# Patient Record
Sex: MALE | Race: WHITE | Employment: FULL TIME | URBAN - METROPOLITAN AREA
[De-identification: names, ages, dates, MRNs, and addresses within clinical notes are randomized per-mention and may not be internally consistent; named-entity substitution may affect disease eponyms.]

---

## 2017-02-07 NOTE — TELEPHONE ENCOUNTER
Rx request for Metformin HCL 1000 mg, PASSED Diabetes Protocol. Rx filled per protocol.     Diabetes Medications  Protocol Criteria:  · Appointment scheduled in the past 6 months or the next 3 months  · A1C < 7.5 in the past 6 months  · Creatinine in the pa

## 2017-02-23 ENCOUNTER — OFFICE VISIT (OUTPATIENT)
Dept: CARDIOLOGY CLINIC | Facility: CLINIC | Age: 57
End: 2017-02-23

## 2017-02-23 VITALS
DIASTOLIC BLOOD PRESSURE: 72 MMHG | HEART RATE: 96 BPM | BODY MASS INDEX: 30 KG/M2 | WEIGHT: 222 LBS | SYSTOLIC BLOOD PRESSURE: 118 MMHG

## 2017-02-23 DIAGNOSIS — E78.00 HYPERCHOLESTEREMIA: Primary | ICD-10-CM

## 2017-02-23 DIAGNOSIS — R94.31 ABNORMAL ECG: ICD-10-CM

## 2017-02-23 PROCEDURE — 99244 OFF/OP CNSLTJ NEW/EST MOD 40: CPT | Performed by: INTERNAL MEDICINE

## 2017-02-23 PROCEDURE — 99212 OFFICE O/P EST SF 10 MIN: CPT | Performed by: INTERNAL MEDICINE

## 2017-02-23 NOTE — H&P
Chuy Age is a 61year old male. HPI:   This is a pleasant  48-year-old male with family history of heart disease, diabetes hypertension tension and elevated cholesterol who presents for assessment of abnormal EKG.   We are asked by Dr. Amadeo Gao to asse stones       Social History:    Smoking Status: Never Smoker                      Smokeless Status: Never Used                        Alcohol Use: No                 REVIEW OF SYSTEMS:   GENERAL HEALTH: feels well otherwise  SKIN: denies any unusual skin l

## 2017-03-01 RX ORDER — LISINOPRIL 40 MG/1
TABLET ORAL
Qty: 30 TABLET | Refills: 11 | Status: SHIPPED | OUTPATIENT
Start: 2017-03-01 | End: 2018-02-01

## 2017-03-30 ENCOUNTER — TELEPHONE (OUTPATIENT)
Dept: CARDIOLOGY CLINIC | Facility: CLINIC | Age: 57
End: 2017-03-30

## 2017-03-30 NOTE — TELEPHONE ENCOUNTER
Pt states that insurance told him to call Dr. Sumi Ortiz to request referral for stress echo. Appt is not scheduled yet. Please call.

## 2017-03-31 NOTE — TELEPHONE ENCOUNTER
S/w [employer] [facility] and with the customer service number and they sates pt has an HMO. S/w our Managed care department, Rochelle, and she states pt does not need a referral for the stress echo as long as he has test done within the system.       Left detailed

## 2017-04-19 ENCOUNTER — TELEPHONE (OUTPATIENT)
Dept: FAMILY MEDICINE CLINIC | Facility: CLINIC | Age: 57
End: 2017-04-19

## 2017-04-19 NOTE — TELEPHONE ENCOUNTER
Spoke to Dalton from [facility], advised her of Dr. reed, ok to take all meds. She voices understanding and agrees with plan, she will call the pt.

## 2017-04-19 NOTE — TELEPHONE ENCOUNTER
Yelena New, pt's home health nurse, called in stating pt is going to have a stress test tomorrow and he is concerned about what medications he can and cannot take for tomorrow? Please advise.

## 2017-04-19 NOTE — TELEPHONE ENCOUNTER
Patient is to have stress test tomorrow and is asking if any of patients medications should not be taken tomorrow : current med list: sent to on-call as well as [facility] out of office     METFORMIN HCL 1000 MG Oral Tab  TAKE ONE TABLET BY MOUTH TWICE DAILY WITH

## 2017-04-19 NOTE — TELEPHONE ENCOUNTER
Dr. RIVERA/Dr. Abdelrahman Michaud, please advise on behalf of [facility], out of office.

## 2017-04-20 ENCOUNTER — HOSPITAL ENCOUNTER (OUTPATIENT)
Dept: CV DIAGNOSTICS | Facility: HOSPITAL | Age: 57
Discharge: HOME | End: 2017-04-20
Attending: INTERNAL MEDICINE
Payer: COMMERCIAL

## 2017-04-20 DIAGNOSIS — R94.31 ABNORMAL ECG: ICD-10-CM

## 2017-04-20 PROCEDURE — 93016 CV STRESS TEST SUPVJ ONLY: CPT | Performed by: INTERNAL MEDICINE

## 2017-04-20 PROCEDURE — 93350 STRESS TTE ONLY: CPT

## 2017-04-20 PROCEDURE — 93018 CV STRESS TEST I&R ONLY: CPT | Performed by: INTERNAL MEDICINE

## 2017-04-20 PROCEDURE — 93017 CV STRESS TEST TRACING ONLY: CPT

## 2017-04-20 PROCEDURE — 93350 STRESS TTE ONLY: CPT | Performed by: INTERNAL MEDICINE

## 2017-04-26 ENCOUNTER — TELEPHONE (OUTPATIENT)
Dept: CARDIOLOGY CLINIC | Facility: CLINIC | Age: 57
End: 2017-04-26

## 2017-04-26 DIAGNOSIS — R94.31 ABNORMAL EKG: Primary | ICD-10-CM

## 2017-04-26 NOTE — TELEPHONE ENCOUNTER
LMTCB    Notes Recorded by Kannan Dewitt RN on 4/25/2017 at 5:00 PM  LMTCB  ------    Notes Recorded by SOHAN Jackson on 4/25/2017 at 3:56 PM  Echo pictures were not adequate to assess LV so Dr. Dianna Carr recommends thallium nuclear stress te

## 2017-05-23 ENCOUNTER — OFFICE VISIT (OUTPATIENT)
Dept: FAMILY MEDICINE CLINIC | Facility: CLINIC | Age: 57
End: 2017-05-23

## 2017-05-23 ENCOUNTER — HOSPITAL ENCOUNTER (OUTPATIENT)
Dept: GENERAL RADIOLOGY | Age: 57
Discharge: HOME | End: 2017-05-23
Attending: FAMILY MEDICINE
Payer: COMMERCIAL

## 2017-05-23 VITALS
RESPIRATION RATE: 16 BRPM | TEMPERATURE: 98 F | SYSTOLIC BLOOD PRESSURE: 138 MMHG | DIASTOLIC BLOOD PRESSURE: 94 MMHG | WEIGHT: 221.63 LBS | BODY MASS INDEX: 30.02 KG/M2 | HEART RATE: 105 BPM | HEIGHT: 72 IN

## 2017-05-23 DIAGNOSIS — R05.9 COUGH: Primary | ICD-10-CM

## 2017-05-23 DIAGNOSIS — J40 BRONCHITIS: ICD-10-CM

## 2017-05-23 DIAGNOSIS — R09.89 RHONCHI: ICD-10-CM

## 2017-05-23 DIAGNOSIS — R05.9 COUGH: ICD-10-CM

## 2017-05-23 PROCEDURE — 99214 OFFICE O/P EST MOD 30 MIN: CPT | Performed by: FAMILY MEDICINE

## 2017-05-23 PROCEDURE — 99212 OFFICE O/P EST SF 10 MIN: CPT | Performed by: FAMILY MEDICINE

## 2017-05-23 PROCEDURE — 71020 XR CHEST PA + LAT CHEST (CPT=71020): CPT | Performed by: FAMILY MEDICINE

## 2017-05-23 RX ORDER — PROMETHAZINE HYDROCHLORIDE AND CODEINE PHOSPHATE 6.25; 1 MG/5ML; MG/5ML
5 SYRUP ORAL EVERY 6 HOURS PRN
Qty: 180 ML | Refills: 0 | Status: SHIPPED | OUTPATIENT
Start: 2017-05-23 | End: 2017-07-08 | Stop reason: ALTCHOICE

## 2017-05-23 RX ORDER — ALBUTEROL SULFATE 90 UG/1
2 AEROSOL, METERED RESPIRATORY (INHALATION) EVERY 6 HOURS PRN
Qty: 1 INHALER | Refills: 0 | Status: SHIPPED | OUTPATIENT
Start: 2017-05-23 | End: 2017-07-08 | Stop reason: ALTCHOICE

## 2017-05-23 RX ORDER — PREDNISONE 20 MG/1
TABLET ORAL
Qty: 10 TABLET | Refills: 0 | Status: SHIPPED | OUTPATIENT
Start: 2017-05-23 | End: 2017-07-08 | Stop reason: ALTCHOICE

## 2017-05-23 RX ORDER — SIMVASTATIN 10 MG
TABLET ORAL
Refills: 2 | COMMUNITY
Start: 2017-05-17 | End: 2017-06-13

## 2017-05-23 NOTE — PROGRESS NOTES
Patient ID: Rubio Simmons is a 61year old male. HPI  Patient presents with:  Cough  Sore Throat    He states his cough started on May 19, 2017. The sore throat is mostly at night. He does have a runny nose.   His mother is in the hospital with bronchi LANCETS Does not apply Misc Check sugars daily Disp: 100 each Rfl: 2   FINASTERIDE 5 MG Oral Tab TAKE ONE TABLET BY MOUTH EVERY DAY Disp: 90 tablet Rfl: 3   Ascorbic Acid (VITAMIN C OR) Take by mouth.  Disp:  Rfl:    Multiple Vitamins-Minerals (MULTIVITAMIN Wheezing or Shortness of Breath. -     promethazine-codeine 6.25-10 MG/5ML Oral Syrup; Take 5 mL by mouth every 6 (six) hours as needed for cough. -     predniSONE 20 MG Oral Tab; Take 2 by mouth at same time daily for 5 days. (Best to take in A. M.)  I w

## 2017-05-24 ENCOUNTER — TELEPHONE (OUTPATIENT)
Dept: FAMILY MEDICINE CLINIC | Facility: CLINIC | Age: 57
End: 2017-05-24

## 2017-05-24 NOTE — TELEPHONE ENCOUNTER
----- Message from Ronnie Mendoza DO sent at 5/23/2017  5:48 PM CDT -----  Chest x-ray shows no pneumonia.

## 2017-05-30 ENCOUNTER — TELEPHONE (OUTPATIENT)
Dept: FAMILY MEDICINE CLINIC | Facility: CLINIC | Age: 57
End: 2017-05-30

## 2017-05-30 RX ORDER — AMOXICILLIN AND CLAVULANATE POTASSIUM 875; 125 MG/1; MG/1
1 TABLET, FILM COATED ORAL 2 TIMES DAILY
Qty: 20 TABLET | Refills: 0 | Status: SHIPPED | OUTPATIENT
Start: 2017-05-30 | End: 2017-06-09

## 2017-05-30 NOTE — TELEPHONE ENCOUNTER
Actions Requested: Declining OV; asking if Dr Inés Gage would prescribed an antibiotic or something else that might clear the cough completely  Situation/Background   Problem: Still having congested cough   Onset: 12 days   Associated Symptoms:  There has been pete very sick or weak to the triager  * Coughed up > 1 tablespoon (15 ml) blood (Exception: blood-tinged sputum)  * Fever > 103 F (39.4 C)  * Fever > 100.5 F (38.1 C) and over 61years of age  * Fever > 100.5 F (38.1 C) and has diabetes mellitus or a weak immu

## 2017-05-30 NOTE — TELEPHONE ENCOUNTER
Pt's girlfriend called in stating pt has a very bad cough still and his lungs sound congested. Pt's girlfriend states pt is also coughing up yellow phlegm. Pt's girlfriend wondering if pt can be prescribed an antibiotic? Please advise.

## 2017-06-10 NOTE — TELEPHONE ENCOUNTER
Diabetes Medications: Refilled per protocol    Protocol Criteria:  · Appointment scheduled in the past 6 months or the next 3 months  · A1C < 7.5 in the past 6 months  · Creatinine in the past 12 months  · Creatinine result < 1.5   Recent Visits       Prov

## 2017-06-13 DIAGNOSIS — E11.9 DIABETES MELLITUS WITHOUT COMPLICATION (HCC): Primary | ICD-10-CM

## 2017-06-15 RX ORDER — SIMVASTATIN 10 MG
TABLET ORAL
Qty: 30 TABLET | Refills: 11 | Status: SHIPPED | OUTPATIENT
Start: 2017-06-15 | End: 2018-05-24

## 2017-06-15 NOTE — TELEPHONE ENCOUNTER
MADAY, please contact patient to scheduled an appointment for diabetic follow up. Orders are in the system.

## 2017-06-16 ENCOUNTER — TELEPHONE (OUTPATIENT)
Dept: FAMILY MEDICINE CLINIC | Facility: CLINIC | Age: 57
End: 2017-06-16

## 2017-06-16 NOTE — TELEPHONE ENCOUNTER
Pt calling states he has to do lab work orders at [facility], pt requesting to  orders at [facility]. Please call when approved.

## 2017-06-16 NOTE — TELEPHONE ENCOUNTER
LMTCB. Orders have been printed and are ready for  at [facility]. Pt can use [facility] lab orders and take them to [facility] per [facility]. Please inform pt when he calls back.

## 2017-07-05 RX ORDER — FINASTERIDE 5 MG/1
TABLET, FILM COATED ORAL
Qty: 30 TABLET | Refills: 11 | Status: SHIPPED | OUTPATIENT
Start: 2017-07-05 | End: 2018-06-12

## 2017-07-08 ENCOUNTER — OFFICE VISIT (OUTPATIENT)
Dept: FAMILY MEDICINE CLINIC | Facility: CLINIC | Age: 57
End: 2017-07-08

## 2017-07-08 VITALS
SYSTOLIC BLOOD PRESSURE: 121 MMHG | DIASTOLIC BLOOD PRESSURE: 79 MMHG | BODY MASS INDEX: 30 KG/M2 | WEIGHT: 221 LBS | HEART RATE: 68 BPM

## 2017-07-08 DIAGNOSIS — E78.00 HIGH CHOLESTEROL: ICD-10-CM

## 2017-07-08 DIAGNOSIS — I10 ESSENTIAL HYPERTENSION: ICD-10-CM

## 2017-07-08 DIAGNOSIS — I49.3 FREQUENT PVCS: ICD-10-CM

## 2017-07-08 DIAGNOSIS — Z87.442 HISTORY OF KIDNEY STONES: ICD-10-CM

## 2017-07-08 DIAGNOSIS — E11.9 DIABETES MELLITUS WITHOUT COMPLICATION (HCC): Primary | ICD-10-CM

## 2017-07-08 DIAGNOSIS — Z12.5 SCREENING FOR PROSTATE CANCER: ICD-10-CM

## 2017-07-08 PROCEDURE — 99214 OFFICE O/P EST MOD 30 MIN: CPT | Performed by: FAMILY MEDICINE

## 2017-07-08 PROCEDURE — 99212 OFFICE O/P EST SF 10 MIN: CPT | Performed by: FAMILY MEDICINE

## 2017-07-08 NOTE — PROGRESS NOTES
Dictated patient presents for follow-up on his diabetes. His diabetes is under good control. We reviewed his blood test.  Patient states that his breathing is much improved his bronchitis has resolved.   Patient is not using any medication for that issue PVCs  stable    4. High cholesterol  Recheck nov  currenlty under control    5. History of kidney stones  Stable. 6. Screening for prostate cancer  Stable.     - PSA SCREEN; Future    Majority of time was spent in discussion with 25 minutes spent discuss

## 2017-09-21 RX ORDER — BLOOD-GLUCOSE METER
KIT MISCELLANEOUS
Qty: 50 STRIP | Refills: 2 | Status: SHIPPED | OUTPATIENT
Start: 2017-09-21 | End: 2018-05-10

## 2017-09-21 RX ORDER — LANCETS 28 GAUGE
EACH MISCELLANEOUS
Qty: 100 EACH | Refills: 2 | Status: SHIPPED | OUTPATIENT
Start: 2017-09-21 | End: 2018-08-23

## 2017-09-21 NOTE — TELEPHONE ENCOUNTER
Refill Protocol Appointment Criteria: Refilled per protocol    · Appointment scheduled in the past 12 months or in the next 3 months  Recent Outpatient Visits            2 months ago Diabetes mellitus without complication [facility])    [facility], [place]

## 2017-10-06 NOTE — TELEPHONE ENCOUNTER
Signed Prescriptions Disp Refills    METFORMIN HCL 1000 MG Oral Tab 180 tablet 0      Sig: TAKE ONE TABLET BY MOUTH TWICE DAILY WITH MEALS        Authorizing Provider: Janki Moran        Ordering User: Anthony Perez         Chart was revie

## 2018-01-17 ENCOUNTER — APPOINTMENT (OUTPATIENT)
Dept: LAB | Age: 58
End: 2018-01-17
Attending: FAMILY MEDICINE
Payer: COMMERCIAL

## 2018-01-17 DIAGNOSIS — E11.9 DIABETES MELLITUS WITHOUT COMPLICATION (HCC): ICD-10-CM

## 2018-01-17 DIAGNOSIS — Z12.5 SCREENING FOR PROSTATE CANCER: ICD-10-CM

## 2018-01-17 LAB
ALBUMIN SERPL BCP-MCNC: 3.8 G/DL (ref 3.5–4.8)
ALBUMIN/GLOB SERPL: 1.4 {RATIO} (ref 1–2)
ALP SERPL-CCNC: 48 U/L (ref 32–100)
ALT SERPL-CCNC: 34 U/L (ref 17–63)
ANION GAP SERPL CALC-SCNC: 8 MMOL/L (ref 0–18)
AST SERPL-CCNC: 25 U/L (ref 15–41)
BILIRUB SERPL-MCNC: 0.4 MG/DL (ref 0.3–1.2)
BUN SERPL-MCNC: 13 MG/DL (ref 8–20)
BUN/CREAT SERPL: 13.7 (ref 10–20)
CALCIUM SERPL-MCNC: 9.5 MG/DL (ref 8.5–10.5)
CHLORIDE SERPL-SCNC: 106 MMOL/L (ref 95–110)
CHOLEST SERPL-MCNC: 147 MG/DL (ref 110–200)
CO2 SERPL-SCNC: 24 MMOL/L (ref 22–32)
CREAT SERPL-MCNC: 0.95 MG/DL (ref 0.5–1.5)
CREAT UR-MCNC: 163.3 MG/DL
GLOBULIN PLAS-MCNC: 2.7 G/DL (ref 2.5–3.7)
GLUCOSE SERPL-MCNC: 119 MG/DL (ref 70–99)
HBA1C MFR BLD: 6.6 % (ref 4–6)
HDLC SERPL-MCNC: 49 MG/DL
LDLC SERPL CALC-MCNC: 66 MG/DL (ref 0–99)
MICROALBUMIN UR-MCNC: 1.4 MG/DL (ref 0–1.8)
MICROALBUMIN/CREAT UR: 8.6 MG/G{CREAT} (ref 0–20)
NONHDLC SERPL-MCNC: 98 MG/DL
OSMOLALITY UR CALC.SUM OF ELEC: 287 MOSM/KG (ref 275–295)
POTASSIUM SERPL-SCNC: 4.5 MMOL/L (ref 3.3–5.1)
PROT SERPL-MCNC: 6.5 G/DL (ref 5.9–8.4)
PSA SERPL-MCNC: 0.8 NG/ML (ref 0–4)
SODIUM SERPL-SCNC: 138 MMOL/L (ref 136–144)
TRIGL SERPL-MCNC: 162 MG/DL (ref 1–149)

## 2018-01-17 PROCEDURE — 83036 HEMOGLOBIN GLYCOSYLATED A1C: CPT

## 2018-01-17 PROCEDURE — 80053 COMPREHEN METABOLIC PANEL: CPT

## 2018-01-17 PROCEDURE — 80061 LIPID PANEL: CPT

## 2018-01-17 PROCEDURE — 82043 UR ALBUMIN QUANTITATIVE: CPT

## 2018-01-17 PROCEDURE — 36415 COLL VENOUS BLD VENIPUNCTURE: CPT

## 2018-01-17 PROCEDURE — 82570 ASSAY OF URINE CREATININE: CPT

## 2018-01-17 PROCEDURE — 84153 ASSAY OF PSA TOTAL: CPT

## 2018-02-01 ENCOUNTER — OFFICE VISIT (OUTPATIENT)
Dept: FAMILY MEDICINE CLINIC | Facility: CLINIC | Age: 58
End: 2018-02-01

## 2018-02-01 VITALS
DIASTOLIC BLOOD PRESSURE: 85 MMHG | SYSTOLIC BLOOD PRESSURE: 128 MMHG | WEIGHT: 218 LBS | HEART RATE: 74 BPM | BODY MASS INDEX: 30 KG/M2 | TEMPERATURE: 98 F

## 2018-02-01 DIAGNOSIS — E78.00 HIGH CHOLESTEROL: ICD-10-CM

## 2018-02-01 DIAGNOSIS — I10 ESSENTIAL HYPERTENSION: ICD-10-CM

## 2018-02-01 DIAGNOSIS — Z87.442 HISTORY OF KIDNEY STONES: ICD-10-CM

## 2018-02-01 DIAGNOSIS — Z23 NEED FOR VACCINATION: ICD-10-CM

## 2018-02-01 DIAGNOSIS — Z00.00 ANNUAL PHYSICAL EXAM: Primary | ICD-10-CM

## 2018-02-01 DIAGNOSIS — E11.9 DIABETES MELLITUS WITHOUT COMPLICATION (HCC): ICD-10-CM

## 2018-02-01 PROCEDURE — 90472 IMMUNIZATION ADMIN EACH ADD: CPT | Performed by: FAMILY MEDICINE

## 2018-02-01 PROCEDURE — 90715 TDAP VACCINE 7 YRS/> IM: CPT | Performed by: FAMILY MEDICINE

## 2018-02-01 PROCEDURE — 90471 IMMUNIZATION ADMIN: CPT | Performed by: FAMILY MEDICINE

## 2018-02-01 PROCEDURE — 90670 PCV13 VACCINE IM: CPT | Performed by: FAMILY MEDICINE

## 2018-02-01 PROCEDURE — 99396 PREV VISIT EST AGE 40-64: CPT | Performed by: FAMILY MEDICINE

## 2018-02-01 RX ORDER — LISINOPRIL 40 MG/1
40 TABLET ORAL
Qty: 90 TABLET | Refills: 3 | Status: SHIPPED | OUTPATIENT
Start: 2018-02-01 | End: 2019-02-09

## 2018-02-01 NOTE — PROGRESS NOTES
REASON FOR VISIT:    Vidya Duran is a 61year old male who presents for an [address]. \"my sugars have been good. \"    Patient Active Problem List:     Hypercholesteremia     Abnormal ECG      Current Outpatient Prescriptions:  lisinopri AST 25 01/17/2018   AST 20 06/29/2017   AST 18 11/10/2016       Lab Results  Component Value Date   ALT 34 01/17/2018   ALT 32 06/29/2017   ALT 24 11/10/2016     No results found for: TSH, T4F    Lab Results  Component Value Date   BUN 13 01/17/2018   BU 01/17/2018 119 (H)     GLUCOSE (mg/dL)   Date Value   06/29/2017 132 (H)         Gonorrhea Screening if high risk No results found for: GONOCOCCUS    HIV Screening For all adults age 22-65, older adults at increased risk No results found for: HIV    Syph Current Outpatient Prescriptions:  lisinopril 40 MG Oral Tab Take 1 tablet (40 mg total) by mouth once daily.  Disp: 90 tablet Rfl: 3   METFORMIN HCL 1000 MG Oral Tab TAKE ONE TABLET BY MOUTH TWICE DAILY WITH MEALS Disp: 180 tablet Rfl: 0   FREESTYLE LA nasal congestion, sinus pain or ST  LUNGS: denies shortness of breath with exertion  CARDIOVASCULAR: denies chest pain on exertion  GI: denies abdominal pain, denies heartburn  : denies dysuria, vaginal discharge or itching, periods regular   MUSCULOSKEL patient indicates understanding of these issues and agrees to the plan. The patient is asked to return in 6 mo for diabetes.   Diet counseling perfomed    SUGGESTED VACCINATIONS - Influenza, Pneumococcal, Zoster, Tetanus     Immunization History   Administ

## 2018-02-03 NOTE — TELEPHONE ENCOUNTER
Chart reviewed. Refills sent per Triage Dept protocol. Diabetes Medications, RTC in 6 months from [address] [place] [address].   Protocol Criteria:  · Appointment scheduled in the past 6 months or the next 3 months  · A1C < 7.5 in the past 6 months  · Creatinine in the past 12 m

## 2018-05-14 RX ORDER — BLOOD-GLUCOSE METER
KIT MISCELLANEOUS
Qty: 100 STRIP | Refills: 2 | Status: SHIPPED | OUTPATIENT
Start: 2018-05-14 | End: 2018-08-23

## 2018-05-24 RX ORDER — SIMVASTATIN 10 MG
TABLET ORAL
Qty: 90 TABLET | Refills: 0 | Status: SHIPPED | OUTPATIENT
Start: 2018-05-24 | End: 2018-08-23

## 2018-06-12 RX ORDER — FINASTERIDE 5 MG/1
TABLET, FILM COATED ORAL
Qty: 30 TABLET | Refills: 10 | Status: SHIPPED | OUTPATIENT
Start: 2018-06-12 | End: 2019-05-16

## 2018-08-14 ENCOUNTER — APPOINTMENT (OUTPATIENT)
Dept: LAB | Age: 58
End: 2018-08-14
Attending: FAMILY MEDICINE
Payer: COMMERCIAL

## 2018-08-14 DIAGNOSIS — E11.9 DIABETES MELLITUS WITHOUT COMPLICATION (HCC): ICD-10-CM

## 2018-08-14 LAB
ALBUMIN SERPL BCP-MCNC: 4 G/DL (ref 3.5–4.8)
ALBUMIN/GLOB SERPL: 1.7 {RATIO} (ref 1–2)
ALP SERPL-CCNC: 48 U/L (ref 32–100)
ALT SERPL-CCNC: 34 U/L (ref 17–63)
ANION GAP SERPL CALC-SCNC: 8 MMOL/L (ref 0–18)
AST SERPL-CCNC: 28 U/L (ref 15–41)
BILIRUB SERPL-MCNC: 0.9 MG/DL (ref 0.3–1.2)
BUN SERPL-MCNC: 17 MG/DL (ref 8–20)
BUN/CREAT SERPL: 17 (ref 10–20)
CALCIUM SERPL-MCNC: 9.5 MG/DL (ref 8.5–10.5)
CHLORIDE SERPL-SCNC: 105 MMOL/L (ref 95–110)
CHOLEST SERPL-MCNC: 151 MG/DL (ref 110–200)
CO2 SERPL-SCNC: 25 MMOL/L (ref 22–32)
CREAT SERPL-MCNC: 1 MG/DL (ref 0.5–1.5)
CREAT UR-MCNC: 228.2 MG/DL
GLOBULIN PLAS-MCNC: 2.3 G/DL (ref 2.5–3.7)
GLUCOSE SERPL-MCNC: 124 MG/DL (ref 70–99)
HDLC SERPL-MCNC: 53 MG/DL
LDLC SERPL CALC-MCNC: 82 MG/DL (ref 0–99)
MICROALBUMIN UR-MCNC: 0.8 MG/DL (ref 0–1.8)
MICROALBUMIN/CREAT UR: 3.5 MG/G{CREAT} (ref 0–20)
NONHDLC SERPL-MCNC: 98 MG/DL
OSMOLALITY UR CALC.SUM OF ELEC: 289 MOSM/KG (ref 275–295)
PATIENT FASTING: YES
POTASSIUM SERPL-SCNC: 4.7 MMOL/L (ref 3.3–5.1)
PROT SERPL-MCNC: 6.3 G/DL (ref 5.9–8.4)
SODIUM SERPL-SCNC: 138 MMOL/L (ref 136–144)
TRIGL SERPL-MCNC: 80 MG/DL (ref 1–149)

## 2018-08-14 PROCEDURE — 36415 COLL VENOUS BLD VENIPUNCTURE: CPT

## 2018-08-14 PROCEDURE — 83036 HEMOGLOBIN GLYCOSYLATED A1C: CPT

## 2018-08-14 PROCEDURE — 80061 LIPID PANEL: CPT

## 2018-08-14 PROCEDURE — 82043 UR ALBUMIN QUANTITATIVE: CPT

## 2018-08-14 PROCEDURE — 80053 COMPREHEN METABOLIC PANEL: CPT

## 2018-08-14 PROCEDURE — 82570 ASSAY OF URINE CREATININE: CPT

## 2018-08-15 LAB — HBA1C MFR BLD: 6.5 % (ref 4–6)

## 2018-08-22 ENCOUNTER — TELEPHONE (OUTPATIENT)
Dept: CASE MANAGEMENT | Age: 58
End: 2018-08-22

## 2018-08-22 DIAGNOSIS — Z12.11 COLON CANCER SCREENING: Primary | ICD-10-CM

## 2018-08-23 ENCOUNTER — OFFICE VISIT (OUTPATIENT)
Dept: FAMILY MEDICINE CLINIC | Facility: CLINIC | Age: 58
End: 2018-08-23

## 2018-08-23 VITALS
HEART RATE: 71 BPM | TEMPERATURE: 98 F | DIASTOLIC BLOOD PRESSURE: 83 MMHG | RESPIRATION RATE: 18 BRPM | SYSTOLIC BLOOD PRESSURE: 130 MMHG | HEIGHT: 72 IN | BODY MASS INDEX: 29.12 KG/M2 | WEIGHT: 215 LBS

## 2018-08-23 DIAGNOSIS — Z12.5 SCREENING FOR PROSTATE CANCER: ICD-10-CM

## 2018-08-23 DIAGNOSIS — E78.00 HIGH CHOLESTEROL: ICD-10-CM

## 2018-08-23 DIAGNOSIS — E11.9 TYPE 2 DIABETES MELLITUS WITHOUT COMPLICATION, WITHOUT LONG-TERM CURRENT USE OF INSULIN (HCC): Primary | ICD-10-CM

## 2018-08-23 DIAGNOSIS — K42.9 UMBILICAL HERNIA WITHOUT OBSTRUCTION AND WITHOUT GANGRENE: ICD-10-CM

## 2018-08-23 DIAGNOSIS — I10 ESSENTIAL HYPERTENSION: ICD-10-CM

## 2018-08-23 PROCEDURE — 99214 OFFICE O/P EST MOD 30 MIN: CPT | Performed by: FAMILY MEDICINE

## 2018-08-23 PROCEDURE — 99212 OFFICE O/P EST SF 10 MIN: CPT | Performed by: FAMILY MEDICINE

## 2018-08-23 RX ORDER — SIMVASTATIN 10 MG
10 TABLET ORAL
Qty: 90 TABLET | Refills: 3 | Status: SHIPPED | OUTPATIENT
Start: 2018-08-23 | End: 2019-04-22

## 2018-08-23 NOTE — PROGRESS NOTES
Diabetic Visit  Sugars usually under 120 and above 110. \"Even when I eat portillos cake. \"    Patient denies problems with their medication. Denies ulcers, chest pain, dyspnea on exertion.     Ht rrr no M no S3 S4  Lung clear no wheeze  abd soft nontende

## 2018-08-24 ENCOUNTER — TELEPHONE (OUTPATIENT)
Dept: OTHER | Age: 58
End: 2018-08-24

## 2018-08-24 NOTE — TELEPHONE ENCOUNTER
LMTCB    Blood sugar cholesterol liver kidney function were all fine.  Continue on the current medication regimen.  Repeat blood tests 6 months.  RN please put an order for diabetic panel diagnosis diabetes mellitus type 2 non-insulin-requiring no complica

## 2018-08-27 NOTE — TELEPHONE ENCOUNTER
Pt stated that he is aware of the test results. He stated that he was in on 8/23/2018 and his test results were discuss . Thanks

## 2018-08-27 NOTE — TELEPHONE ENCOUNTER
Patient returned our call and states that he had colonoscopy at [facility] in 2009. No record of it in [place] nor [facility]. Gastro office contacted and they have no record of it.

## 2018-08-28 NOTE — TELEPHONE ENCOUNTER
Patient returned our call and states that he is certain that he had the colonoscopy in 2009 @ [place].   No record found. Patient states that he will schedule on for 2019.

## 2018-09-11 RX ORDER — BLOOD-GLUCOSE METER
KIT MISCELLANEOUS
Qty: 1 KIT | Refills: 0 | Status: SHIPPED | OUTPATIENT
Start: 2018-09-11 | End: 2022-12-06

## 2018-09-11 RX ORDER — LANCETS 28 GAUGE
EACH MISCELLANEOUS
Qty: 100 EACH | Refills: 3 | Status: SHIPPED | OUTPATIENT
Start: 2018-09-11 | End: 2019-12-14

## 2018-09-11 RX ORDER — BLOOD-GLUCOSE CONTROL, NORMAL
EACH MISCELLANEOUS
Qty: 3 EACH | Refills: 3 | Status: SHIPPED | OUTPATIENT
Start: 2018-09-11 | End: 2022-12-06

## 2018-09-11 RX ORDER — BLOOD-GLUCOSE METER
KIT MISCELLANEOUS
Qty: 100 STRIP | Refills: 3 | Status: SHIPPED | OUTPATIENT
Start: 2018-09-11 | End: 2019-10-16

## 2018-09-24 ENCOUNTER — TELEPHONE (OUTPATIENT)
Dept: CASE MANAGEMENT | Age: 58
End: 2018-09-24

## 2018-10-19 ENCOUNTER — TELEPHONE (OUTPATIENT)
Dept: CASE MANAGEMENT | Age: 58
End: 2018-10-19

## 2018-11-20 ENCOUNTER — TELEPHONE (OUTPATIENT)
Dept: CASE MANAGEMENT | Age: 58
End: 2018-11-20

## 2019-01-10 ENCOUNTER — TELEPHONE (OUTPATIENT)
Dept: FAMILY MEDICINE CLINIC | Facility: CLINIC | Age: 59
End: 2019-01-10

## 2019-01-10 ENCOUNTER — OFFICE VISIT (OUTPATIENT)
Dept: FAMILY MEDICINE CLINIC | Facility: CLINIC | Age: 59
End: 2019-01-10

## 2019-01-10 ENCOUNTER — NURSE TRIAGE (OUTPATIENT)
Dept: OTHER | Age: 59
End: 2019-01-10

## 2019-01-10 VITALS
TEMPERATURE: 97 F | HEART RATE: 73 BPM | WEIGHT: 212 LBS | BODY MASS INDEX: 29 KG/M2 | SYSTOLIC BLOOD PRESSURE: 137 MMHG | DIASTOLIC BLOOD PRESSURE: 79 MMHG

## 2019-01-10 DIAGNOSIS — J11.1 INFLUENZA: ICD-10-CM

## 2019-01-10 DIAGNOSIS — Z12.5 SCREENING FOR PROSTATE CANCER: Primary | ICD-10-CM

## 2019-01-10 DIAGNOSIS — E11.9 TYPE 2 DIABETES MELLITUS WITHOUT COMPLICATION, WITHOUT LONG-TERM CURRENT USE OF INSULIN (HCC): Primary | ICD-10-CM

## 2019-01-10 LAB
CONTROL LINE PRESENT WITH A CLEAR BACKGROUND (YES/NO): YES YES/NO
KIT LOT #: NORMAL NUMERIC

## 2019-01-10 PROCEDURE — 99213 OFFICE O/P EST LOW 20 MIN: CPT | Performed by: FAMILY MEDICINE

## 2019-01-10 PROCEDURE — 99212 OFFICE O/P EST SF 10 MIN: CPT | Performed by: FAMILY MEDICINE

## 2019-01-10 PROCEDURE — 87880 STREP A ASSAY W/OPTIC: CPT | Performed by: FAMILY MEDICINE

## 2019-01-10 RX ORDER — PROMETHAZINE HYDROCHLORIDE AND CODEINE PHOSPHATE 6.25; 1 MG/5ML; MG/5ML
2.5 SYRUP ORAL EVERY 6 HOURS PRN
Qty: 180 ML | Refills: 0 | Status: SHIPPED | OUTPATIENT
Start: 2019-01-10 | End: 2019-10-10

## 2019-01-10 RX ORDER — ALBUTEROL SULFATE 90 UG/1
2 AEROSOL, METERED RESPIRATORY (INHALATION) EVERY 4 HOURS PRN
Qty: 1 INHALER | Refills: 3 | Status: SHIPPED | OUTPATIENT
Start: 2019-01-10 | End: 2019-10-17 | Stop reason: ALTCHOICE

## 2019-01-10 NOTE — TELEPHONE ENCOUNTER
appt made for sore throat, cough,cold,fever x 5 days-appt made with Dr Julianna Teixeira-    Reason for Disposition  • Patient wants to be seen    Protocols used: Uriah Rocha

## 2019-01-10 NOTE — TELEPHONE ENCOUNTER
PSA test was ordered.   He is to be 24 hours without symptoms prior to going back to the nursing home

## 2019-01-10 NOTE — PROGRESS NOTES
Pt complains of cough congestion   \"I have a really bad sore throat. \"  Had it for 4 days  No fever   No sob   No chest pain   No neck stiffness  No Headaches    Well-hydrated no shortness of breath no apparent distress but congested   Normocephalic atrau

## 2019-01-10 NOTE — TELEPHONE ENCOUNTER
Patient calling in, had OV today 1/10/19 with Dr [facility], wanted to ask how long would his symptoms be contagious, visits mother in a nursing home.  Please Advise    Patient also reported wanted to have a PSA done with his labs ordered today, current order in s

## 2019-02-09 RX ORDER — LISINOPRIL 40 MG/1
TABLET ORAL
Qty: 90 TABLET | Refills: 0 | Status: SHIPPED | OUTPATIENT
Start: 2019-02-09 | End: 2019-05-14

## 2019-02-27 ENCOUNTER — APPOINTMENT (OUTPATIENT)
Dept: LAB | Age: 59
End: 2019-02-27
Attending: FAMILY MEDICINE
Payer: COMMERCIAL

## 2019-02-27 DIAGNOSIS — Z12.5 SCREENING FOR PROSTATE CANCER: ICD-10-CM

## 2019-02-27 DIAGNOSIS — E11.9 TYPE 2 DIABETES MELLITUS WITHOUT COMPLICATION, WITHOUT LONG-TERM CURRENT USE OF INSULIN (HCC): ICD-10-CM

## 2019-02-27 LAB
ALBUMIN SERPL-MCNC: 3.7 G/DL (ref 3.4–5)
ALBUMIN/GLOB SERPL: 1.1 {RATIO} (ref 1–2)
ALP LIVER SERPL-CCNC: 60 U/L (ref 45–117)
ALT SERPL-CCNC: 35 U/L (ref 16–61)
ANION GAP SERPL CALC-SCNC: 5 MMOL/L (ref 0–18)
AST SERPL-CCNC: 18 U/L (ref 15–37)
BILIRUB SERPL-MCNC: 0.3 MG/DL (ref 0.1–2)
BUN BLD-MCNC: 19 MG/DL (ref 7–18)
BUN/CREAT SERPL: 17.9 (ref 10–20)
CALCIUM BLD-MCNC: 9.4 MG/DL (ref 8.5–10.1)
CHLORIDE SERPL-SCNC: 110 MMOL/L (ref 98–107)
CHOLEST SMN-MCNC: 155 MG/DL (ref ?–200)
CO2 SERPL-SCNC: 27 MMOL/L (ref 21–32)
COMPLEXED PSA SERPL-MCNC: 0.96 NG/ML (ref ?–4)
CREAT BLD-MCNC: 1.06 MG/DL (ref 0.7–1.3)
CREAT UR-SCNC: 165 MG/DL
EST. AVERAGE GLUCOSE BLD GHB EST-MCNC: 140 MG/DL (ref 68–126)
GLOBULIN PLAS-MCNC: 3.3 G/DL (ref 2.8–4.4)
GLUCOSE BLD-MCNC: 129 MG/DL (ref 70–99)
HBA1C MFR BLD HPLC: 6.5 % (ref ?–5.7)
HDLC SERPL-MCNC: 54 MG/DL (ref 40–59)
LDLC SERPL CALC-MCNC: 77 MG/DL (ref ?–100)
M PROTEIN MFR SERPL ELPH: 7 G/DL (ref 6.4–8.2)
MICROALBUMIN UR-MCNC: 0.97 MG/DL
MICROALBUMIN/CREAT 24H UR-RTO: 5.9 UG/MG (ref ?–30)
NONHDLC SERPL-MCNC: 101 MG/DL (ref ?–130)
OSMOLALITY SERPL CALC.SUM OF ELEC: 298 MOSM/KG (ref 275–295)
POTASSIUM SERPL-SCNC: 4.7 MMOL/L (ref 3.5–5.1)
SODIUM SERPL-SCNC: 142 MMOL/L (ref 136–145)
TRIGL SERPL-MCNC: 120 MG/DL (ref 30–149)
VLDLC SERPL CALC-MCNC: 24 MG/DL (ref 0–30)

## 2019-02-27 PROCEDURE — 36415 COLL VENOUS BLD VENIPUNCTURE: CPT

## 2019-02-27 PROCEDURE — 82570 ASSAY OF URINE CREATININE: CPT

## 2019-02-27 PROCEDURE — 80061 LIPID PANEL: CPT

## 2019-02-27 PROCEDURE — 82043 UR ALBUMIN QUANTITATIVE: CPT

## 2019-02-27 PROCEDURE — 83036 HEMOGLOBIN GLYCOSYLATED A1C: CPT

## 2019-02-27 PROCEDURE — 80053 COMPREHEN METABOLIC PANEL: CPT

## 2019-03-30 ENCOUNTER — OFFICE VISIT (OUTPATIENT)
Dept: FAMILY MEDICINE CLINIC | Facility: CLINIC | Age: 59
End: 2019-03-30

## 2019-03-30 VITALS
SYSTOLIC BLOOD PRESSURE: 128 MMHG | WEIGHT: 212 LBS | TEMPERATURE: 98 F | HEIGHT: 72 IN | DIASTOLIC BLOOD PRESSURE: 82 MMHG | HEART RATE: 78 BPM | RESPIRATION RATE: 16 BRPM | BODY MASS INDEX: 28.71 KG/M2

## 2019-03-30 DIAGNOSIS — I25.10 ATHEROSCLEROSIS OF NATIVE CORONARY ARTERY OF NATIVE HEART WITHOUT ANGINA PECTORIS: ICD-10-CM

## 2019-03-30 DIAGNOSIS — E78.00 HYPERCHOLESTEREMIA: ICD-10-CM

## 2019-03-30 DIAGNOSIS — K42.9 UMBILICAL HERNIA WITHOUT OBSTRUCTION AND WITHOUT GANGRENE: ICD-10-CM

## 2019-03-30 DIAGNOSIS — I10 ESSENTIAL HYPERTENSION: ICD-10-CM

## 2019-03-30 DIAGNOSIS — Z87.442 HISTORY OF KIDNEY STONES: ICD-10-CM

## 2019-03-30 DIAGNOSIS — E66.3 PATIENT OVERWEIGHT: ICD-10-CM

## 2019-03-30 DIAGNOSIS — E11.59 TYPE 2 DIABETES MELLITUS WITH OTHER CIRCULATORY COMPLICATION, WITHOUT LONG-TERM CURRENT USE OF INSULIN (HCC): ICD-10-CM

## 2019-03-30 DIAGNOSIS — Z00.00 ANNUAL PHYSICAL EXAM: Primary | ICD-10-CM

## 2019-03-30 DIAGNOSIS — Z12.11 COLON CANCER SCREENING: ICD-10-CM

## 2019-03-30 PROCEDURE — 99396 PREV VISIT EST AGE 40-64: CPT | Performed by: FAMILY MEDICINE

## 2019-03-30 PROCEDURE — 90471 IMMUNIZATION ADMIN: CPT | Performed by: FAMILY MEDICINE

## 2019-03-30 PROCEDURE — 90732 PPSV23 VACC 2 YRS+ SUBQ/IM: CPT | Performed by: FAMILY MEDICINE

## 2019-03-30 NOTE — PROGRESS NOTES
REASON FOR VISIT:    Angela Armstrong is a 60year old male who presents for an [address].         Patient Active Problem List:     Hypercholesteremia     Abnormal ECG    General Health           CAGE:           Depression Screening (PHQ-2/PHQ-9) 02/27/2019 4.7     POTASSIUM (mmol/L)   Date Value   06/29/2017 4.8       Creatinine  Annually CREATININE (mg/dL)   Date Value   06/29/2017 1.06     Creatinine (mg/dL)   Date Value   02/27/2019 1.06       Digoxin Serum Conc  Annually No results found for mouth. Disp:  Rfl:    Multiple Vitamins-Minerals (MULTIVITAMIN MEN OR) Take by mouth. Disp:  Rfl:    aspirin 81 MG Oral Tab Take 1 tablet by mouth daily. Disp: 100 tablet Rfl: 3   Coenzyme Q10 (CO Q 10 OR) Take 1 tablet by mouth daily.  Disp:  Rfl:    prome Ht 6' (1.829 m)   Wt 212 lb (96.2 kg)   BMI 28.75 kg/m²   > BP Readings from Last 3 Encounters:  03/30/19 : 128/82  01/10/19 : 137/79  08/23/18 : 130/83      GENERAL: well developed, well nourished, in no apparent distress  SKIN: no rashes, no suspicious Colon cancer screening  Order /referral given. - EVALUATE & TREAT, GASTRO (INTERNAL)  2   The patient indicates understanding of these issues and agrees to the plan.   The patient is asked to return in 6 months for medication check  Diet counseling perfome

## 2019-04-18 ENCOUNTER — HOSPITAL ENCOUNTER (OUTPATIENT)
Dept: CT IMAGING | Age: 59
Discharge: HOME | End: 2019-04-18
Attending: FAMILY MEDICINE

## 2019-04-18 DIAGNOSIS — E11.59 TYPE 2 DIABETES MELLITUS WITH OTHER CIRCULATORY COMPLICATION, WITHOUT LONG-TERM CURRENT USE OF INSULIN (HCC): ICD-10-CM

## 2019-04-18 DIAGNOSIS — Z00.00 ANNUAL PHYSICAL EXAM: ICD-10-CM

## 2019-04-18 DIAGNOSIS — I25.10 ATHEROSCLEROSIS OF NATIVE CORONARY ARTERY OF NATIVE HEART WITHOUT ANGINA PECTORIS: ICD-10-CM

## 2019-04-18 NOTE — PROGRESS NOTES
Pt seen at [facility], [facility]:  PRELIMINARY LHZUW=286    TS=245/80 (still needs to take AM BP meds)    Cholestec labs as follows:  AA=818  HDL=53  LDL=74  GF=274  GLUCOSE=141; fasting    All results and risk factors discussed with patient; all arlet

## 2019-04-22 ENCOUNTER — TELEPHONE (OUTPATIENT)
Dept: OTHER | Age: 59
End: 2019-04-22

## 2019-04-22 DIAGNOSIS — R93.1 ABNORMAL HEART SCORE CT: Primary | ICD-10-CM

## 2019-04-22 RX ORDER — ROSUVASTATIN CALCIUM 40 MG/1
40 TABLET, COATED ORAL NIGHTLY
Qty: 90 TABLET | Refills: 3 | Status: SHIPPED | OUTPATIENT
Start: 2019-04-22 | End: 2020-04-07

## 2019-04-22 NOTE — TELEPHONE ENCOUNTER
LMTCB please transfer to triage.  Thanks    Notes recorded by Juan J Okeefe DO on 4/19/2019 at 1:53 PM CDT  The CT calcium score demonstrated moderately significant cholesterol around the heart.  I would have the patient follow-up with dr Shawn Hubbard jacob

## 2019-04-23 NOTE — TELEPHONE ENCOUNTER
Advised patient of Dr. María Tillman note. Patient verbalized understanding and will call cardiology.

## 2019-04-23 NOTE — TELEPHONE ENCOUNTER
Please clarify , you have sent this to [facility] . Dr. Seven Logan did see pt in 2017. But Dr. Mejia Maurer is the MD named below for pt to see.

## 2019-04-23 NOTE — TELEPHONE ENCOUNTER
LMTCB please transfer to triage. Thanks  Pt emergency contact was also called. No answer  I will send a no response letter to address on file. Please inform pt of Dr. [facility] message below.

## 2019-04-26 NOTE — TELEPHONE ENCOUNTER
LMTCB noted message below was only send to me. Just wanted to f/u with pt. Has he made a appt with the cardiologist?and when is it.

## 2019-04-29 NOTE — TELEPHONE ENCOUNTER
Dr Lizette Claire, patient saw Dr Sumi Ortiz 2/23/17, had abn EKG, did echo stress test that was inconclusive, Rachelle ordered thalium stress test which has not been done. I don't see a report from any other location scanned into the chart.      Do you want patien

## 2019-04-30 NOTE — TELEPHONE ENCOUNTER
Dr. Larissa Ballesteros cardiology   [address] [facility]   [phone]

## 2019-05-01 NOTE — TELEPHONE ENCOUNTER
Pt called and states he is working and not able to return the previous calls. He did not receive the letter. Informed him of name, address and phone #  of  Interventional cardiologist : Dr Makayla Johnson . Pt will call and set appt.

## 2019-05-14 RX ORDER — LISINOPRIL 40 MG/1
TABLET ORAL
Qty: 90 TABLET | Refills: 0 | Status: SHIPPED | OUTPATIENT
Start: 2019-05-14 | End: 2019-08-12

## 2019-05-15 RX ORDER — LISINOPRIL 40 MG/1
TABLET ORAL
Qty: 90 TABLET | Refills: 0 | OUTPATIENT
Start: 2019-05-15

## 2019-05-16 RX ORDER — FINASTERIDE 5 MG/1
TABLET, FILM COATED ORAL
Qty: 30 TABLET | Refills: 9 | Status: SHIPPED | OUTPATIENT
Start: 2019-05-16 | End: 2020-03-26

## 2019-06-03 NOTE — TELEPHONE ENCOUNTER
Refill passed per [facility], [facility] protocol.   Diabetes Medications  Protocol Criteria:  · Appointment scheduled in the past 6 months or the next 3 months  · A1C < 7.5 in the past 6 months  · Creatinine in the past 12 months  · Creatinine result < 1.5   Rece

## 2019-08-08 ENCOUNTER — LAB ENCOUNTER (OUTPATIENT)
Dept: LAB | Age: 59
End: 2019-08-08
Attending: INTERNAL MEDICINE
Payer: COMMERCIAL

## 2019-08-08 DIAGNOSIS — I25.10 CORONARY ATHEROSCLEROSIS OF NATIVE CORONARY ARTERY: Primary | ICD-10-CM

## 2019-08-08 LAB
CHOLEST SMN-MCNC: 109 MG/DL (ref ?–200)
HDLC SERPL-MCNC: 62 MG/DL (ref 40–59)
LDLC SERPL CALC-MCNC: 31 MG/DL (ref ?–100)
NONHDLC SERPL-MCNC: 47 MG/DL (ref ?–130)
PATIENT FASTING: YES
TRIGL SERPL-MCNC: 78 MG/DL (ref 30–149)
VLDLC SERPL CALC-MCNC: 16 MG/DL (ref 0–30)

## 2019-08-08 PROCEDURE — 36415 COLL VENOUS BLD VENIPUNCTURE: CPT

## 2019-08-08 PROCEDURE — 80061 LIPID PANEL: CPT

## 2019-08-13 RX ORDER — LISINOPRIL 40 MG/1
TABLET ORAL
Qty: 90 TABLET | Refills: 1 | Status: SHIPPED | OUTPATIENT
Start: 2019-08-13 | End: 2020-02-06

## 2019-08-13 NOTE — TELEPHONE ENCOUNTER
Refill passed per [facility], [facility] protocol.     Hypertensive Medications  Protocol Criteria:  · Appointment scheduled in the past 6 months or in the next 3 months  · BMP or CMP in the past 12 months  · Creatinine result < 2  Recent Outpatient Visits

## 2019-09-03 ENCOUNTER — TELEPHONE (OUTPATIENT)
Dept: CASE MANAGEMENT | Age: 59
End: 2019-09-03

## 2019-09-03 NOTE — TELEPHONE ENCOUNTER
Patient is due to schedule GI consult for colonoscopy, referral written 3/30/19. Left message to call back [phone].

## 2019-09-09 NOTE — TELEPHONE ENCOUNTER
Returned patients call, informed is due to schedule GI consult for colonoscopy, transferred to GI scheduling.

## 2019-10-03 ENCOUNTER — APPOINTMENT (OUTPATIENT)
Dept: LAB | Age: 59
End: 2019-10-03
Attending: FAMILY MEDICINE
Payer: COMMERCIAL

## 2019-10-03 DIAGNOSIS — E11.9 TYPE 2 DIABETES MELLITUS WITHOUT COMPLICATION, WITHOUT LONG-TERM CURRENT USE OF INSULIN (HCC): ICD-10-CM

## 2019-10-03 DIAGNOSIS — E11.59 TYPE 2 DIABETES MELLITUS WITH OTHER CIRCULATORY COMPLICATION, WITHOUT LONG-TERM CURRENT USE OF INSULIN (HCC): ICD-10-CM

## 2019-10-03 PROCEDURE — 36415 COLL VENOUS BLD VENIPUNCTURE: CPT

## 2019-10-03 PROCEDURE — 82043 UR ALBUMIN QUANTITATIVE: CPT

## 2019-10-03 PROCEDURE — 83036 HEMOGLOBIN GLYCOSYLATED A1C: CPT

## 2019-10-03 PROCEDURE — 80061 LIPID PANEL: CPT

## 2019-10-03 PROCEDURE — 80053 COMPREHEN METABOLIC PANEL: CPT

## 2019-10-03 PROCEDURE — 82570 ASSAY OF URINE CREATININE: CPT

## 2019-10-08 ENCOUNTER — TELEPHONE (OUTPATIENT)
Dept: FAMILY MEDICINE CLINIC | Facility: CLINIC | Age: 59
End: 2019-10-08

## 2019-10-08 NOTE — TELEPHONE ENCOUNTER
----- Message from Alphonso Lundberg RN sent at 99/6/8320  4:26 PM CDT -----  Clinical staff, please assist with normal results, thanks

## 2019-10-08 NOTE — TELEPHONE ENCOUNTER
Notes recorded by Andrea Castro DO on 10/4/2019 at 1:26 PM CDT  Blood sugar stable cholesterol stable liver kidney tests are fine continue on the current medication regimen

## 2019-10-09 NOTE — TELEPHONE ENCOUNTER
Patient called states does not need results of labs has appointment with Dr. Lieberman Friend 10/10/19

## 2019-10-10 ENCOUNTER — TELEPHONE (OUTPATIENT)
Dept: FAMILY MEDICINE CLINIC | Facility: CLINIC | Age: 59
End: 2019-10-10

## 2019-10-10 ENCOUNTER — OFFICE VISIT (OUTPATIENT)
Dept: FAMILY MEDICINE CLINIC | Facility: CLINIC | Age: 59
End: 2019-10-10

## 2019-10-10 VITALS
SYSTOLIC BLOOD PRESSURE: 134 MMHG | DIASTOLIC BLOOD PRESSURE: 80 MMHG | WEIGHT: 216 LBS | HEIGHT: 72 IN | HEART RATE: 73 BPM | BODY MASS INDEX: 29.26 KG/M2

## 2019-10-10 DIAGNOSIS — M19.049 ARTHRITIS OF FINGER: ICD-10-CM

## 2019-10-10 DIAGNOSIS — E78.00 HYPERCHOLESTEREMIA: ICD-10-CM

## 2019-10-10 DIAGNOSIS — K42.9 UMBILICAL HERNIA WITHOUT OBSTRUCTION AND WITHOUT GANGRENE: ICD-10-CM

## 2019-10-10 DIAGNOSIS — I10 ESSENTIAL HYPERTENSION: ICD-10-CM

## 2019-10-10 DIAGNOSIS — I25.10 ATHEROSCLEROSIS OF NATIVE CORONARY ARTERY OF NATIVE HEART WITHOUT ANGINA PECTORIS: ICD-10-CM

## 2019-10-10 DIAGNOSIS — Z12.5 PROSTATE CANCER SCREENING: ICD-10-CM

## 2019-10-10 DIAGNOSIS — Z12.11 COLON CANCER SCREENING: ICD-10-CM

## 2019-10-10 DIAGNOSIS — I65.23 CAROTID ATHEROSCLEROSIS, BILATERAL: ICD-10-CM

## 2019-10-10 DIAGNOSIS — E11.59 TYPE 2 DIABETES MELLITUS WITH OTHER CIRCULATORY COMPLICATION, WITHOUT LONG-TERM CURRENT USE OF INSULIN (HCC): Primary | ICD-10-CM

## 2019-10-10 PROCEDURE — 99215 OFFICE O/P EST HI 40 MIN: CPT | Performed by: FAMILY MEDICINE

## 2019-10-10 NOTE — H&P
[address] [facility] [address] Gastroenterology                                                                                                  Clinic History and Physical     Pa • COLONOSCOPY     • HERNIA SURGERY     • Sharri Rodriguez      as infant for empyema      Family Hx:   Family History   Problem Relation Age of Onset   • Heart Disease Father 71        had [address] and [address] way bypass.    • Cancer Father         Prostat Rfl:         Allergies:    Ciprofloxacin               ROS:   CONSTITUTIONAL:  negative for fevers, rigors  EYES:  negative for diplopia   RESPIRATORY:  negative for severe shortness of breath  CARDIOVASCULAR:  negative for crushing sub-sternal chest pain upper and lower GI perspective without red flag/alarm symptoms. We discussed the colonoscopy procedure and all questions/concerns regarding this were addressed. The patient is amenable to proceeding.       2. Elevated BP reading: If BP remains > 140/90 - of the defined types were placed in this encounter.       Meds This Visit:  Requested Prescriptions     Pending Prescriptions Disp Refills   • PEG 3350-KCl-NaBcb-NaCl-NaSulf (COLYTE WITH FLAVOR PACKS) 240 g Oral Recon Soln 1 Bottle 0     Sig: As directed pe

## 2019-10-10 NOTE — PROGRESS NOTES
Umbilical hernia   Irritation at time  Had previous surgery in 2009 with appendectomy POD H. Siavalis   Has diastasis recti    bp was high this am usually not that derik.     Dm has been under good control at home    No shortness of breath no chest pain controlled    4. Hypercholesteremia  recheck    5. Colon cancer screening  Has referral    6. Carotid atherosclerosis, bilateral  stalbe    7. Arthritis of finger  Referral for rhem  - RHEUMATOLOGY - INTERNAL    8.  Umbilical hernia without obstruction and

## 2019-10-10 NOTE — TELEPHONE ENCOUNTER
Pt requested a copy of his labs from the last year  Please call him and mail the results  See if he wants to get set up with [facility].   thanks

## 2019-10-17 ENCOUNTER — TELEPHONE (OUTPATIENT)
Dept: GASTROENTEROLOGY | Facility: CLINIC | Age: 59
End: 2019-10-17

## 2019-10-17 ENCOUNTER — OFFICE VISIT (OUTPATIENT)
Dept: GASTROENTEROLOGY | Facility: CLINIC | Age: 59
End: 2019-10-17

## 2019-10-17 VITALS
SYSTOLIC BLOOD PRESSURE: 152 MMHG | HEART RATE: 76 BPM | DIASTOLIC BLOOD PRESSURE: 98 MMHG | HEIGHT: 72 IN | BODY MASS INDEX: 29.53 KG/M2 | WEIGHT: 218 LBS

## 2019-10-17 DIAGNOSIS — Z12.11 COLON CANCER SCREENING: Primary | ICD-10-CM

## 2019-10-17 DIAGNOSIS — Z12.11 SCREENING FOR COLON CANCER: Primary | ICD-10-CM

## 2019-10-17 PROCEDURE — 99243 OFF/OP CNSLTJ NEW/EST LOW 30: CPT | Performed by: NURSE PRACTITIONER

## 2019-10-17 RX ORDER — BLOOD-GLUCOSE METER
KIT MISCELLANEOUS
Qty: 100 STRIP | Refills: 3 | Status: SHIPPED | OUTPATIENT
Start: 2019-10-17 | End: 2021-04-22

## 2019-10-17 NOTE — TELEPHONE ENCOUNTER
Scheduled for:  Colonoscopy  [id number]  Provider Name:   Date:  12/30/19  Location:  [facility]  Sedation:  Mac  Time:  0830 --------- Arrival 0730  Prep: Split dose Colyte  Meds/Allergies Reconciled?:  Physician reviewed  Diagnosis with codes:  Colon cancer scr

## 2019-10-17 NOTE — PATIENT INSTRUCTIONS
-Schedule colonoscopy w/Dr. Zeenat Mitchell, Dr. Una Duncan, Dr. Lia Silveira w/ IV Twilight or MAC  Dx: screening  -Eligible for NE: Yes if controlled HTN/non-insulin dependent DM  -Prep: Split dose Colyte or equivalent  -Anti-platelets and anti-coagulants: ASA - contin

## 2019-10-18 NOTE — TELEPHONE ENCOUNTER
Refill passed per [facility], [facility] protocol.   Refill Protocol Appointment Criteria  · Appointment scheduled in the past 12 months or in the next 3 months  Recent Outpatient Visits            1 week ago Type 2 diabetes mellitus with other circulatory complic

## 2019-12-14 RX ORDER — LANCETS 28 GAUGE
EACH MISCELLANEOUS
Qty: 100 EACH | Refills: 3 | Status: SHIPPED | OUTPATIENT
Start: 2019-12-14 | End: 2021-08-09

## 2019-12-15 NOTE — TELEPHONE ENCOUNTER
Refill passed per [facility] protocol.     Diabetic Supplies  Protocol Criteria:  · Appointment scheduled in past 12 months or the next 3 months

## 2019-12-30 ENCOUNTER — ANESTHESIA (OUTPATIENT)
Dept: ENDOSCOPY | Facility: HOSPITAL | Age: 59
End: 2019-12-30
Payer: COMMERCIAL

## 2019-12-30 ENCOUNTER — HOSPITAL ENCOUNTER (OUTPATIENT)
Facility: HOSPITAL | Age: 59
Setting detail: HOSPITAL OUTPATIENT SURGERY
Discharge: HOME | End: 2019-12-30
Attending: INTERNAL MEDICINE | Admitting: INTERNAL MEDICINE
Payer: COMMERCIAL

## 2019-12-30 ENCOUNTER — ANESTHESIA EVENT (OUTPATIENT)
Dept: ENDOSCOPY | Facility: HOSPITAL | Age: 59
End: 2019-12-30
Payer: COMMERCIAL

## 2019-12-30 VITALS
RESPIRATION RATE: 14 BRPM | HEIGHT: 72 IN | TEMPERATURE: 98 F | HEART RATE: 84 BPM | BODY MASS INDEX: 29.8 KG/M2 | OXYGEN SATURATION: 96 % | SYSTOLIC BLOOD PRESSURE: 124 MMHG | WEIGHT: 220 LBS | DIASTOLIC BLOOD PRESSURE: 77 MMHG

## 2019-12-30 DIAGNOSIS — Z12.11 COLON CANCER SCREENING: ICD-10-CM

## 2019-12-30 PROCEDURE — 45385 COLONOSCOPY W/LESION REMOVAL: CPT | Performed by: INTERNAL MEDICINE

## 2019-12-30 PROCEDURE — 0DBH8ZX EXCISION OF CECUM, VIA NATURAL OR ARTIFICIAL OPENING ENDOSCOPIC, DIAGNOSTIC: ICD-10-PCS | Performed by: INTERNAL MEDICINE

## 2019-12-30 RX ORDER — DEXTROSE MONOHYDRATE 25 G/50ML
50 INJECTION, SOLUTION INTRAVENOUS
Status: DISCONTINUED | OUTPATIENT
Start: 2019-12-30 | End: 2019-12-30

## 2019-12-30 RX ORDER — LIDOCAINE HYDROCHLORIDE 10 MG/ML
INJECTION, SOLUTION EPIDURAL; INFILTRATION; INTRACAUDAL; PERINEURAL AS NEEDED
Status: DISCONTINUED | OUTPATIENT
Start: 2019-12-30 | End: 2019-12-30 | Stop reason: SURG

## 2019-12-30 RX ORDER — SODIUM CHLORIDE, SODIUM LACTATE, POTASSIUM CHLORIDE, CALCIUM CHLORIDE 600; 310; 30; 20 MG/100ML; MG/100ML; MG/100ML; MG/100ML
INJECTION, SOLUTION INTRAVENOUS CONTINUOUS
Status: DISCONTINUED | OUTPATIENT
Start: 2019-12-30 | End: 2019-12-30

## 2019-12-30 RX ORDER — NALOXONE HYDROCHLORIDE 0.4 MG/ML
80 INJECTION, SOLUTION INTRAMUSCULAR; INTRAVENOUS; SUBCUTANEOUS AS NEEDED
Status: DISCONTINUED | OUTPATIENT
Start: 2019-12-30 | End: 2019-12-30

## 2019-12-30 RX ADMIN — SODIUM CHLORIDE, SODIUM LACTATE, POTASSIUM CHLORIDE, CALCIUM CHLORIDE: 600; 310; 30; 20 INJECTION, SOLUTION INTRAVENOUS at 08:31:00

## 2019-12-30 RX ADMIN — LIDOCAINE HYDROCHLORIDE 50 MG: 10 INJECTION, SOLUTION EPIDURAL; INFILTRATION; INTRACAUDAL; PERINEURAL at 08:33:00

## 2019-12-30 NOTE — ANESTHESIA POSTPROCEDURE EVALUATION
Patient: Lex Lin    Procedure Summary     Date:  12/30/19 Room / Location:  [address] [place] [address] ENDOSCOPY 01 / [address] [place] [address] ENDOSCOPY    Anesthesia Start:  0831 Anesthesia Stop:  7241    Procedure:  COLONOSCOPY (N/A ) Diagnosis:       Colon cancer screening      (colon poly

## 2019-12-30 NOTE — H&P
History & Physical Examination    Patient Name: Pretty Ogden  MRN: [id number]  CSN: [id number]  YOB: 1960    Diagnosis: Colorectal cancer screening      FREESTYLE LANCETS Does not apply Misc, USE 1  TO CHECK GLUCOSE ONCE DAILY, Disp: 100 ea mellitus without mention of complication, not stated as uncontrolled    • Unspecified essential hypertension      Past Surgical History:   Procedure Laterality Date   • APPENDECTOMY     • COLONOSCOPY     • HERNIA SURGERY     • THORACOTOMY,EXCIS BULLAE

## 2019-12-30 NOTE — ANESTHESIA PREPROCEDURE EVALUATION
Anesthesia PreOp Note    HPI:     Zina Pineda is a 60year old male who presents for preoperative consultation requested by: Shady Fuentes MD    Date of Surgery: 12/30/2019    Procedure(s):  COLONOSCOPY  Indication: Colon cancer screening    Rel (FREESTYLE FREEDOM LITE) w/Device Does not apply Kit, Check blood sugar once daily, Disp: 1 kit, Rfl: 0, 12/29/2019  Blood Glucose Calibration (FREESTYLE CONTROL SOLUTION) In Vitro Liquid, Check blood sugar once daily, Disp: 3 each, Rfl: 3, 12/29/2019  Asc file        Minutes per session: Not on file      Stress: Not on file    Relationships      Social connections:        Talks on phone: Not on file        Gets together: Not on file        Attends Bahai service: Not on file        Active member of [employer]  Informed Consent Plan and Risks Discussed With:  Patient and spouse  Discussed plan with:  Surgeon      I have informed Mennie Render and/or legal guardian or family member of the nature of the anesthetic plan, benefits, risks including possib

## 2019-12-30 NOTE — OPERATIVE REPORT
[facility] Endoscopy Report      Date of Procedure:  12/30/19      Preoperative Diagnosis:  Colorectal cancer screening      Postoperative Diagnosis:  1. Colon polyp  2.   Diverticulosis left colon      Procedure:    Colonoscopy with polype Follow-up biopsy results. Polyp histology to determine recommendations regarding follow-up.         Jan Thakur MD  12/30/2019

## 2020-02-06 RX ORDER — LISINOPRIL 40 MG/1
TABLET ORAL
Qty: 90 TABLET | Refills: 0 | Status: SHIPPED | OUTPATIENT
Start: 2020-02-06 | End: 2020-02-06

## 2020-02-06 RX ORDER — LISINOPRIL 40 MG/1
TABLET ORAL
Qty: 90 TABLET | Refills: 0 | Status: SHIPPED | OUTPATIENT
Start: 2020-02-06 | End: 2020-05-08

## 2020-02-08 ENCOUNTER — LAB ENCOUNTER (OUTPATIENT)
Dept: LAB | Age: 60
End: 2020-02-08
Attending: FAMILY MEDICINE
Payer: COMMERCIAL

## 2020-02-08 DIAGNOSIS — E11.59 TYPE 2 DIABETES MELLITUS WITH OTHER CIRCULATORY COMPLICATION, WITHOUT LONG-TERM CURRENT USE OF INSULIN (HCC): ICD-10-CM

## 2020-02-08 DIAGNOSIS — Z12.5 PROSTATE CANCER SCREENING: ICD-10-CM

## 2020-02-08 LAB
ALBUMIN SERPL-MCNC: 3.8 G/DL (ref 3.4–5)
ALBUMIN/GLOB SERPL: 1.2 {RATIO} (ref 1–2)
ALP LIVER SERPL-CCNC: 51 U/L (ref 45–117)
ALT SERPL-CCNC: 34 U/L (ref 16–61)
ANION GAP SERPL CALC-SCNC: 6 MMOL/L (ref 0–18)
AST SERPL-CCNC: 26 U/L (ref 15–37)
BASOPHILS # BLD AUTO: 0.11 X10(3) UL (ref 0–0.2)
BASOPHILS NFR BLD AUTO: 1.9 %
BILIRUB SERPL-MCNC: 0.6 MG/DL (ref 0.1–2)
BUN BLD-MCNC: 15 MG/DL (ref 7–18)
BUN/CREAT SERPL: 15.2 (ref 10–20)
CALCIUM BLD-MCNC: 9.6 MG/DL (ref 8.5–10.1)
CHLORIDE SERPL-SCNC: 108 MMOL/L (ref 98–112)
CHOLEST SMN-MCNC: 95 MG/DL (ref ?–200)
CO2 SERPL-SCNC: 27 MMOL/L (ref 21–32)
COMPLEXED PSA SERPL-MCNC: 1.08 NG/ML (ref ?–4)
CREAT BLD-MCNC: 0.99 MG/DL (ref 0.7–1.3)
CREAT UR-SCNC: 53.8 MG/DL
DEPRECATED RDW RBC AUTO: 39.8 FL (ref 35.1–46.3)
EOSINOPHIL # BLD AUTO: 0.17 X10(3) UL (ref 0–0.7)
EOSINOPHIL NFR BLD AUTO: 2.9 %
ERYTHROCYTE [DISTWIDTH] IN BLOOD BY AUTOMATED COUNT: 12.8 % (ref 11–15)
EST. AVERAGE GLUCOSE BLD GHB EST-MCNC: 151 MG/DL (ref 68–126)
GLOBULIN PLAS-MCNC: 3.2 G/DL (ref 2.8–4.4)
GLUCOSE BLD-MCNC: 127 MG/DL (ref 70–99)
HBA1C MFR BLD HPLC: 6.9 % (ref ?–5.7)
HCT VFR BLD AUTO: 42.7 % (ref 39–53)
HDLC SERPL-MCNC: 64 MG/DL (ref 40–59)
HGB BLD-MCNC: 14.5 G/DL (ref 13–17.5)
IMM GRANULOCYTES # BLD AUTO: 0.01 X10(3) UL (ref 0–1)
IMM GRANULOCYTES NFR BLD: 0.2 %
LDLC SERPL CALC-MCNC: 20 MG/DL (ref ?–100)
LYMPHOCYTES # BLD AUTO: 1.86 X10(3) UL (ref 1–4)
LYMPHOCYTES NFR BLD AUTO: 31.3 %
M PROTEIN MFR SERPL ELPH: 7 G/DL (ref 6.4–8.2)
MCH RBC QN AUTO: 29.4 PG (ref 26–34)
MCHC RBC AUTO-ENTMCNC: 34 G/DL (ref 31–37)
MCV RBC AUTO: 86.6 FL (ref 80–100)
MICROALBUMIN UR-MCNC: <0.5 MG/DL
MONOCYTES # BLD AUTO: 0.61 X10(3) UL (ref 0.1–1)
MONOCYTES NFR BLD AUTO: 10.3 %
NEUTROPHILS # BLD AUTO: 3.18 X10 (3) UL (ref 1.5–7.7)
NEUTROPHILS # BLD AUTO: 3.18 X10(3) UL (ref 1.5–7.7)
NEUTROPHILS NFR BLD AUTO: 53.4 %
NONHDLC SERPL-MCNC: 31 MG/DL (ref ?–130)
OSMOLALITY SERPL CALC.SUM OF ELEC: 294 MOSM/KG (ref 275–295)
PATIENT FASTING Y/N/NP: YES
PATIENT FASTING Y/N/NP: YES
PLATELET # BLD AUTO: 236 10(3)UL (ref 150–450)
POTASSIUM SERPL-SCNC: 4.6 MMOL/L (ref 3.5–5.1)
RBC # BLD AUTO: 4.93 X10(6)UL (ref 4.3–5.7)
SODIUM SERPL-SCNC: 141 MMOL/L (ref 136–145)
TRIGL SERPL-MCNC: 54 MG/DL (ref 30–149)
VLDLC SERPL CALC-MCNC: 11 MG/DL (ref 0–30)
WBC # BLD AUTO: 5.9 X10(3) UL (ref 4–11)

## 2020-02-08 PROCEDURE — 85025 COMPLETE CBC W/AUTO DIFF WBC: CPT

## 2020-02-08 PROCEDURE — 83036 HEMOGLOBIN GLYCOSYLATED A1C: CPT

## 2020-02-08 PROCEDURE — 82570 ASSAY OF URINE CREATININE: CPT

## 2020-02-08 PROCEDURE — 36415 COLL VENOUS BLD VENIPUNCTURE: CPT

## 2020-02-08 PROCEDURE — 80061 LIPID PANEL: CPT

## 2020-02-08 PROCEDURE — 80053 COMPREHEN METABOLIC PANEL: CPT

## 2020-02-08 PROCEDURE — 82043 UR ALBUMIN QUANTITATIVE: CPT

## 2020-02-10 ENCOUNTER — TELEPHONE (OUTPATIENT)
Dept: FAMILY MEDICINE CLINIC | Facility: CLINIC | Age: 60
End: 2020-02-10

## 2020-02-10 NOTE — TELEPHONE ENCOUNTER
Reggie Garcia DO  P Em [facility] Lpn/Cma             Cholesterol is good blood sugars controlled PSA was normal CBC was normal liver kidney tests were fine.  Continue with current medication regimen

## 2020-02-11 ENCOUNTER — LAB ENCOUNTER (OUTPATIENT)
Dept: LAB | Age: 60
End: 2020-02-11
Attending: INTERNAL MEDICINE
Payer: COMMERCIAL

## 2020-02-11 DIAGNOSIS — I25.10 CORONARY ATHEROSCLEROSIS OF NATIVE CORONARY ARTERY: Primary | ICD-10-CM

## 2020-02-11 LAB
ANION GAP SERPL CALC-SCNC: 4 MMOL/L (ref 0–18)
BUN BLD-MCNC: 18 MG/DL (ref 7–18)
BUN/CREAT SERPL: 18.4 (ref 10–20)
CALCIUM BLD-MCNC: 9.4 MG/DL (ref 8.5–10.1)
CHLORIDE SERPL-SCNC: 109 MMOL/L (ref 98–112)
CO2 SERPL-SCNC: 28 MMOL/L (ref 21–32)
CREAT BLD-MCNC: 0.98 MG/DL (ref 0.7–1.3)
GLUCOSE BLD-MCNC: 129 MG/DL (ref 70–99)
OSMOLALITY SERPL CALC.SUM OF ELEC: 296 MOSM/KG (ref 275–295)
PATIENT FASTING Y/N/NP: YES
POTASSIUM SERPL-SCNC: 4.6 MMOL/L (ref 3.5–5.1)
SODIUM SERPL-SCNC: 141 MMOL/L (ref 136–145)

## 2020-02-11 PROCEDURE — 36415 COLL VENOUS BLD VENIPUNCTURE: CPT

## 2020-02-11 PROCEDURE — 80048 BASIC METABOLIC PNL TOTAL CA: CPT

## 2020-02-15 ENCOUNTER — MED REC SCAN ONLY (OUTPATIENT)
Dept: FAMILY MEDICINE CLINIC | Facility: CLINIC | Age: 60
End: 2020-02-15

## 2020-02-26 ENCOUNTER — TELEPHONE (OUTPATIENT)
Dept: OTHER | Age: 60
End: 2020-02-26

## 2020-02-26 NOTE — TELEPHONE ENCOUNTER
Patient calling back to update his insurance  Info.   Warm transfer to [facility] agent Priyanka Arvizu

## 2020-03-26 RX ORDER — FINASTERIDE 5 MG/1
TABLET, FILM COATED ORAL
Qty: 30 TABLET | Refills: 1 | Status: SHIPPED | OUTPATIENT
Start: 2020-03-26 | End: 2020-05-19

## 2020-04-07 RX ORDER — ROSUVASTATIN CALCIUM 40 MG/1
TABLET, COATED ORAL
Qty: 90 TABLET | Refills: 0 | Status: SHIPPED | OUTPATIENT
Start: 2020-04-07 | End: 2020-07-15

## 2020-05-08 ENCOUNTER — TELEPHONE (OUTPATIENT)
Dept: FAMILY MEDICINE CLINIC | Facility: CLINIC | Age: 60
End: 2020-05-08

## 2020-05-08 RX ORDER — LISINOPRIL 40 MG/1
40 TABLET ORAL DAILY
Qty: 90 TABLET | Refills: 0 | Status: SHIPPED | OUTPATIENT
Start: 2020-05-08 | End: 2020-05-09

## 2020-05-08 NOTE — TELEPHONE ENCOUNTER
Pharmacy requesting a refill.     LISINOPRIL 40 MG Oral Tab 90 tablet 0 2/6/2020    Sig:   TAKE ONE TABLET BY MOUTH ONE TIME DAILY      Route:   (none)     Order #:   [id number]

## 2020-05-09 RX ORDER — LISINOPRIL 40 MG/1
40 TABLET ORAL DAILY
Qty: 90 TABLET | Refills: 0 | Status: SHIPPED | OUTPATIENT
Start: 2020-05-09 | End: 2020-10-30

## 2020-05-19 RX ORDER — FINASTERIDE 5 MG/1
5 TABLET, FILM COATED ORAL DAILY
Qty: 30 TABLET | Refills: 1 | Status: SHIPPED | OUTPATIENT
Start: 2020-05-19 | End: 2020-07-14

## 2020-07-14 RX ORDER — FINASTERIDE 5 MG/1
TABLET, FILM COATED ORAL
Qty: 30 TABLET | Refills: 0 | Status: SHIPPED | OUTPATIENT
Start: 2020-07-14 | End: 2020-08-12

## 2020-07-15 RX ORDER — ROSUVASTATIN CALCIUM 40 MG/1
40 TABLET, COATED ORAL NIGHTLY
Qty: 90 TABLET | Refills: 0 | Status: SHIPPED | OUTPATIENT
Start: 2020-07-15 | End: 2020-10-23

## 2020-07-23 ENCOUNTER — APPOINTMENT (OUTPATIENT)
Dept: LAB | Facility: HOSPITAL | Age: 60
End: 2020-07-23
Attending: FAMILY MEDICINE
Payer: COMMERCIAL

## 2020-07-30 ENCOUNTER — OFFICE VISIT (OUTPATIENT)
Dept: FAMILY MEDICINE CLINIC | Facility: CLINIC | Age: 60
End: 2020-07-30
Payer: COMMERCIAL

## 2020-07-30 VITALS
HEART RATE: 68 BPM | BODY MASS INDEX: 28.99 KG/M2 | SYSTOLIC BLOOD PRESSURE: 124 MMHG | DIASTOLIC BLOOD PRESSURE: 78 MMHG | HEIGHT: 72 IN | WEIGHT: 214 LBS

## 2020-07-30 DIAGNOSIS — E78.00 HYPERCHOLESTEREMIA: Primary | ICD-10-CM

## 2020-07-30 DIAGNOSIS — E11.9 CONTROLLED TYPE 2 DIABETES MELLITUS WITHOUT COMPLICATION, WITHOUT LONG-TERM CURRENT USE OF INSULIN (HCC): ICD-10-CM

## 2020-07-30 PROCEDURE — 99212 OFFICE O/P EST SF 10 MIN: CPT | Performed by: NURSE PRACTITIONER

## 2020-07-30 PROCEDURE — 3078F DIAST BP <80 MM HG: CPT | Performed by: NURSE PRACTITIONER

## 2020-07-30 PROCEDURE — 99213 OFFICE O/P EST LOW 20 MIN: CPT | Performed by: NURSE PRACTITIONER

## 2020-07-30 PROCEDURE — 3008F BODY MASS INDEX DOCD: CPT | Performed by: NURSE PRACTITIONER

## 2020-07-30 PROCEDURE — 3074F SYST BP LT 130 MM HG: CPT | Performed by: NURSE PRACTITIONER

## 2020-07-30 NOTE — PATIENT INSTRUCTIONS
Diabetes: Tracking Your Fitness Progress    Tracking your fitness progress can help you improve your long-term health. Seeing how far you’ve come may motivate you to do more. Your doctor can also use a record of your progress to help plan your treatment. Your fitness reward  Your chances of reaching a goal increase if you plan a reward. Write down a nonfood reward that matters to you. For instance, you might reward yourself with a night at the movies, new workout clothes, or relaxing music.    StayWell last · 1/2 cup of canned or frozen fruit  · A small piece of fresh fruit (4 ounces)  · 1 slice of bread  · 1/2 cup of oatmeal  · 1/3 cup of rice  · 4 to 6 crackers  · 1/2 English muffin  · 1/2 cup of black beans  · 1/4 of a large baked potato (3 ounces)  · 2/3 · Learn to read food labels. Be sure to look at serving size, total carbohydrates, fiber, calories, sugar, and saturated and trans fats. Look for healthier alternatives to foods that have added sugar.   · Plan ahead for parties so you can still have a good [employer]rita last reviewed this educational content on 11/1/2018  © 5083-2018 [employer]. [address] [facility], [facility] [place]. All rights reserved. This information is not intended as a substitute for professional medical care.  Always foll

## 2020-07-30 NOTE — PROGRESS NOTES
HPI  Pt presents for general check up. Had labs done in Feb but did not f/u due to Covid. bs in am 120-121  Does not exercise as much as he should. Has f/u with Dr Mateo Vargas next month. 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    Gets covid t Unspecified essential hypertension        .   Past Surgical History:   Procedure Laterality Date   • Appendectomy     • Colonoscopy     • Colonoscopy N/A 12/30/2019    Procedure: COLONOSCOPY;  Surgeon: Leidy Coy MD;  Location: [address] [place] [address] ENDOSCOPY   • Not on file    Other Topics      Concerns:        Not on file    Social History Narrative      Not on file      Current Outpatient Medications   Medication Sig Dispense Refill   • Rosuvastatin Calcium 40 MG Oral Tab Take 1 tablet (40 mg total) by mouth nig sounds. No murmur heard. Pulmonary/Chest: Effort normal and breath sounds normal. No respiratory distress. He has no wheezes. He has no rales. He exhibits no tenderness. Abdominal: Soft. Bowel sounds are normal. He exhibits no distension.  There is no

## 2020-07-31 ENCOUNTER — LAB REQUISITION (OUTPATIENT)
Dept: LAB | Facility: HOSPITAL | Age: 60
End: 2020-07-31
Payer: COMMERCIAL

## 2020-07-31 DIAGNOSIS — Z20.828 CONTACT WITH AND (SUSPECTED) EXPOSURE TO OTHER VIRAL COMMUNICABLE DISEASES: ICD-10-CM

## 2020-08-02 LAB — SARS-COV-2 BY PCR: NOT DETECTED

## 2020-08-06 ENCOUNTER — LAB ENCOUNTER (OUTPATIENT)
Dept: LAB | Age: 60
End: 2020-08-06
Attending: INTERNAL MEDICINE
Payer: COMMERCIAL

## 2020-08-06 DIAGNOSIS — E78.5 HYPERLIPIDEMIA: ICD-10-CM

## 2020-08-06 DIAGNOSIS — I10 ESSENTIAL HYPERTENSION, MALIGNANT: Primary | ICD-10-CM

## 2020-08-06 LAB
ANION GAP SERPL CALC-SCNC: 4 MMOL/L (ref 0–18)
BUN BLD-MCNC: 12 MG/DL (ref 7–18)
BUN/CREAT SERPL: 11.3 (ref 10–20)
CALCIUM BLD-MCNC: 9 MG/DL (ref 8.5–10.1)
CHLORIDE SERPL-SCNC: 108 MMOL/L (ref 98–112)
CHOLEST SMN-MCNC: 102 MG/DL (ref ?–200)
CO2 SERPL-SCNC: 28 MMOL/L (ref 21–32)
CREAT BLD-MCNC: 1.06 MG/DL (ref 0.7–1.3)
GLUCOSE BLD-MCNC: 124 MG/DL (ref 70–99)
HDLC SERPL-MCNC: 66 MG/DL (ref 40–59)
LDLC SERPL CALC-MCNC: 25 MG/DL (ref ?–100)
NONHDLC SERPL-MCNC: 36 MG/DL (ref ?–130)
OSMOLALITY SERPL CALC.SUM OF ELEC: 291 MOSM/KG (ref 275–295)
PATIENT FASTING Y/N/NP: YES
PATIENT FASTING Y/N/NP: YES
POTASSIUM SERPL-SCNC: 4.3 MMOL/L (ref 3.5–5.1)
SODIUM SERPL-SCNC: 140 MMOL/L (ref 136–145)
TRIGL SERPL-MCNC: 57 MG/DL (ref 30–149)
VLDLC SERPL CALC-MCNC: 11 MG/DL (ref 0–30)

## 2020-08-06 PROCEDURE — 80048 BASIC METABOLIC PNL TOTAL CA: CPT

## 2020-08-06 PROCEDURE — 80061 LIPID PANEL: CPT

## 2020-08-06 PROCEDURE — 36415 COLL VENOUS BLD VENIPUNCTURE: CPT

## 2020-08-12 RX ORDER — FINASTERIDE 5 MG/1
TABLET, FILM COATED ORAL
Qty: 30 TABLET | Refills: 0 | Status: SHIPPED | OUTPATIENT
Start: 2020-08-12 | End: 2020-08-13

## 2020-08-12 NOTE — TELEPHONE ENCOUNTER
Patient returned call. Stated \"I just had an appointment with Shan Ham on 7/30/20. I don't need an appointment for another 6 months. \"    Shan Ham, please advise

## 2020-08-13 RX ORDER — FINASTERIDE 5 MG/1
5 TABLET, FILM COATED ORAL DAILY
Qty: 90 TABLET | Refills: 1 | Status: SHIPPED | OUTPATIENT
Start: 2020-08-13 | End: 2021-02-24

## 2020-09-05 ENCOUNTER — LAB REQUISITION (OUTPATIENT)
Dept: LAB | Facility: HOSPITAL | Age: 60
End: 2020-09-05
Payer: COMMERCIAL

## 2020-09-05 DIAGNOSIS — Z20.828 CONTACT WITH AND (SUSPECTED) EXPOSURE TO OTHER VIRAL COMMUNICABLE DISEASES: ICD-10-CM

## 2020-09-08 LAB — SARS-COV-2 BY PCR: NOT DETECTED

## 2020-09-18 ENCOUNTER — LAB REQUISITION (OUTPATIENT)
Dept: LAB | Facility: HOSPITAL | Age: 60
End: 2020-09-18
Payer: COMMERCIAL

## 2020-09-18 DIAGNOSIS — Z20.828 CONTACT WITH AND (SUSPECTED) EXPOSURE TO OTHER VIRAL COMMUNICABLE DISEASES: ICD-10-CM

## 2020-09-21 LAB — SARS-COV-2 BY PCR: NOT DETECTED

## 2020-10-03 ENCOUNTER — LAB REQUISITION (OUTPATIENT)
Dept: LAB | Facility: HOSPITAL | Age: 60
End: 2020-10-03
Payer: COMMERCIAL

## 2020-10-03 DIAGNOSIS — Z20.828 CONTACT WITH AND (SUSPECTED) EXPOSURE TO OTHER VIRAL COMMUNICABLE DISEASES: ICD-10-CM

## 2020-10-09 ENCOUNTER — LAB REQUISITION (OUTPATIENT)
Dept: LAB | Facility: HOSPITAL | Age: 60
End: 2020-10-09
Payer: COMMERCIAL

## 2020-10-09 DIAGNOSIS — Z20.828 CONTACT WITH AND (SUSPECTED) EXPOSURE TO OTHER VIRAL COMMUNICABLE DISEASES: ICD-10-CM

## 2020-10-15 ENCOUNTER — LAB REQUISITION (OUTPATIENT)
Dept: LAB | Facility: HOSPITAL | Age: 60
End: 2020-10-15
Payer: COMMERCIAL

## 2020-10-15 DIAGNOSIS — Z20.828 CONTACT WITH AND (SUSPECTED) EXPOSURE TO OTHER VIRAL COMMUNICABLE DISEASES: ICD-10-CM

## 2020-10-16 RX ORDER — ROSUVASTATIN CALCIUM 40 MG/1
40 TABLET, COATED ORAL NIGHTLY
Qty: 90 TABLET | Refills: 0 | OUTPATIENT
Start: 2020-10-16

## 2020-10-22 ENCOUNTER — LAB REQUISITION (OUTPATIENT)
Dept: LAB | Facility: HOSPITAL | Age: 60
End: 2020-10-22
Payer: COMMERCIAL

## 2020-10-22 DIAGNOSIS — Z20.828 CONTACT WITH AND (SUSPECTED) EXPOSURE TO OTHER VIRAL COMMUNICABLE DISEASES: ICD-10-CM

## 2020-10-23 RX ORDER — ROSUVASTATIN CALCIUM 40 MG/1
40 TABLET, COATED ORAL NIGHTLY
Qty: 90 TABLET | Refills: 0 | Status: SHIPPED | OUTPATIENT
Start: 2020-10-23 | End: 2021-01-14

## 2020-10-23 NOTE — TELEPHONE ENCOUNTER
Pt called to follow up on refill request, see 10/6/2020 encounter. Pt states Shayla SADLER is his provider, med last refilled by Gabrielle ADAM.

## 2020-10-23 NOTE — TELEPHONE ENCOUNTER
Pt called to follow up on refill request. States he is out of Rosuvastatin and needs med refilled. Pt states Radha SADLER is his provider. Please see 10/23/2020 Refill request encounter.

## 2020-10-30 ENCOUNTER — LAB REQUISITION (OUTPATIENT)
Dept: LAB | Facility: HOSPITAL | Age: 60
End: 2020-10-30
Payer: COMMERCIAL

## 2020-10-30 DIAGNOSIS — Z20.828 CONTACT WITH AND (SUSPECTED) EXPOSURE TO OTHER VIRAL COMMUNICABLE DISEASES: ICD-10-CM

## 2020-10-30 RX ORDER — LISINOPRIL 40 MG/1
40 TABLET ORAL DAILY
Qty: 90 TABLET | Refills: 1 | Status: SHIPPED | OUTPATIENT
Start: 2020-10-30 | End: 2021-05-11

## 2020-11-06 ENCOUNTER — LAB REQUISITION (OUTPATIENT)
Dept: LAB | Facility: HOSPITAL | Age: 60
End: 2020-11-06
Payer: COMMERCIAL

## 2020-11-06 DIAGNOSIS — Z20.828 CONTACT WITH AND (SUSPECTED) EXPOSURE TO OTHER VIRAL COMMUNICABLE DISEASES: ICD-10-CM

## 2020-11-12 ENCOUNTER — LAB REQUISITION (OUTPATIENT)
Dept: LAB | Facility: HOSPITAL | Age: 60
End: 2020-11-12
Payer: COMMERCIAL

## 2020-11-12 DIAGNOSIS — Z20.828 CONTACT WITH AND (SUSPECTED) EXPOSURE TO OTHER VIRAL COMMUNICABLE DISEASES: ICD-10-CM

## 2020-11-18 ENCOUNTER — LAB REQUISITION (OUTPATIENT)
Dept: LAB | Facility: HOSPITAL | Age: 60
End: 2020-11-18
Payer: COMMERCIAL

## 2020-11-18 DIAGNOSIS — Z20.828 CONTACT WITH AND (SUSPECTED) EXPOSURE TO OTHER VIRAL COMMUNICABLE DISEASES: ICD-10-CM

## 2020-11-19 ENCOUNTER — LAB REQUISITION (OUTPATIENT)
Dept: LAB | Facility: HOSPITAL | Age: 60
End: 2020-11-19
Payer: COMMERCIAL

## 2020-11-19 DIAGNOSIS — Z20.828 CONTACT WITH AND (SUSPECTED) EXPOSURE TO OTHER VIRAL COMMUNICABLE DISEASES: ICD-10-CM

## 2021-01-08 ENCOUNTER — TELEPHONE (OUTPATIENT)
Dept: FAMILY MEDICINE CLINIC | Facility: CLINIC | Age: 61
End: 2021-01-08

## 2021-01-08 NOTE — TELEPHONE ENCOUNTER
[facility] in [address] [place] [address] is requesting a refill for medication, Metformin HCL 1000 MG Oral Tab. Patient of Dr. Jennifer Garcia.

## 2021-01-11 NOTE — TELEPHONE ENCOUNTER
Flak[facility]/ [facility] is calling to follow up on status of refill request for patient's medication.

## 2021-01-14 RX ORDER — ROSUVASTATIN CALCIUM 40 MG/1
40 TABLET, COATED ORAL NIGHTLY
Qty: 90 TABLET | Refills: 0 | Status: SHIPPED | OUTPATIENT
Start: 2021-01-14 | End: 2021-04-13

## 2021-01-22 NOTE — TELEPHONE ENCOUNTER
Patient scheduled follow up on 2/6. Asking if refill can be approved.  He only has enough to last him until Monday

## 2021-01-28 ENCOUNTER — LAB ENCOUNTER (OUTPATIENT)
Dept: LAB | Age: 61
End: 2021-01-28
Attending: NURSE PRACTITIONER
Payer: COMMERCIAL

## 2021-01-28 DIAGNOSIS — E11.9 CONTROLLED TYPE 2 DIABETES MELLITUS WITHOUT COMPLICATION, WITHOUT LONG-TERM CURRENT USE OF INSULIN (HCC): ICD-10-CM

## 2021-01-28 DIAGNOSIS — E78.00 HYPERCHOLESTEREMIA: ICD-10-CM

## 2021-01-28 LAB
ALBUMIN SERPL-MCNC: 4 G/DL (ref 3.4–5)
ALBUMIN/GLOB SERPL: 1.2 {RATIO} (ref 1–2)
ALP LIVER SERPL-CCNC: 54 U/L
ALT SERPL-CCNC: 34 U/L
ANION GAP SERPL CALC-SCNC: 6 MMOL/L (ref 0–18)
AST SERPL-CCNC: 19 U/L (ref 15–37)
BILIRUB SERPL-MCNC: 0.8 MG/DL (ref 0.1–2)
BUN BLD-MCNC: 20 MG/DL (ref 7–18)
BUN/CREAT SERPL: 18.3 (ref 10–20)
CALCIUM BLD-MCNC: 9.7 MG/DL (ref 8.5–10.1)
CHLORIDE SERPL-SCNC: 108 MMOL/L (ref 98–112)
CHOLEST SMN-MCNC: 99 MG/DL (ref ?–200)
CO2 SERPL-SCNC: 27 MMOL/L (ref 21–32)
CREAT BLD-MCNC: 1.09 MG/DL
EST. AVERAGE GLUCOSE BLD GHB EST-MCNC: 148 MG/DL (ref 68–126)
GLOBULIN PLAS-MCNC: 3.4 G/DL (ref 2.8–4.4)
GLUCOSE BLD-MCNC: 131 MG/DL (ref 70–99)
HBA1C MFR BLD HPLC: 6.8 % (ref ?–5.7)
HDLC SERPL-MCNC: 70 MG/DL (ref 40–59)
LDLC SERPL CALC-MCNC: 19 MG/DL (ref ?–100)
M PROTEIN MFR SERPL ELPH: 7.4 G/DL (ref 6.4–8.2)
NONHDLC SERPL-MCNC: 29 MG/DL (ref ?–130)
OSMOLALITY SERPL CALC.SUM OF ELEC: 296 MOSM/KG (ref 275–295)
PATIENT FASTING Y/N/NP: YES
PATIENT FASTING Y/N/NP: YES
POTASSIUM SERPL-SCNC: 4.5 MMOL/L (ref 3.5–5.1)
SODIUM SERPL-SCNC: 141 MMOL/L (ref 136–145)
TRIGL SERPL-MCNC: 52 MG/DL (ref 30–149)
VLDLC SERPL CALC-MCNC: 10 MG/DL (ref 0–30)

## 2021-01-28 PROCEDURE — 36415 COLL VENOUS BLD VENIPUNCTURE: CPT

## 2021-01-28 PROCEDURE — 80061 LIPID PANEL: CPT

## 2021-01-28 PROCEDURE — 80053 COMPREHEN METABOLIC PANEL: CPT

## 2021-01-28 PROCEDURE — 83036 HEMOGLOBIN GLYCOSYLATED A1C: CPT

## 2021-01-28 PROCEDURE — 3044F HG A1C LEVEL LT 7.0%: CPT | Performed by: NURSE PRACTITIONER

## 2021-02-06 ENCOUNTER — OFFICE VISIT (OUTPATIENT)
Dept: FAMILY MEDICINE CLINIC | Facility: CLINIC | Age: 61
End: 2021-02-06
Payer: COMMERCIAL

## 2021-02-06 VITALS
WEIGHT: 213 LBS | HEIGHT: 72 IN | BODY MASS INDEX: 28.85 KG/M2 | DIASTOLIC BLOOD PRESSURE: 79 MMHG | SYSTOLIC BLOOD PRESSURE: 124 MMHG

## 2021-02-06 DIAGNOSIS — E78.00 HYPERCHOLESTEREMIA: Primary | ICD-10-CM

## 2021-02-06 DIAGNOSIS — Z00.00 WELL ADULT EXAM: ICD-10-CM

## 2021-02-06 DIAGNOSIS — E11.9 CONTROLLED TYPE 2 DIABETES MELLITUS WITHOUT COMPLICATION, WITHOUT LONG-TERM CURRENT USE OF INSULIN (HCC): ICD-10-CM

## 2021-02-06 DIAGNOSIS — Z12.5 SCREENING PSA (PROSTATE SPECIFIC ANTIGEN): ICD-10-CM

## 2021-02-06 PROBLEM — Z87.438 HISTORY OF BPH: Status: ACTIVE | Noted: 2021-02-06

## 2021-02-06 PROBLEM — I10 ESSENTIAL HYPERTENSION: Status: ACTIVE | Noted: 2021-02-06

## 2021-02-06 PROCEDURE — 3008F BODY MASS INDEX DOCD: CPT | Performed by: NURSE PRACTITIONER

## 2021-02-06 PROCEDURE — 3078F DIAST BP <80 MM HG: CPT | Performed by: NURSE PRACTITIONER

## 2021-02-06 PROCEDURE — 3074F SYST BP LT 130 MM HG: CPT | Performed by: NURSE PRACTITIONER

## 2021-02-06 PROCEDURE — 99213 OFFICE O/P EST LOW 20 MIN: CPT | Performed by: NURSE PRACTITIONER

## 2021-02-06 NOTE — PROGRESS NOTES
HPI    Pt here for dm f/u     bs runs 110-120s    Tries to watch diet and exercises occasionally. 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 Review of Systems   Constitutional: Negative for activity change and appetite change. Not on file      Number of children: Not on file      Years of education: Not on file      Highest education level: Not on file    Occupational History      Not on file    Social Needs      Financial resource strain: Not on file      Food insecurity CHECK GLUCOSE ONCE DAILY 100 strip 3   • Blood Glucose Monitoring Suppl (FREESTYLE FREEDOM LITE) w/Device Does not apply Kit Check blood sugar once daily 1 kit 0   • Blood Glucose Calibration (FREESTYLE CONTROL SOLUTION) In Vitro Liquid Check blood sugar o questions answered. Pt to call with questions or concerns. Encouraged to sign up for My Chart if not already registered.

## 2021-02-24 RX ORDER — FINASTERIDE 5 MG/1
5 TABLET, FILM COATED ORAL DAILY
Qty: 90 TABLET | Refills: 0 | Status: SHIPPED | OUTPATIENT
Start: 2021-02-24 | End: 2021-05-19

## 2021-04-13 RX ORDER — ROSUVASTATIN CALCIUM 40 MG/1
40 TABLET, COATED ORAL NIGHTLY
Qty: 90 TABLET | Refills: 0 | Status: SHIPPED | OUTPATIENT
Start: 2021-04-13 | End: 2021-07-06

## 2021-04-22 RX ORDER — BLOOD-GLUCOSE METER
1 KIT MISCELLANEOUS DAILY
Qty: 100 STRIP | Refills: 3 | Status: SHIPPED | OUTPATIENT
Start: 2021-04-22

## 2021-04-22 RX ORDER — BLOOD-GLUCOSE METER
KIT MISCELLANEOUS
Qty: 100 STRIP | Refills: 3 | Status: SHIPPED | OUTPATIENT
Start: 2021-04-22 | End: 2021-04-22

## 2021-04-22 NOTE — TELEPHONE ENCOUNTER
[id number] - [place], [place] - [address] [phone], [phone]   Outpatient Medication Detail     Disp Refills Start End    Glucose Blood (FREESTYLE LITE TEST) In Vitro Strip 100 strip 3 4/22/2021     Sig: USE 1 STRIP TO CHECK GLUCOSE ONCE COMPA

## 2021-04-29 ENCOUNTER — LAB ENCOUNTER (OUTPATIENT)
Dept: LAB | Age: 61
End: 2021-04-29
Attending: INTERNAL MEDICINE
Payer: COMMERCIAL

## 2021-04-29 DIAGNOSIS — I25.10 CORONARY ATHEROSCLEROSIS OF NATIVE CORONARY ARTERY: Primary | ICD-10-CM

## 2021-04-29 PROCEDURE — 80048 BASIC METABOLIC PNL TOTAL CA: CPT

## 2021-04-29 PROCEDURE — 36415 COLL VENOUS BLD VENIPUNCTURE: CPT

## 2021-04-29 PROCEDURE — 80061 LIPID PANEL: CPT

## 2021-05-03 NOTE — TELEPHONE ENCOUNTER
[facility] [phone] is calling for status of the medication refill request. Rayray Coronado, states that the patient need both the metformin and lisinopril.  Pt is out of medication    Current Outpatient Medications   Medication Sig Dispense Refill   • metFORMIN HC

## 2021-05-11 ENCOUNTER — MED REC SCAN ONLY (OUTPATIENT)
Dept: FAMILY MEDICINE CLINIC | Facility: CLINIC | Age: 61
End: 2021-05-11

## 2021-05-11 RX ORDER — LISINOPRIL 40 MG/1
40 TABLET ORAL DAILY
Qty: 90 TABLET | Refills: 1 | Status: SHIPPED | OUTPATIENT
Start: 2021-05-11 | End: 2021-10-19

## 2021-05-14 RX ORDER — LISINOPRIL 40 MG/1
40 TABLET ORAL DAILY
Qty: 90 TABLET | Refills: 0 | OUTPATIENT
Start: 2021-05-14

## 2021-05-19 RX ORDER — FINASTERIDE 5 MG/1
5 TABLET, FILM COATED ORAL DAILY
Qty: 90 TABLET | Refills: 0 | Status: SHIPPED | OUTPATIENT
Start: 2021-05-19 | End: 2021-08-17

## 2021-05-24 NOTE — TELEPHONE ENCOUNTER
Spoke with Ziggy at [facility] not received 5/14/2021 Metformin Rx--reordered as per Baldemar Peraza on 5/14/2921. No further questions/concerns at this time.

## 2021-07-06 RX ORDER — ROSUVASTATIN CALCIUM 40 MG/1
40 TABLET, COATED ORAL NIGHTLY
Qty: 90 TABLET | Refills: 0 | Status: SHIPPED | OUTPATIENT
Start: 2021-07-06 | End: 2021-10-15

## 2021-08-09 RX ORDER — LANCETS 28 GAUGE
EACH MISCELLANEOUS
Qty: 100 EACH | Refills: 3 | Status: SHIPPED | OUTPATIENT
Start: 2021-08-09

## 2021-08-09 NOTE — TELEPHONE ENCOUNTER
Refill request on the following.     Current Outpatient Medications   Medication Sig Dispense Refill   •    0   •    0   •    0   •    1   •    3   • FREESTYLE LANCETS Does not apply Misc USE 1  TO CHECK GLUCOSE ONCE DAILY 100 each 3

## 2021-08-12 ENCOUNTER — TELEPHONE (OUTPATIENT)
Dept: FAMILY MEDICINE CLINIC | Facility: CLINIC | Age: 61
End: 2021-08-12

## 2021-08-12 NOTE — TELEPHONE ENCOUNTER
Current Outpatient Medications:   •  FreeStyle Lancets Does not apply Misc, USE 1  TO CHECK GLUCOSE ONCE DAILY, Disp: 100 each, Rfl: 3

## 2021-08-12 NOTE — TELEPHONE ENCOUNTER
Disp Refills Start End     FreeStyle Lancets Does not apply Misc 100 each 3 8/9/2021     Sig: USE 1  TO CHECK GLUCOSE ONCE DAILY    Sent to pharmacy as: FreeStyle Lancets    Notes to Pharmacy: Please dispense device that is approved under insurance plan.  E

## 2021-08-17 NOTE — TELEPHONE ENCOUNTER
Please review. Protocol failed or has no protocol. Requested Prescriptions   Pending Prescriptions Disp Refills    FINASTERIDE 5 MG Oral Tab [Pharmacy Med Name: Finasteride 5 Mg Tab Auro] 90 tablet 0     Sig: Take 1 tablet (5 mg total) by mouth daily        There is no refill protocol information for this order        METFORMIN HCL 1000 MG Oral Tab [Pharmacy Med Name: Metformin Hydrochloride 1,000 Mg Tab Gran] 180 tablet 0     Sig: Take 1 tablet (1,000 mg total) by mouth 2 (two) times daily with meals.         Diabetes Medication Protocol Failed - 8/17/2021  1:31 AM        Failed - Last A1C < 7.5 and within past 6 months     Lab Results   Component Value Date    A1C 6.8 (H) 01/28/2021             Failed - Appointment in past 6 or next 3 months        Passed - GFR Non- > 50     Lab Results   Component Value Date    GFRNAA 80 04/29/2021               Passed - GFR in the past 12 months            Recent Outpatient Visits              6 months ago Jose Burris APRN    Office Visit    1 year ago Jose Burris APRN    Office Visit    1 year ago Screening for colon cancer    [facility] [address], [facility] VIKTORIYA Yao    Office Visit    1 year ago Type 2 diabetes mellitus with other circulatory complication, without long-term current use of insulin [facility])    Azeb Stephen, [place] DO    Office Visit    2 years ago Annual physical exam    [facility], [facility], [place], [place]    Office Visit

## 2021-08-18 RX ORDER — FINASTERIDE 5 MG/1
5 TABLET, FILM COATED ORAL DAILY
Qty: 30 TABLET | Refills: 0 | Status: SHIPPED | OUTPATIENT
Start: 2021-08-18 | End: 2021-10-15

## 2021-08-26 ENCOUNTER — OFFICE VISIT (OUTPATIENT)
Dept: FAMILY MEDICINE CLINIC | Facility: CLINIC | Age: 61
End: 2021-08-26
Payer: COMMERCIAL

## 2021-08-26 VITALS
HEIGHT: 72 IN | HEART RATE: 78 BPM | SYSTOLIC BLOOD PRESSURE: 122 MMHG | WEIGHT: 208 LBS | BODY MASS INDEX: 28.17 KG/M2 | DIASTOLIC BLOOD PRESSURE: 79 MMHG

## 2021-08-26 DIAGNOSIS — S99.921A INJURY OF RIGHT FOOT, INITIAL ENCOUNTER: ICD-10-CM

## 2021-08-26 DIAGNOSIS — I10 ESSENTIAL HYPERTENSION: Primary | ICD-10-CM

## 2021-08-26 DIAGNOSIS — E78.00 HYPERCHOLESTEREMIA: ICD-10-CM

## 2021-08-26 DIAGNOSIS — E11.9 CONTROLLED TYPE 2 DIABETES MELLITUS WITHOUT COMPLICATION, WITHOUT LONG-TERM CURRENT USE OF INSULIN (HCC): ICD-10-CM

## 2021-08-26 PROCEDURE — 3074F SYST BP LT 130 MM HG: CPT | Performed by: NURSE PRACTITIONER

## 2021-08-26 PROCEDURE — 3078F DIAST BP <80 MM HG: CPT | Performed by: NURSE PRACTITIONER

## 2021-08-26 PROCEDURE — 3008F BODY MASS INDEX DOCD: CPT | Performed by: NURSE PRACTITIONER

## 2021-08-26 PROCEDURE — 99213 OFFICE O/P EST LOW 20 MIN: CPT | Performed by: NURSE PRACTITIONER

## 2021-08-26 NOTE — PROGRESS NOTES
HPI  Pt presents for 6 months follow up-doing well  Did have a minor accident at work last week when a cookie sheet fell on his right foot. Has a bump to the front of calf and brusing across all of his toes.   Pain is improving-has a little tingling in righ for empyema       Family History   Problem Relation Age of Onset   • Heart Disease Father 71        had [address] and [address] way bypass.    • Cancer Father         Prostate   • Heart Disease Paternal Aunt    • Heart Disease Mother    • Cancer Other         Prostate metFORMIN HCl 1000 MG Oral Tab Take 1 tablet (1,000 mg total) by mouth 2 (two) times daily with meals.  60 tablet 0   • FreeStyle Lancets Does not apply Misc USE 1  TO CHECK GLUCOSE ONCE DAILY 100 each 3   • ROSUVASTATIN CALCIUM 40 MG Oral Tab Take 1 tablet Endocrine    Controlled type 2 diabetes mellitus without complication, without long-term current use of insulin ([facility])     cpm  Screening labs ordered            Musculoskeletal    Right foot injury     Ice 20 min 4-6 times per day  Wear supportive shoes  D

## 2021-08-26 NOTE — ASSESSMENT & PLAN NOTE
Ice 20 min 4-6 times per day  Wear supportive shoes  Declines xrays  Please call if symptoms worsen or are not resolving.

## 2021-09-09 ENCOUNTER — LAB ENCOUNTER (OUTPATIENT)
Dept: LAB | Age: 61
End: 2021-09-09
Attending: NURSE PRACTITIONER
Payer: COMMERCIAL

## 2021-09-09 DIAGNOSIS — Z12.5 SCREENING PSA (PROSTATE SPECIFIC ANTIGEN): ICD-10-CM

## 2021-09-09 DIAGNOSIS — Z00.00 WELL ADULT EXAM: ICD-10-CM

## 2021-09-09 DIAGNOSIS — E11.9 CONTROLLED TYPE 2 DIABETES MELLITUS WITHOUT COMPLICATION, WITHOUT LONG-TERM CURRENT USE OF INSULIN (HCC): ICD-10-CM

## 2021-09-09 LAB
ALBUMIN SERPL-MCNC: 3.9 G/DL (ref 3.4–5)
ALBUMIN/GLOB SERPL: 1.2 {RATIO} (ref 1–2)
ALP LIVER SERPL-CCNC: 49 U/L
ALT SERPL-CCNC: 34 U/L
ANION GAP SERPL CALC-SCNC: 7 MMOL/L (ref 0–18)
AST SERPL-CCNC: 18 U/L (ref 15–37)
BILIRUB SERPL-MCNC: 0.6 MG/DL (ref 0.1–2)
BUN BLD-MCNC: 24 MG/DL (ref 7–18)
BUN/CREAT SERPL: 22.9 (ref 10–20)
CALCIUM BLD-MCNC: 9.5 MG/DL (ref 8.5–10.1)
CHLORIDE SERPL-SCNC: 108 MMOL/L (ref 98–112)
CHOLEST SMN-MCNC: 97 MG/DL (ref ?–200)
CO2 SERPL-SCNC: 26 MMOL/L (ref 21–32)
COMPLEXED PSA SERPL-MCNC: 1.58 NG/ML (ref ?–4)
CREAT BLD-MCNC: 1.05 MG/DL
CREAT UR-SCNC: 81.3 MG/DL
DEPRECATED RDW RBC AUTO: 41.1 FL (ref 35.1–46.3)
ERYTHROCYTE [DISTWIDTH] IN BLOOD BY AUTOMATED COUNT: 12.8 % (ref 11–15)
EST. AVERAGE GLUCOSE BLD GHB EST-MCNC: 148 MG/DL (ref 68–126)
GLOBULIN PLAS-MCNC: 3.2 G/DL (ref 2.8–4.4)
GLUCOSE BLD-MCNC: 130 MG/DL (ref 70–99)
HBA1C MFR BLD HPLC: 6.8 % (ref ?–5.7)
HCT VFR BLD AUTO: 41.2 %
HDLC SERPL-MCNC: 72 MG/DL (ref 40–59)
HGB BLD-MCNC: 13.7 G/DL
LDLC SERPL CALC-MCNC: 13 MG/DL (ref ?–100)
M PROTEIN MFR SERPL ELPH: 7.1 G/DL (ref 6.4–8.2)
MCH RBC QN AUTO: 29.1 PG (ref 26–34)
MCHC RBC AUTO-ENTMCNC: 33.3 G/DL (ref 31–37)
MCV RBC AUTO: 87.7 FL
MICROALBUMIN UR-MCNC: 0.61 MG/DL
MICROALBUMIN/CREAT 24H UR-RTO: 7.5 UG/MG (ref ?–30)
NONHDLC SERPL-MCNC: 25 MG/DL (ref ?–130)
OSMOLALITY SERPL CALC.SUM OF ELEC: 298 MOSM/KG (ref 275–295)
PATIENT FASTING Y/N/NP: YES
PATIENT FASTING Y/N/NP: YES
PLATELET # BLD AUTO: 233 10(3)UL (ref 150–450)
POTASSIUM SERPL-SCNC: 4.6 MMOL/L (ref 3.5–5.1)
RBC # BLD AUTO: 4.7 X10(6)UL
SODIUM SERPL-SCNC: 141 MMOL/L (ref 136–145)
TRIGL SERPL-MCNC: 45 MG/DL (ref 30–149)
TSI SER-ACNC: 0.82 MIU/ML (ref 0.36–3.74)
VIT B12 SERPL-MCNC: 323 PG/ML (ref 193–986)
VIT D+METAB SERPL-MCNC: 33.1 NG/ML (ref 30–100)
VLDLC SERPL CALC-MCNC: 6 MG/DL (ref 0–30)
WBC # BLD AUTO: 5.6 X10(3) UL (ref 4–11)

## 2021-09-09 PROCEDURE — 36415 COLL VENOUS BLD VENIPUNCTURE: CPT

## 2021-09-09 PROCEDURE — 83036 HEMOGLOBIN GLYCOSYLATED A1C: CPT

## 2021-09-09 PROCEDURE — 3061F NEG MICROALBUMINURIA REV: CPT | Performed by: NURSE PRACTITIONER

## 2021-09-09 PROCEDURE — 85027 COMPLETE CBC AUTOMATED: CPT

## 2021-09-09 PROCEDURE — 82607 VITAMIN B-12: CPT

## 2021-09-09 PROCEDURE — 3044F HG A1C LEVEL LT 7.0%: CPT | Performed by: NURSE PRACTITIONER

## 2021-09-09 PROCEDURE — 84443 ASSAY THYROID STIM HORMONE: CPT

## 2021-09-09 PROCEDURE — 82570 ASSAY OF URINE CREATININE: CPT

## 2021-09-09 PROCEDURE — 80061 LIPID PANEL: CPT

## 2021-09-09 PROCEDURE — 80053 COMPREHEN METABOLIC PANEL: CPT

## 2021-09-09 PROCEDURE — 82043 UR ALBUMIN QUANTITATIVE: CPT

## 2021-09-09 PROCEDURE — 82306 VITAMIN D 25 HYDROXY: CPT

## 2021-09-14 ENCOUNTER — TELEPHONE (OUTPATIENT)
Dept: FAMILY MEDICINE CLINIC | Facility: CLINIC | Age: 61
End: 2021-09-14

## 2021-09-14 NOTE — TELEPHONE ENCOUNTER
Per pharmacy, PA needed for the following medication:    •  empagliflozin (JARDIANCE) 10 MG Oral Tab, Take 1 tablet (10 mg total) by mouth daily. , Disp: 90 tablet, Rfl: 1    Key: [id number]  Last name: Luis Huston  : 1960

## 2021-09-14 NOTE — TELEPHONE ENCOUNTER
Prior authorization for jardiance has been initiated through [employer] using keycode: [id number] It takes about 1-5 business days for a decision to come back.

## 2021-09-16 ENCOUNTER — OFFICE VISIT (OUTPATIENT)
Dept: FAMILY MEDICINE CLINIC | Facility: CLINIC | Age: 61
End: 2021-09-16
Payer: COMMERCIAL

## 2021-09-16 VITALS
HEART RATE: 80 BPM | SYSTOLIC BLOOD PRESSURE: 133 MMHG | BODY MASS INDEX: 28.44 KG/M2 | WEIGHT: 210 LBS | HEIGHT: 72 IN | DIASTOLIC BLOOD PRESSURE: 80 MMHG

## 2021-09-16 DIAGNOSIS — Z00.00 WELL ADULT EXAM: ICD-10-CM

## 2021-09-16 DIAGNOSIS — Z12.5 PROSTATE CANCER SCREENING: Primary | ICD-10-CM

## 2021-09-16 DIAGNOSIS — Z23 IMMUNIZATION DUE: ICD-10-CM

## 2021-09-16 DIAGNOSIS — Z87.438 HISTORY OF BPH: ICD-10-CM

## 2021-09-16 DIAGNOSIS — I10 ESSENTIAL HYPERTENSION: ICD-10-CM

## 2021-09-16 DIAGNOSIS — E78.00 HYPERCHOLESTEREMIA: ICD-10-CM

## 2021-09-16 DIAGNOSIS — E11.9 CONTROLLED TYPE 2 DIABETES MELLITUS WITHOUT COMPLICATION, WITHOUT LONG-TERM CURRENT USE OF INSULIN (HCC): ICD-10-CM

## 2021-09-16 PROCEDURE — 99396 PREV VISIT EST AGE 40-64: CPT | Performed by: NURSE PRACTITIONER

## 2021-09-16 PROCEDURE — 90750 HZV VACC RECOMBINANT IM: CPT | Performed by: NURSE PRACTITIONER

## 2021-09-16 PROCEDURE — 3079F DIAST BP 80-89 MM HG: CPT | Performed by: NURSE PRACTITIONER

## 2021-09-16 PROCEDURE — 3075F SYST BP GE 130 - 139MM HG: CPT | Performed by: NURSE PRACTITIONER

## 2021-09-16 PROCEDURE — 90471 IMMUNIZATION ADMIN: CPT | Performed by: NURSE PRACTITIONER

## 2021-09-16 PROCEDURE — 3008F BODY MASS INDEX DOCD: CPT | Performed by: NURSE PRACTITIONER

## 2021-09-16 NOTE — PROGRESS NOTES
HPI  Pt presents for annual physical. Labs completed 9/9/21    Overall feeling ok. Taking medications as prescribed ex for Jardiance.  Has had farziga in the past and developed kidney stones-has not started jardiance-wanted to talk to me first.   Has ha • Type II or unspecified type diabetes mellitus without mention of complication, not stated as uncontrolled    • Unspecified essential hypertension        .   Past Surgical History:   Procedure Laterality Date   • Appendectomy     • Colonoscopy     • Peterson and Family: Not on file      Frequency of Social Gatherings with Friends and Family: Not on file      Attends Scientologist Services: Not on file      Active Member of Clubs or Organizations: Not on file      Attends Club or Organization Meetings: Not on file acute distress. Appearance: He is well-developed. HENT:      Head: Normocephalic and atraumatic.       Right Ear: Tympanic membrane and ear canal normal.      Left Ear: Tympanic membrane and ear canal normal.      Mouth/Throat:      Mouth: Mucous memb 10.0 - 20.0  22.9 (H)   CALCIUM      8.5 - 10.1 mg/dL  9.5   CALCULATED OSMOLALITY      275 - 295 mOsm/kg  298 (H)   eGFR NON-AFR.  AMERICAN      >=60  76   eGFR       >=60  88   ALT (SGPT)      16 - 61 U/L  34   AST (SGOT)      15 - 37 U Other    Immunization due    Relevant Orders    ZOSTER VACC RECOMBINANT IM NJX (Completed)    Prostate cancer screening - Primary    Relevant Orders    PSA SCREEN    History of BPH     Continue finasteride  Check psa in 6 months due to increase

## 2021-09-19 PROBLEM — S99.921A RIGHT FOOT INJURY: Status: RESOLVED | Noted: 2021-08-26 | Resolved: 2021-09-19

## 2021-09-19 NOTE — ASSESSMENT & PLAN NOTE
Hold jardiance due to h/o kidney stones  Continue low carb diet and exercise  Has eye doctor appointment later this fall  cpm

## 2021-09-19 NOTE — ASSESSMENT & PLAN NOTE
Reviewed labs  Please aim to eat a diet high in fresh fruits and vegetables, lean protein sources, complex carbohydrates and limited processed and fast foods.   Try to get at least 150 minutes of exercise per week-a combination of weight resistance and card

## 2021-10-11 NOTE — TELEPHONE ENCOUNTER
Refill passed per [facility] protocol. Requested Prescriptions   Pending Prescriptions Disp Refills    METFORMIN HCL 1000 MG Oral Tab [Pharmacy Med Name: Metformin Hydrochloride 1,000 Mg Tab Gran] 60 tablet 0     Sig: Take 1 tablet (1,000 mg total) by mouth 2 (two) times daily with meals.         Diabetes Medication Protocol Passed - 10/11/2021  3:10 PM        Passed - Last A1C < 7.5 and within past 6 months     Lab Results   Component Value Date    A1C 6.8 (H) 09/09/2021               Passed - Appointment in past 6 or next 3 months        Passed - GFR Non- > 50     Lab Results   Component Value Date    GFRNAA 76 09/09/2021                 Passed - GFR in the past 12 months               Recent Outpatient Visits              3 weeks ago [facility]    [facility], [address], [address] [place] [address], APRN    Office Visit    1 month ago Essential hypertension    [facility], [facility], [address], [address] [place] [address], APRN    Office Visit    8 months ago [facility], [address], [address] [place] [address], APRN    Office Visit    1 year ago [facility], [address], [address] [place] [address], APRN    Office Visit    1 year ago [place]ening for [place] cancer    [place] [facility], [facility], [place], Claudia Henderson, APRN    Office Visit

## 2021-10-15 RX ORDER — FINASTERIDE 5 MG/1
5 TABLET, FILM COATED ORAL DAILY
Qty: 30 TABLET | Refills: 0 | Status: SHIPPED | OUTPATIENT
Start: 2021-10-15 | End: 2021-11-09

## 2021-10-15 RX ORDER — ROSUVASTATIN CALCIUM 40 MG/1
40 TABLET, COATED ORAL NIGHTLY
Qty: 90 TABLET | Refills: 1 | Status: SHIPPED | OUTPATIENT
Start: 2021-10-15

## 2021-10-15 NOTE — TELEPHONE ENCOUNTER
Refill passed per [facility], [facility] protocol. Requested Prescriptions   Pending Prescriptions Disp Refills    FINASTERIDE 5 MG Oral Tab [Pharmacy Med Name: Finasteride 5 Mg Tab Auro] 30 tablet 0     Sig: Take 1 tablet (5 mg total) by mouth daily.         Piedad James

## 2021-10-15 NOTE — TELEPHONE ENCOUNTER
Please review; protocol failed. Requested Prescriptions   Pending Prescriptions Disp Refills    finasteride 5 MG Oral Tab [Pharmacy Med Name: Finasteride 5 Mg Tab Auro] 30 tablet 0     Sig: Take 1 tablet (5 mg total) by mouth daily.         There is no r

## 2021-10-19 RX ORDER — LISINOPRIL 40 MG/1
40 TABLET ORAL DAILY
Qty: 90 TABLET | Refills: 1 | Status: SHIPPED | OUTPATIENT
Start: 2021-10-19

## 2021-10-19 NOTE — TELEPHONE ENCOUNTER
Refilled per [address] [place] protocol. Requested Prescriptions   Pending Prescriptions Disp Refills    LISINOPRIL 40 MG Oral Tab [Pharmacy Med Name: Lisinopril 40 Mg Tab Sol] 90 tablet 0     Sig: Take 1 tablet (40 mg total) by mouth daily.         Hyperte

## 2021-11-09 RX ORDER — FINASTERIDE 5 MG/1
5 TABLET, FILM COATED ORAL DAILY
Qty: 90 TABLET | Refills: 1 | Status: SHIPPED | OUTPATIENT
Start: 2021-11-09

## 2021-11-09 NOTE — TELEPHONE ENCOUNTER
Please review refill protocol failed/ no protocol  Requested Prescriptions   Pending Prescriptions Disp Refills    FINASTERIDE 5 MG Oral Tab [Pharmacy Med Name: Finasteride 5 Mg Tab Auro] 30 tablet 0     Sig: Take 1 tablet (5 mg total) by mouth daily.

## 2022-02-09 ENCOUNTER — TELEPHONE (OUTPATIENT)
Dept: FAMILY MEDICINE CLINIC | Facility: CLINIC | Age: 62
End: 2022-02-09

## 2022-02-09 NOTE — TELEPHONE ENCOUNTER
Pt has full physical in Sept-he does not need to repeat all of his labs-just the ones that are abnormal

## 2022-02-09 NOTE — TELEPHONE ENCOUNTER
Dr. Don Neville, Patient is requesting additional lab orders to be added.  Current orders are CMP, Hemoglobin A1c, PSA.

## 2022-02-09 NOTE — TELEPHONE ENCOUNTER
Patient requesting additional labs to be drawn prior to appointment. Patient would like to go in tomorrow 2/10 for labs. Requesting a call back regarding reason for appointment and once blood work is ordered.

## 2022-02-10 ENCOUNTER — LAB ENCOUNTER (OUTPATIENT)
Dept: LAB | Age: 62
End: 2022-02-10
Attending: NURSE PRACTITIONER
Payer: COMMERCIAL

## 2022-02-10 DIAGNOSIS — Z12.5 PROSTATE CANCER SCREENING: ICD-10-CM

## 2022-02-10 DIAGNOSIS — E11.9 CONTROLLED TYPE 2 DIABETES MELLITUS WITHOUT COMPLICATION, WITHOUT LONG-TERM CURRENT USE OF INSULIN (HCC): ICD-10-CM

## 2022-02-10 LAB
ALBUMIN SERPL-MCNC: 3.7 G/DL (ref 3.4–5)
ALBUMIN/GLOB SERPL: 1.2 {RATIO} (ref 1–2)
ALP LIVER SERPL-CCNC: 60 U/L
ALT SERPL-CCNC: 31 U/L
ANION GAP SERPL CALC-SCNC: 8 MMOL/L (ref 0–18)
AST SERPL-CCNC: 10 U/L (ref 15–37)
BILIRUB SERPL-MCNC: 0.4 MG/DL (ref 0.1–2)
BUN BLD-MCNC: 21 MG/DL (ref 7–18)
BUN/CREAT SERPL: 20.4 (ref 10–20)
CALCIUM BLD-MCNC: 9.5 MG/DL (ref 8.5–10.1)
CHLORIDE SERPL-SCNC: 110 MMOL/L (ref 98–112)
CO2 SERPL-SCNC: 25 MMOL/L (ref 21–32)
CREAT BLD-MCNC: 1.03 MG/DL
EST. AVERAGE GLUCOSE BLD GHB EST-MCNC: 148 MG/DL (ref 68–126)
FASTING STATUS PATIENT QL REPORTED: NO
GLOBULIN PLAS-MCNC: 3.2 G/DL (ref 2.8–4.4)
GLUCOSE BLD-MCNC: 135 MG/DL (ref 70–99)
HBA1C MFR BLD: 6.8 % (ref ?–5.7)
OSMOLALITY SERPL CALC.SUM OF ELEC: 301 MOSM/KG (ref 275–295)
POTASSIUM SERPL-SCNC: 4.5 MMOL/L (ref 3.5–5.1)
PROT SERPL-MCNC: 6.9 G/DL (ref 6.4–8.2)
SODIUM SERPL-SCNC: 143 MMOL/L (ref 136–145)

## 2022-02-10 PROCEDURE — 83036 HEMOGLOBIN GLYCOSYLATED A1C: CPT

## 2022-02-10 PROCEDURE — 3044F HG A1C LEVEL LT 7.0%: CPT | Performed by: NURSE PRACTITIONER

## 2022-02-10 PROCEDURE — 36415 COLL VENOUS BLD VENIPUNCTURE: CPT

## 2022-02-10 PROCEDURE — 80053 COMPREHEN METABOLIC PANEL: CPT

## 2022-02-17 ENCOUNTER — OFFICE VISIT (OUTPATIENT)
Dept: FAMILY MEDICINE CLINIC | Facility: CLINIC | Age: 62
End: 2022-02-17
Payer: COMMERCIAL

## 2022-02-17 VITALS
WEIGHT: 205 LBS | HEIGHT: 72 IN | SYSTOLIC BLOOD PRESSURE: 132 MMHG | DIASTOLIC BLOOD PRESSURE: 80 MMHG | HEART RATE: 58 BPM | BODY MASS INDEX: 27.77 KG/M2

## 2022-02-17 DIAGNOSIS — I10 ESSENTIAL HYPERTENSION: Primary | ICD-10-CM

## 2022-02-17 DIAGNOSIS — E11.9 CONTROLLED TYPE 2 DIABETES MELLITUS WITHOUT COMPLICATION, WITHOUT LONG-TERM CURRENT USE OF INSULIN (HCC): ICD-10-CM

## 2022-02-17 DIAGNOSIS — R25.2 LEG CRAMPS: ICD-10-CM

## 2022-02-17 PROCEDURE — 99214 OFFICE O/P EST MOD 30 MIN: CPT | Performed by: NURSE PRACTITIONER

## 2022-02-17 PROCEDURE — 3079F DIAST BP 80-89 MM HG: CPT | Performed by: NURSE PRACTITIONER

## 2022-02-17 PROCEDURE — 3075F SYST BP GE 130 - 139MM HG: CPT | Performed by: NURSE PRACTITIONER

## 2022-02-17 PROCEDURE — 3008F BODY MASS INDEX DOCD: CPT | Performed by: NURSE PRACTITIONER

## 2022-02-17 RX ORDER — AMLODIPINE BESYLATE 5 MG/1
5 TABLET ORAL DAILY
COMMUNITY
Start: 2022-02-01 | End: 2022-03-02

## 2022-02-17 NOTE — ASSESSMENT & PLAN NOTE
Encourage to dry stretching exercises before bed  Encourage wearing of gym shoes while at home instead of flat footedness  May try tonic water 1-2 glasses in evening  Encourage healthy hydration    Please call if symptoms worsen or are not resolving.

## 2022-02-17 NOTE — ASSESSMENT & PLAN NOTE
It is essential that you decrease the amount of carbohydrates that you ingest (bread products, rice, pasta, potatoes, starchy vegetables and sugary beverages). Also try to increase the amount of vegetables and protein that you eat daily. Decrease the amount of processed and fast foods. Try to exercise at least 30 minutes per day, 4-5 times per week. cpm    Recheck labs in 6 months.

## 2022-03-02 NOTE — TELEPHONE ENCOUNTER
Refill passed per [address] protocol, but listed as external/patient reported. Send if appropriate. Requested Prescriptions   Pending Prescriptions Disp Refills    amLODIPine 5 MG Oral Tab 90 tablet 0     Sig: Take 1 tablet (5 mg total) by mouth daily.         Hypertensive Medications Protocol Passed - 3/2/2022  2:53 PM        Passed - CMP or BMP in past 12 months        Passed - Appointment in past 6 or next 3 months        Passed - GFR Non- > 50     Lab Results   Component Value Date    GFRNAA 77 02/10/2022                      Recent Outpatient Visits              1 week ago Essential hypertension    [address], [address] [facility], APRN    Office Visit    5 months ago Prostate cancer screening    [address], [address] [facility], APRN    Office Visit    6 months ago Essential hypertension    [address], [address] [facility], APRN    Office Visit    1 year ago [place], [address] [facility], APRN    Office Visit    1 year ago [place], [address] [facility], APRN    Office Visit

## 2022-03-03 RX ORDER — AMLODIPINE BESYLATE 5 MG/1
5 TABLET ORAL DAILY
Qty: 90 TABLET | Refills: 1 | Status: SHIPPED | OUTPATIENT
Start: 2022-03-03

## 2022-04-04 RX ORDER — ROSUVASTATIN CALCIUM 40 MG/1
40 TABLET, COATED ORAL NIGHTLY
Qty: 90 TABLET | Refills: 1 | Status: SHIPPED | OUTPATIENT
Start: 2022-04-04

## 2022-04-04 NOTE — TELEPHONE ENCOUNTER
Refill passed per [facility] [facility] protocol. Requested Prescriptions   Pending Prescriptions Disp Refills    ROSUVASTATIN 40 MG Oral Tab [Pharmacy Med Name: Rosuvastatin Calcium 40 Mg Tab Auro] 90 tablet 0     Sig: Take 1 tablet (40 mg total) by mouth nightly. Cholesterol Medication Protocol Passed - 4/4/2022  1:31 AM        Passed - ALT in past 12 months        Passed - LDL in past 12 months        Passed - Last ALT < 80       Lab Results   Component Value Date    ALT 31 02/10/2022             Passed - Last LDL < 130     Lab Results   Component Value Date    LDL 13 09/09/2021               Passed - Appointment in past 12 or next 3 months           METFORMIN HCL 1000 MG Oral Tab [Pharmacy Med Name: Metformin Hydrochloride 1,000 Mg Tab Gran] 180 tablet 0     Sig: Take 1 tablet (1,000 mg total) by mouth 2 (two) times daily with meals.         Diabetes Medication Protocol Passed - 4/4/2022  1:31 AM        Passed - Last A1C < 7.5 and within past 6 months     Lab Results   Component Value Date    A1C 6.8 (H) 02/10/2022               Passed - Appointment in past 6 or next 3 months        Passed - GFR Non- > 50     Lab Results   Component Value Date    GFRNAA 77 02/10/2022                 Passed - GFR in the past 12 months            Recent Outpatient Visits              1 month ago Essential hypertension    [place] [employer] [facility], [address], [address] [facility], APRN    Office Visit    6 months ago [facility]state cancer screening    [facility], [address], [address] [facility], APRN    Office Visit    7 months ago Essential hypertension    [facility], [address], [address] [facility], APRN    Office Visit    1 year ago [place], [address] [facility], APRN    Office Visit    1 year ago [place], [address] [facility], APRN    Office Visit

## 2022-04-29 RX ORDER — FINASTERIDE 5 MG/1
5 TABLET, FILM COATED ORAL DAILY
Qty: 90 TABLET | Refills: 1 | Status: SHIPPED | OUTPATIENT
Start: 2022-04-29

## 2022-05-01 RX ORDER — LISINOPRIL 40 MG/1
40 TABLET ORAL DAILY
Qty: 90 TABLET | Refills: 1 | Status: SHIPPED | OUTPATIENT
Start: 2022-05-01 | End: 2022-10-19

## 2022-05-01 NOTE — TELEPHONE ENCOUNTER
Refill passed per [facility] protocol. Requested Prescriptions   Pending Prescriptions Disp Refills    LISINOPRIL 40 MG Oral Tab [Pharmacy Med Name: Lisinopril 40 Mg Tab Solc] 90 tablet 0     Sig: Take 1 tablet (40 mg total) by mouth daily.         Hypertensive Medications Protocol Passed - 5/1/2022  3:13 PM        Passed - CMP or BMP in past 12 months        Passed - Appointment in past 6 or next 3 months        Passed - GFR Non- > 50     Lab Results   Component Value Date    GFRNAA 77 02/10/2022                       Recent Outpatient Visits              2 months ago Essential hypertension    [place] [facility], [place], [address], [address] [place] [address], APRN    Office Visit    7 months ago Prostate cancer screening    [facility], [place], [address], [address] [place] [address], APRN    Office Visit    8 months ago Essential hypertension    [place] [facility] [facility] [place], [place], [address], [facility], APRN    Office Visit    1 year ago [facility], [address] [place], [facility], APRN    Office Visit    1 year ago [facility], [address], [facility], APRN    Office Visit

## 2022-05-06 ENCOUNTER — MED REC SCAN ONLY (OUTPATIENT)
Dept: FAMILY MEDICINE CLINIC | Facility: CLINIC | Age: 62
End: 2022-05-06

## 2022-09-29 ENCOUNTER — TELEPHONE (OUTPATIENT)
Dept: FAMILY MEDICINE CLINIC | Facility: CLINIC | Age: 62
End: 2022-09-29

## 2022-09-29 DIAGNOSIS — Z12.5 PROSTATE CANCER SCREENING: ICD-10-CM

## 2022-09-29 DIAGNOSIS — Z00.00 WELL ADULT EXAM: Primary | ICD-10-CM

## 2022-10-04 ENCOUNTER — LAB ENCOUNTER (OUTPATIENT)
Dept: LAB | Age: 62
End: 2022-10-04
Attending: NURSE PRACTITIONER
Payer: COMMERCIAL

## 2022-10-04 DIAGNOSIS — Z12.5 PROSTATE CANCER SCREENING: ICD-10-CM

## 2022-10-04 DIAGNOSIS — Z00.00 WELL ADULT EXAM: ICD-10-CM

## 2022-10-04 LAB
ALBUMIN SERPL-MCNC: 4 G/DL (ref 3.4–5)
ALBUMIN/GLOB SERPL: 1.3 {RATIO} (ref 1–2)
ALP LIVER SERPL-CCNC: 48 U/L
ALT SERPL-CCNC: 31 U/L
ANION GAP SERPL CALC-SCNC: 5 MMOL/L (ref 0–18)
AST SERPL-CCNC: 19 U/L (ref 15–37)
BILIRUB SERPL-MCNC: 0.5 MG/DL (ref 0.1–2)
BUN BLD-MCNC: 17 MG/DL (ref 7–18)
BUN/CREAT SERPL: 14.3 (ref 10–20)
CALCIUM BLD-MCNC: 10.2 MG/DL (ref 8.5–10.1)
CHLORIDE SERPL-SCNC: 113 MMOL/L (ref 98–112)
CHOLEST SERPL-MCNC: 106 MG/DL (ref ?–200)
CO2 SERPL-SCNC: 26 MMOL/L (ref 21–32)
COMPLEXED PSA SERPL-MCNC: 2.44 NG/ML (ref ?–4)
CREAT BLD-MCNC: 1.19 MG/DL
DEPRECATED RDW RBC AUTO: 38.7 FL (ref 35.1–46.3)
ERYTHROCYTE [DISTWIDTH] IN BLOOD BY AUTOMATED COUNT: 12.3 % (ref 11–15)
EST. AVERAGE GLUCOSE BLD GHB EST-MCNC: 140 MG/DL (ref 68–126)
FASTING PATIENT LIPID ANSWER: YES
FASTING STATUS PATIENT QL REPORTED: YES
GFR SERPLBLD BASED ON 1.73 SQ M-ARVRAT: 69 ML/MIN/1.73M2 (ref 60–?)
GLOBULIN PLAS-MCNC: 3.1 G/DL (ref 2.8–4.4)
GLUCOSE BLD-MCNC: 102 MG/DL (ref 70–99)
HBA1C MFR BLD: 6.5 % (ref ?–5.7)
HCT VFR BLD AUTO: 41 %
HDLC SERPL-MCNC: 77 MG/DL (ref 40–59)
HGB BLD-MCNC: 14.2 G/DL
LDLC SERPL CALC-MCNC: 17 MG/DL (ref ?–100)
MCH RBC QN AUTO: 30 PG (ref 26–34)
MCHC RBC AUTO-ENTMCNC: 34.6 G/DL (ref 31–37)
MCV RBC AUTO: 86.7 FL
NONHDLC SERPL-MCNC: 29 MG/DL (ref ?–130)
OSMOLALITY SERPL CALC.SUM OF ELEC: 300 MOSM/KG (ref 275–295)
PLATELET # BLD AUTO: 244 10(3)UL (ref 150–450)
POTASSIUM SERPL-SCNC: 4.4 MMOL/L (ref 3.5–5.1)
PROT SERPL-MCNC: 7.1 G/DL (ref 6.4–8.2)
RBC # BLD AUTO: 4.73 X10(6)UL
SODIUM SERPL-SCNC: 144 MMOL/L (ref 136–145)
TRIGL SERPL-MCNC: 49 MG/DL (ref 30–149)
TSI SER-ACNC: 0.86 MIU/ML (ref 0.36–3.74)
VIT B12 SERPL-MCNC: 321 PG/ML (ref 193–986)
VIT D+METAB SERPL-MCNC: 35.7 NG/ML (ref 30–100)
VLDLC SERPL CALC-MCNC: 6 MG/DL (ref 0–30)
WBC # BLD AUTO: 8.4 X10(3) UL (ref 4–11)

## 2022-10-04 PROCEDURE — 36415 COLL VENOUS BLD VENIPUNCTURE: CPT

## 2022-10-04 PROCEDURE — 80053 COMPREHEN METABOLIC PANEL: CPT

## 2022-10-04 PROCEDURE — 82306 VITAMIN D 25 HYDROXY: CPT | Performed by: NURSE PRACTITIONER

## 2022-10-04 PROCEDURE — 82607 VITAMIN B-12: CPT

## 2022-10-04 PROCEDURE — 83036 HEMOGLOBIN GLYCOSYLATED A1C: CPT

## 2022-10-04 PROCEDURE — 80061 LIPID PANEL: CPT

## 2022-10-04 PROCEDURE — 85027 COMPLETE CBC AUTOMATED: CPT

## 2022-10-04 PROCEDURE — 84443 ASSAY THYROID STIM HORMONE: CPT

## 2022-10-04 PROCEDURE — 3044F HG A1C LEVEL LT 7.0%: CPT | Performed by: NURSE PRACTITIONER

## 2022-10-06 ENCOUNTER — OFFICE VISIT (OUTPATIENT)
Dept: FAMILY MEDICINE CLINIC | Facility: CLINIC | Age: 62
End: 2022-10-06
Payer: COMMERCIAL

## 2022-10-06 VITALS
DIASTOLIC BLOOD PRESSURE: 80 MMHG | HEART RATE: 72 BPM | HEIGHT: 72 IN | BODY MASS INDEX: 27.77 KG/M2 | SYSTOLIC BLOOD PRESSURE: 135 MMHG | WEIGHT: 205 LBS

## 2022-10-06 DIAGNOSIS — Z12.5 PROSTATE CANCER SCREENING: ICD-10-CM

## 2022-10-06 DIAGNOSIS — I10 ESSENTIAL HYPERTENSION: ICD-10-CM

## 2022-10-06 DIAGNOSIS — E11.9 CONTROLLED TYPE 2 DIABETES MELLITUS WITHOUT COMPLICATION, WITHOUT LONG-TERM CURRENT USE OF INSULIN (HCC): ICD-10-CM

## 2022-10-06 DIAGNOSIS — Z00.00 WELL ADULT EXAM: ICD-10-CM

## 2022-10-06 DIAGNOSIS — E78.00 HYPERCHOLESTEREMIA: Primary | ICD-10-CM

## 2022-10-06 PROCEDURE — 3079F DIAST BP 80-89 MM HG: CPT | Performed by: NURSE PRACTITIONER

## 2022-10-06 PROCEDURE — 99213 OFFICE O/P EST LOW 20 MIN: CPT | Performed by: NURSE PRACTITIONER

## 2022-10-06 PROCEDURE — 3008F BODY MASS INDEX DOCD: CPT | Performed by: NURSE PRACTITIONER

## 2022-10-06 PROCEDURE — 3075F SYST BP GE 130 - 139MM HG: CPT | Performed by: NURSE PRACTITIONER

## 2022-10-06 RX ORDER — ROSUVASTATIN CALCIUM 40 MG/1
40 TABLET, COATED ORAL NIGHTLY
Qty: 90 TABLET | Refills: 1 | Status: SHIPPED | OUTPATIENT
Start: 2022-10-06

## 2022-10-06 NOTE — TELEPHONE ENCOUNTER
Refill passed per [facility] protocol. Requested Prescriptions   Pending Prescriptions Disp Refills    METFORMIN HCL 1000 MG Oral Tab [Pharmacy Med Name: Metformin Hydrochloride 1,000 Mg Tab Auro] 180 tablet 0     Sig: Take 1 tablet (1,000 mg total) by mouth 2 (two) times daily with meals. Diabetes Medication Protocol Passed - 10/6/2022  1:31 AM        Passed - Last A1C < 7.5 and within past 6 months     Lab Results   Component Value Date    A1C 6.5 (H) 10/04/2022               Passed - In person appointment or virtual visit in the past 6 mos or appointment in next 3 mos       Recent Outpatient Visits              Today [facility], [facility] [place], Diantha Lints, APRN    Office Visit    7 months ago Essential hypertension    [facility], [facility], [address], Diantha Lints, APRN    Office Visit    1 year ago Prostate cancer screening    [facility], [facility], [address], Diantha Lints, APRN    Office Visit    1 year ago Essential hypertension    [facility], [facility], [address], Diantha Lints, APRN    Office Visit    1 year ago [facility], [facility], [facility], APR    Office Visit                 Passed - EGFRCR or GFRNAA > 50     GFR Evaluation  EGFRCR: 69 , resulted on 10/4/2022            Passed - GFR in the past 12 months           ROSUVASTATIN 40 MG Oral Tab [Pharmacy Med Name: Rosuvastatin Calcium 40 Mg Tab Nort] 90 tablet 0     Sig: Take 1 tablet (40 mg total) by mouth nightly.         Cholesterol Medication Protocol Passed - 10/6/2022  1:31 AM        Passed - ALT in past 12 months        Passed - LDL in past 12 months        Passed - Last ALT < 80       Lab Results   Component Value Date    ALT 31 10/04/2022             Passed - Last LDL < 130     Lab Results   Component Value Date    LDL 17 10/04/2022               Passed - In person appointment or virtual visit in the past 12 mos or appointment in next 3 mos       Recent Outpatient Visits              Today Merlin Burris, APRN    Office Visit    7 months ago Essential hypertension    [address] [place], [address], [address] [place], Rosalie Raciel, APRN    Office Visit    1 year ago Prostate cancer screening    [address] [place] [address], [address], [address], Rosalie Raciel, APRN    Office Visit    1 year ago Essential hypertension    [address] [place] [address], [address], [address] [place], Rosalie Raciel, APRN    Office Visit    1 year ago [facility], [address], [address], Rosalie Raciel, APRN    Office Visit                     Recent Outpatient Visits              Today Merlin Burris, APRN    Office Visit    7 months ago Essential hypertension    [address] [place], [address], [address], Rosalie Raciel, APRN    Office Visit    1 year ago Prostate cancer screening    [address] [place] [address], [address], [address], Rosalie Raciel, APRN    Office Visit    1 year ago Essential hypertension    [address] [place], [address], [address], Rosalie Raciel, APRN    Office Visit    1 year ago [place], [address], Rosalie Raciel, APRN    Office Visit

## 2022-10-06 NOTE — ASSESSMENT & PLAN NOTE
hga1c down from 6.8 to 6.5  Continue healthy lifestyle  Continue present management  Encourage ophthalmologist to send notes to our office

## 2022-10-14 ENCOUNTER — TELEPHONE (OUTPATIENT)
Dept: FAMILY MEDICINE CLINIC | Facility: CLINIC | Age: 62
End: 2022-10-14

## 2022-10-14 ENCOUNTER — HOSPITAL ENCOUNTER (OUTPATIENT)
Age: 62
Discharge: HOME | End: 2022-10-14
Payer: COMMERCIAL

## 2022-10-14 VITALS
HEART RATE: 73 BPM | TEMPERATURE: 97 F | WEIGHT: 205 LBS | SYSTOLIC BLOOD PRESSURE: 130 MMHG | DIASTOLIC BLOOD PRESSURE: 79 MMHG | RESPIRATION RATE: 18 BRPM | HEIGHT: 72 IN | BODY MASS INDEX: 27.77 KG/M2 | OXYGEN SATURATION: 98 %

## 2022-10-14 DIAGNOSIS — R31.9 HEMATURIA, UNSPECIFIED TYPE: Primary | ICD-10-CM

## 2022-10-14 LAB
BILIRUB UR QL STRIP: NEGATIVE
CLARITY UR: CLEAR
COLOR UR: YELLOW
GLUCOSE UR STRIP-MCNC: NEGATIVE MG/DL
LEUKOCYTE ESTERASE UR QL STRIP: NEGATIVE
NITRITE UR QL STRIP: NEGATIVE
PH UR STRIP: 5.5 [PH]
PROT UR STRIP-MCNC: 30 MG/DL
SP GR UR STRIP: 1.02
UROBILINOGEN UR STRIP-ACNC: <2 MG/DL

## 2022-10-14 NOTE — TELEPHONE ENCOUNTER
Incoming call from Dalton, friend of patient. She called on his behalf as he is at work and c/o of pinkish tinge in urine. Patient will need evaluation. No appts available today. Can go to IC as soon as patient can get out of work. Dalton will inform patient.

## 2022-10-17 ENCOUNTER — TELEPHONE (OUTPATIENT)
Dept: FAMILY MEDICINE CLINIC | Facility: CLINIC | Age: 62
End: 2022-10-17

## 2022-10-17 DIAGNOSIS — R31.0 GROSS HEMATURIA: Primary | ICD-10-CM

## 2022-10-19 RX ORDER — LISINOPRIL 40 MG/1
40 TABLET ORAL DAILY
Qty: 90 TABLET | Refills: 1 | Status: SHIPPED | OUTPATIENT
Start: 2022-10-19

## 2022-10-20 ENCOUNTER — LAB ENCOUNTER (OUTPATIENT)
Dept: LAB | Age: 62
End: 2022-10-20
Attending: NURSE PRACTITIONER
Payer: COMMERCIAL

## 2022-10-20 LAB
BILIRUB UR QL: NEGATIVE
CLARITY UR: CLEAR
COLOR UR: YELLOW
GLUCOSE UR-MCNC: NEGATIVE MG/DL
KETONES UR-MCNC: NEGATIVE MG/DL
LEUKOCYTE ESTERASE UR QL STRIP.AUTO: NEGATIVE
NITRITE UR QL STRIP.AUTO: NEGATIVE
PH UR: 6 [PH] (ref 5–8)
PROT UR-MCNC: NEGATIVE MG/DL
RBC #/AREA URNS AUTO: >10 /HPF
RBC #/AREA URNS AUTO: >10 /HPF
SP GR UR STRIP: 1.01 (ref 1–1.03)
UROBILINOGEN UR STRIP-ACNC: 0.2

## 2022-10-20 PROCEDURE — 81015 MICROSCOPIC EXAM OF URINE: CPT | Performed by: NURSE PRACTITIONER

## 2022-10-20 PROCEDURE — 81001 URINALYSIS AUTO W/SCOPE: CPT | Performed by: NURSE PRACTITIONER

## 2022-10-23 DIAGNOSIS — R31.0 GROSS HEMATURIA: Primary | ICD-10-CM

## 2022-11-01 RX ORDER — BLOOD-GLUCOSE METER
KIT MISCELLANEOUS
Qty: 100 STRIP | Refills: 3 | Status: SHIPPED | OUTPATIENT
Start: 2022-11-01 | End: 2022-11-07

## 2022-11-02 ENCOUNTER — OFFICE VISIT (OUTPATIENT)
Dept: SURGERY | Facility: CLINIC | Age: 62
End: 2022-11-02
Payer: COMMERCIAL

## 2022-11-02 VITALS — SYSTOLIC BLOOD PRESSURE: 149 MMHG | HEART RATE: 80 BPM | DIASTOLIC BLOOD PRESSURE: 79 MMHG

## 2022-11-02 DIAGNOSIS — R31.0 GROSS HEMATURIA: Primary | ICD-10-CM

## 2022-11-02 PROCEDURE — 99203 OFFICE O/P NEW LOW 30 MIN: CPT | Performed by: UROLOGY

## 2022-11-02 PROCEDURE — 3077F SYST BP >= 140 MM HG: CPT | Performed by: UROLOGY

## 2022-11-02 PROCEDURE — 3078F DIAST BP <80 MM HG: CPT | Performed by: UROLOGY

## 2022-11-04 ENCOUNTER — TELEPHONE (OUTPATIENT)
Dept: SURGERY | Facility: CLINIC | Age: 62
End: 2022-11-04

## 2022-11-04 NOTE — TELEPHONE ENCOUNTER
Please contact patient and let him know that his urine cytology was negative for floating cancer cells. This is good news. I will see him for his cystoscopy on 11/28. Please remind him that he must schedule his CT scan, as I will need that result prior to his cystoscopy. Thank you!   AR

## 2022-11-07 ENCOUNTER — TELEPHONE (OUTPATIENT)
Dept: FAMILY MEDICINE CLINIC | Facility: CLINIC | Age: 62
End: 2022-11-07

## 2022-11-07 RX ORDER — BLOOD-GLUCOSE METER
KIT MISCELLANEOUS
Qty: 100 STRIP | Refills: 3 | Status: SHIPPED | OUTPATIENT
Start: 2022-11-07

## 2022-11-07 NOTE — TELEPHONE ENCOUNTER
Verified PT's name and . Spoke with patient regarding results, PT verbalized understanding. Provided patient with central scheduling number for CT scan. PT aware to schedule this prior to cystoscopy.

## 2022-11-07 NOTE — TELEPHONE ENCOUNTER
Patient states he called pharmacy today 11/7/22. They do not have Glucose Blood (FREESTYLE LITE TEST) In Vitro Strip. Would like it resent over.       [address] [place], [place]

## 2022-11-08 RX ORDER — BLOOD-GLUCOSE METER
KIT MISCELLANEOUS
Qty: 100 STRIP | Refills: 3 | Status: SHIPPED | OUTPATIENT
Start: 2022-11-08

## 2022-11-16 ENCOUNTER — HOSPITAL ENCOUNTER (OUTPATIENT)
Dept: CT IMAGING | Age: 62
Discharge: HOME | End: 2022-11-16
Attending: UROLOGY
Payer: COMMERCIAL

## 2022-11-16 DIAGNOSIS — R31.0 GROSS HEMATURIA: ICD-10-CM

## 2022-11-16 PROCEDURE — 76377 3D RENDER W/INTRP POSTPROCES: CPT | Performed by: UROLOGY

## 2022-11-16 PROCEDURE — 74178 CT ABD&PLV WO CNTR FLWD CNTR: CPT | Performed by: UROLOGY

## 2022-11-17 ENCOUNTER — TELEPHONE (OUTPATIENT)
Dept: SURGERY | Facility: CLINIC | Age: 62
End: 2022-11-17

## 2022-11-17 NOTE — TELEPHONE ENCOUNTER
I spoke with pt and pt unable to come in tomorrow before 1pm, pt gets out of work at 1pm and can only come in after. Pt stated he has cysto scheduled for 11/28/22, is it okay for patient to wait until then to discuss these results? Please advise.

## 2022-11-17 NOTE — TELEPHONE ENCOUNTER
Can you see if patient is able to come tomorrow for a visit at 8am, 9am , 10am or 1pm?    I want to discuss his CT scan and potentially talk about treatment of kidney stones. I left a message as well. Also, I suggested that if he has flank pain, fevers or chills he should go to the emergency room.     AR

## 2022-11-17 NOTE — PROGRESS NOTES
Reviewed CT scan. There is a large right renal pelvic/UPJ stone that will need treatment. There is a left lower pole stone that may need treatment. Asked nursing to see if patient can come to the clinic tomorrow 79/99 at certain times. I also left a voice mail on patient's number to see if he can come in tomorrow.   I also mentioned that if he has fevers or chills, he must go to the ED

## 2022-11-21 RX ORDER — FINASTERIDE 5 MG/1
5 TABLET, FILM COATED ORAL DAILY
Qty: 90 TABLET | Refills: 1 | Status: SHIPPED | OUTPATIENT
Start: 2022-11-21

## 2022-11-21 NOTE — TELEPHONE ENCOUNTER
Refill passed per [facility] [employer] [email or web address] protocol. Requested Prescriptions   Pending Prescriptions Disp Refills    FINASTERIDE 5 MG Oral Tab [Pharmacy Med Name: Finasteride 5 Mg Tab Auro] 90 tablet 0     Sig: Take 1 tablet (5 mg total) by mouth daily.        Genitourinary Medications Passed - 11/21/2022  3:38 PM        Passed - Patient does not have pulmonary hypertension on problem list        Passed - In person appointment or virtual visit in the past 12 mos or appointment in next 3 mos     Recent Outpatient Visits              2 weeks ago Gross hematuria    [facility] for Briannagerardo Gerco MD    Office Visit    1 month ago [address], [address], [address], Lindy Pekajal, APRN    Office Visit    9 months ago Essential hypertension    [facility], [facility], [address], [address], Lindy Peeks, APRN    Office Visit    1 year ago Prostate cancer screening    [facility], [address], [address], Lindy Peeks, APRN    Office Visit    1 year ago Essential hypertension    [place] [facility], [address], [address], Lindy Peeks, APRN    Office Visit          Future Appointments         Provider Department Appt Notes    In 1 week Milagros Seo MD [facility], [address], [address]                   Recent Outpatient Visits              2 weeks ago [employer] hematuria    [facility], [address], Ronda Ambrose MD    Office Visit    1 month ago [address], [address], VIKTORIYA Back    Office Visit    9 months ago Essential hypertension    [place] [facility] [place], [address], [address], Lindy Peeks, APRN    Office Visit    1 year ago Prostate cancer screening    [place] [facility] [place], [address], [address], Lindy Peeks, APRN    Office Visit    1 year ago Essential hypertension    [facility], [facility], [address], [place]eta Grajeda, APRN Office Visit           Future Appointments         Provider Department Appt Notes    In 1 week Chrissy Lu MD [facility], [address], [facility]

## 2022-11-28 ENCOUNTER — TELEPHONE (OUTPATIENT)
Dept: SURGERY | Facility: CLINIC | Age: 62
End: 2022-11-28

## 2022-11-28 ENCOUNTER — PROCEDURE (OUTPATIENT)
Dept: SURGERY | Facility: CLINIC | Age: 62
End: 2022-11-28
Payer: COMMERCIAL

## 2022-11-28 VITALS
DIASTOLIC BLOOD PRESSURE: 80 MMHG | WEIGHT: 205 LBS | SYSTOLIC BLOOD PRESSURE: 130 MMHG | BODY MASS INDEX: 27.77 KG/M2 | HEART RATE: 91 BPM | HEIGHT: 72 IN

## 2022-11-28 DIAGNOSIS — N20.0 NEPHROLITHIASIS: Primary | ICD-10-CM

## 2022-11-28 PROCEDURE — 87086 URINE CULTURE/COLONY COUNT: CPT | Performed by: UROLOGY

## 2022-11-28 NOTE — PROGRESS NOTES
Jose Alfredo Szymanski,  Can we shedule him for right ureteroscopy with laser lithotripsy, stone basketing, retrograde pyelogram and stent placement? Maybe next Tuesday or Thursday? It is a very large stone and I would budget for 2 hours. He will need a CBC, Chem-7, INR, urine culture. I did order these. Thanks!   [address] [facility]    Urology Surgery Scheduling Request    Location: [address] [place] [address] OR    Surgeon: Kanika Stewart MD    Asst. Surgeon: N/A    Diagnosis: right ureteral stone    Procedure: Cystoscopy, right ureteroscopy, laser lithotripsy, stone basketing, retrograde pyelogram, stent placement    Procedure CPT Code (if known): 81820    Anesthesia: General     Time Frame: 1 to 2 weeks    Time needed: 2 hours    Special Equipment: Holmium Laser, flexible ureteroscope    On Call to OR: Anc 2 g    Admission: Day Surgery    Pre-op Testing: N/A     Need Pre-op Clearance: None    Estimated Post Op/Follow Up Appt:  will make postop    Kanika Stewart MD  11/28/2022

## 2022-11-28 NOTE — PROCEDURES
CYSTOSCOPY (MALE)    PRE-OP DIAGNOSIS: Hematuria    POST-OP DIAGNOSIS: Same    PROCEDURE: Cystoscopy    SURGEON: Kirk Ashford MD    ASSISTANT: none     EBL: minimal    FINDINGS:   1. Urethra: No meatal stenosis, no anterior or posterior urethral strictures, open bladder neck   2. Prostate: Bilobar coaptation with mild intravesical component   3. Bladder: Bilateral ureteral orifices in orthotopic position with efflux, no suspicious or concerning erythematous lesions, no papillary bladder tumors, small stone in the bladder  4. Retroflexion: Mild intravesical component of prostate   5. Other findings: none    INDICATIONS: Gross hematuria with negative cytology. CT urogram with 1 cm stone in the right renal pelvis. PROCEDURE: Patient was brought to the procedure suite and a timeout was performed identifying the patient,  and procedure being performed. The risks of the procedure were once again detailed to the patient including bleeding, infection, dysuria. The patient agreed to proceed. The patient had a negative urinalysis. No antibiotics were given to patient prior to this procedure. He was placed in a supine position on the table and a flexible cystoscope was inserted per urethra. There were no obvious urethral strictures in the anterior, membranous or posterior urethra. The prostate appeared enlarged with mild intravesical component. Once in the bladder we performed a full diagnostic/surveillance cystoscopy which demonstrated no abnormalities other than a small stone in the bladder. On retroflexion we noted no abnormalities. The scope was then carefully removed and once again no urethral abnormalities were noted. There were no complications after this procedure and the patient tolerated the procedure without issue. IMPRESSION: cystoscopy negative    PLAN:    1.  Will need ureteroscopy see note from today

## 2022-11-29 ENCOUNTER — TELEPHONE (OUTPATIENT)
Dept: SURGERY | Facility: CLINIC | Age: 62
End: 2022-11-29

## 2022-11-29 DIAGNOSIS — N20.1 RIGHT URETERAL STONE: Primary | ICD-10-CM

## 2022-11-29 NOTE — TELEPHONE ENCOUNTER
Patient called back and has confirmed that he would like to more forward with surgery for December 8th, 2022 at 9/;30am. Patient was informed that he must complete labs this week. Patient agreed and will await final instructions from hospital 1-2 days prior to surgery.  Surgical request send over and surgery was added to MD marie

## 2022-11-30 ENCOUNTER — LAB ENCOUNTER (OUTPATIENT)
Dept: LAB | Age: 62
End: 2022-11-30
Attending: UROLOGY
Payer: COMMERCIAL

## 2022-11-30 DIAGNOSIS — N20.0 NEPHROLITHIASIS: ICD-10-CM

## 2022-11-30 LAB
DEPRECATED RDW RBC AUTO: 41.7 FL (ref 35.1–46.3)
ERYTHROCYTE [DISTWIDTH] IN BLOOD BY AUTOMATED COUNT: 12.8 % (ref 11–15)
HCT VFR BLD AUTO: 42.6 %
HGB BLD-MCNC: 14.1 G/DL
MCH RBC QN AUTO: 29.3 PG (ref 26–34)
MCHC RBC AUTO-ENTMCNC: 33.1 G/DL (ref 31–37)
MCV RBC AUTO: 88.4 FL
PLATELET # BLD AUTO: 297 10(3)UL (ref 150–450)
RBC # BLD AUTO: 4.82 X10(6)UL
WBC # BLD AUTO: 8.6 X10(3) UL (ref 4–11)

## 2022-11-30 PROCEDURE — 36415 COLL VENOUS BLD VENIPUNCTURE: CPT

## 2022-11-30 PROCEDURE — 85610 PROTHROMBIN TIME: CPT

## 2022-11-30 PROCEDURE — 85027 COMPLETE CBC AUTOMATED: CPT

## 2022-11-30 PROCEDURE — 80048 BASIC METABOLIC PNL TOTAL CA: CPT

## 2022-12-01 LAB
ANION GAP SERPL CALC-SCNC: 8 MMOL/L (ref 0–18)
BUN BLD-MCNC: 20 MG/DL (ref 7–18)
BUN/CREAT SERPL: 16.3 (ref 10–20)
CALCIUM BLD-MCNC: 9.9 MG/DL (ref 8.5–10.1)
CHLORIDE SERPL-SCNC: 113 MMOL/L (ref 98–112)
CO2 SERPL-SCNC: 25 MMOL/L (ref 21–32)
CREAT BLD-MCNC: 1.23 MG/DL
FASTING STATUS PATIENT QL REPORTED: NO
GFR SERPLBLD BASED ON 1.73 SQ M-ARVRAT: 66 ML/MIN/1.73M2 (ref 60–?)
GLUCOSE BLD-MCNC: 105 MG/DL (ref 70–99)
INR BLD: 1 (ref 0.85–1.16)
OSMOLALITY SERPL CALC.SUM OF ELEC: 305 MOSM/KG (ref 275–295)
POTASSIUM SERPL-SCNC: 4.6 MMOL/L (ref 3.5–5.1)
PROTHROMBIN TIME: 13.1 SECONDS (ref 11.6–14.8)
SODIUM SERPL-SCNC: 146 MMOL/L (ref 136–145)

## 2022-12-05 ENCOUNTER — LAB ENCOUNTER (OUTPATIENT)
Dept: LAB | Facility: HOSPITAL | Age: 62
End: 2022-12-05
Attending: UROLOGY
Payer: COMMERCIAL

## 2022-12-05 DIAGNOSIS — Z01.818 PREOP TESTING: ICD-10-CM

## 2022-12-06 LAB — SARS-COV-2 RNA RESP QL NAA+PROBE: NOT DETECTED

## 2022-12-07 NOTE — H&P
PRE-OP NOTE     NAME/MRN/PROCEDURE: Angela Valencia -cystoscopy, right ureteroscopy, laser lithotripsy, stent placement, possible left ureteroscopy with laser lithotripsy and stent placement  HPI: 43-year-old man with a history of gross hematuria who was noted to have a large partially obstructing right ureteropelvic junction stone and a small left lower pole stone. He was counseled by me on proceeding with ureteroscopy with laser lithotripsy starting on the right. As long as everything goes smoothly, we could consider attempting treatment of the left lower pole stone. We discussed ureteroscopy including how it is performed and associated risks. I reviewed risks including bleeding, infection, damage to surrounding structures (urethra, bladder, ureter, kidney), inability to treat the stone and need for stent alone, need for additional/multiple procedures, need for prolonged stent, need for nephrostomy tube, stent colic/discomfort (extreme flank pain, bladder discomfort/spasms, urinary urgency and frequency, hematuria), ureteral stricture, ureteral avulsion requiring open surgery, sepsis, anesthesia complications (cardiorespiratory complications). I also counseled patient that NSAIDs and blood thinning agents need to be stopped appropriately prior to surgery. Discussed return precautions including fevers, chills, nausea, vomiting, intractable pain. Stent colic including flank pain, urgency, frequency and blood in the urine is common. Patient agreed with the plan and understood the above.   PMH: Nephrolithiasis, hypertension, hyperlipidemia, nephrolithiasis, prostatitis, type 2 diabetes  PSH: Appendectomy, hernia surgery, thoracotomy  MEDS: Amlodipine, sorbic acid, coenzyme Q 10, empagliflozin, finasteride, lisinopril, metformin, rosuvastatin  ALL: Ciprofloxacin (he does not remember the reaction)  LABS: COVID-negative, INR 1.0, creatinine 1.23, GFR 66, white blood cell count 8.6, platelets 723, hematocrit 42.6, urine culture negative, cytology benign, PSA 2.44, hemoglobin A1c 6.5%  IMAGING: HU 1285,  6 ft tall - 26cm stent      OTHER: none  ABX: Ancef

## 2022-12-08 ENCOUNTER — APPOINTMENT (OUTPATIENT)
Dept: GENERAL RADIOLOGY | Facility: HOSPITAL | Age: 62
End: 2022-12-08
Attending: UROLOGY
Payer: COMMERCIAL

## 2022-12-08 ENCOUNTER — HOSPITAL ENCOUNTER (OUTPATIENT)
Facility: HOSPITAL | Age: 62
Setting detail: HOSPITAL OUTPATIENT SURGERY
Discharge: HOME | End: 2022-12-08
Attending: UROLOGY | Admitting: UROLOGY
Payer: COMMERCIAL

## 2022-12-08 ENCOUNTER — ANESTHESIA (OUTPATIENT)
Dept: SURGERY | Facility: HOSPITAL | Age: 62
End: 2022-12-08
Payer: COMMERCIAL

## 2022-12-08 ENCOUNTER — TELEPHONE (OUTPATIENT)
Dept: SURGERY | Facility: CLINIC | Age: 62
End: 2022-12-08

## 2022-12-08 ENCOUNTER — ANESTHESIA EVENT (OUTPATIENT)
Dept: SURGERY | Facility: HOSPITAL | Age: 62
End: 2022-12-08
Payer: COMMERCIAL

## 2022-12-08 VITALS
SYSTOLIC BLOOD PRESSURE: 130 MMHG | DIASTOLIC BLOOD PRESSURE: 71 MMHG | RESPIRATION RATE: 18 BRPM | TEMPERATURE: 98 F | OXYGEN SATURATION: 99 % | BODY MASS INDEX: 27.22 KG/M2 | WEIGHT: 201 LBS | HEIGHT: 72 IN | HEART RATE: 61 BPM

## 2022-12-08 DIAGNOSIS — Z01.818 PREOP TESTING: Primary | ICD-10-CM

## 2022-12-08 DIAGNOSIS — N20.1 RIGHT URETERAL STONE: ICD-10-CM

## 2022-12-08 LAB
GLUCOSE BLDC GLUCOMTR-MCNC: 132 MG/DL (ref 70–99)
GLUCOSE BLDC GLUCOMTR-MCNC: 148 MG/DL (ref 70–99)

## 2022-12-08 PROCEDURE — 82365 CALCULUS SPECTROSCOPY: CPT | Performed by: UROLOGY

## 2022-12-08 PROCEDURE — 0TC68ZZ EXTIRPATION OF MATTER FROM RIGHT URETER, VIA NATURAL OR ARTIFICIAL OPENING ENDOSCOPIC: ICD-10-PCS | Performed by: UROLOGY

## 2022-12-08 PROCEDURE — BT1D1ZZ FLUOROSCOPY OF RIGHT KIDNEY, URETER AND BLADDER USING LOW OSMOLAR CONTRAST: ICD-10-PCS | Performed by: UROLOGY

## 2022-12-08 PROCEDURE — 82962 GLUCOSE BLOOD TEST: CPT

## 2022-12-08 PROCEDURE — 0T768DZ DILATION OF RIGHT URETER WITH INTRALUMINAL DEVICE, VIA NATURAL OR ARTIFICIAL OPENING ENDOSCOPIC: ICD-10-PCS | Performed by: UROLOGY

## 2022-12-08 PROCEDURE — 88300 SURGICAL PATH GROSS: CPT | Performed by: UROLOGY

## 2022-12-08 DEVICE — URETERAL STENT
Type: IMPLANTABLE DEVICE | Site: URETER | Status: FUNCTIONAL
Brand: ASCERTA™

## 2022-12-08 RX ORDER — NICOTINE POLACRILEX 4 MG
15 LOZENGE BUCCAL
Status: DISCONTINUED | OUTPATIENT
Start: 2022-12-08 | End: 2022-12-08 | Stop reason: HOSPADM

## 2022-12-08 RX ORDER — MORPHINE SULFATE 4 MG/ML
2 INJECTION, SOLUTION INTRAMUSCULAR; INTRAVENOUS EVERY 10 MIN PRN
Status: DISCONTINUED | OUTPATIENT
Start: 2022-12-08 | End: 2022-12-08

## 2022-12-08 RX ORDER — HYDROMORPHONE HYDROCHLORIDE 1 MG/ML
0.4 INJECTION, SOLUTION INTRAMUSCULAR; INTRAVENOUS; SUBCUTANEOUS EVERY 5 MIN PRN
Status: DISCONTINUED | OUTPATIENT
Start: 2022-12-08 | End: 2022-12-08

## 2022-12-08 RX ORDER — SODIUM CHLORIDE, SODIUM LACTATE, POTASSIUM CHLORIDE, CALCIUM CHLORIDE 600; 310; 30; 20 MG/100ML; MG/100ML; MG/100ML; MG/100ML
INJECTION, SOLUTION INTRAVENOUS CONTINUOUS
Status: DISCONTINUED | OUTPATIENT
Start: 2022-12-08 | End: 2022-12-08

## 2022-12-08 RX ORDER — DEXTROSE MONOHYDRATE 25 G/50ML
50 INJECTION, SOLUTION INTRAVENOUS
Status: DISCONTINUED | OUTPATIENT
Start: 2022-12-08 | End: 2022-12-08 | Stop reason: HOSPADM

## 2022-12-08 RX ORDER — DEXAMETHASONE SODIUM PHOSPHATE 4 MG/ML
VIAL (ML) INJECTION AS NEEDED
Status: DISCONTINUED | OUTPATIENT
Start: 2022-12-08 | End: 2022-12-08 | Stop reason: SURG

## 2022-12-08 RX ORDER — NALOXONE HYDROCHLORIDE 0.4 MG/ML
80 INJECTION, SOLUTION INTRAMUSCULAR; INTRAVENOUS; SUBCUTANEOUS AS NEEDED
Status: DISCONTINUED | OUTPATIENT
Start: 2022-12-08 | End: 2022-12-08

## 2022-12-08 RX ORDER — HYDROMORPHONE HYDROCHLORIDE 1 MG/ML
0.6 INJECTION, SOLUTION INTRAMUSCULAR; INTRAVENOUS; SUBCUTANEOUS EVERY 5 MIN PRN
Status: DISCONTINUED | OUTPATIENT
Start: 2022-12-08 | End: 2022-12-08

## 2022-12-08 RX ORDER — NICOTINE POLACRILEX 4 MG
30 LOZENGE BUCCAL
Status: DISCONTINUED | OUTPATIENT
Start: 2022-12-08 | End: 2022-12-08 | Stop reason: HOSPADM

## 2022-12-08 RX ORDER — NICOTINE POLACRILEX 4 MG
30 LOZENGE BUCCAL
Status: DISCONTINUED | OUTPATIENT
Start: 2022-12-08 | End: 2022-12-08

## 2022-12-08 RX ORDER — LIDOCAINE HYDROCHLORIDE 10 MG/ML
INJECTION, SOLUTION EPIDURAL; INFILTRATION; INTRACAUDAL; PERINEURAL AS NEEDED
Status: DISCONTINUED | OUTPATIENT
Start: 2022-12-08 | End: 2022-12-08 | Stop reason: SURG

## 2022-12-08 RX ORDER — CEFAZOLIN SODIUM/WATER 2 G/20 ML
2 SYRINGE (ML) INTRAVENOUS ONCE
Status: DISCONTINUED | OUTPATIENT
Start: 2022-12-08 | End: 2022-12-08 | Stop reason: HOSPADM

## 2022-12-08 RX ORDER — DOCUSATE SODIUM 100 MG/1
100 CAPSULE, LIQUID FILLED ORAL 2 TIMES DAILY
Qty: 30 CAPSULE | Refills: 11 | Status: SHIPPED | OUTPATIENT
Start: 2022-12-08

## 2022-12-08 RX ORDER — DEXTROSE MONOHYDRATE 25 G/50ML
50 INJECTION, SOLUTION INTRAVENOUS
Status: DISCONTINUED | OUTPATIENT
Start: 2022-12-08 | End: 2022-12-08

## 2022-12-08 RX ORDER — MORPHINE SULFATE 4 MG/ML
4 INJECTION, SOLUTION INTRAMUSCULAR; INTRAVENOUS EVERY 10 MIN PRN
Status: DISCONTINUED | OUTPATIENT
Start: 2022-12-08 | End: 2022-12-08

## 2022-12-08 RX ORDER — HYDROMORPHONE HYDROCHLORIDE 1 MG/ML
0.2 INJECTION, SOLUTION INTRAMUSCULAR; INTRAVENOUS; SUBCUTANEOUS EVERY 5 MIN PRN
Status: DISCONTINUED | OUTPATIENT
Start: 2022-12-08 | End: 2022-12-08

## 2022-12-08 RX ORDER — NICOTINE POLACRILEX 4 MG
15 LOZENGE BUCCAL
Status: DISCONTINUED | OUTPATIENT
Start: 2022-12-08 | End: 2022-12-08

## 2022-12-08 RX ORDER — ROCURONIUM BROMIDE 10 MG/ML
INJECTION, SOLUTION INTRAVENOUS AS NEEDED
Status: DISCONTINUED | OUTPATIENT
Start: 2022-12-08 | End: 2022-12-08 | Stop reason: SURG

## 2022-12-08 RX ORDER — MORPHINE SULFATE 10 MG/ML
6 INJECTION, SOLUTION INTRAMUSCULAR; INTRAVENOUS EVERY 10 MIN PRN
Status: DISCONTINUED | OUTPATIENT
Start: 2022-12-08 | End: 2022-12-08

## 2022-12-08 RX ORDER — ONDANSETRON 2 MG/ML
INJECTION INTRAMUSCULAR; INTRAVENOUS AS NEEDED
Status: DISCONTINUED | OUTPATIENT
Start: 2022-12-08 | End: 2022-12-08 | Stop reason: SURG

## 2022-12-08 RX ORDER — SULFAMETHOXAZOLE AND TRIMETHOPRIM 800; 160 MG/1; MG/1
1 TABLET ORAL 2 TIMES DAILY
Qty: 6 TABLET | Refills: 0 | Status: SHIPPED | OUTPATIENT
Start: 2022-12-08 | End: 2022-12-11

## 2022-12-08 RX ORDER — SODIUM CHLORIDE 9 MG/ML
INJECTION, SOLUTION INTRAVENOUS CONTINUOUS PRN
Status: DISCONTINUED | OUTPATIENT
Start: 2022-12-08 | End: 2022-12-08 | Stop reason: SURG

## 2022-12-08 RX ORDER — TAMSULOSIN HYDROCHLORIDE 0.4 MG/1
0.4 CAPSULE ORAL DAILY
Qty: 30 CAPSULE | Refills: 0 | Status: SHIPPED | OUTPATIENT
Start: 2022-12-08 | End: 2023-01-07

## 2022-12-08 RX ORDER — NEOSTIGMINE METHYLSULFATE 1 MG/ML
INJECTION, SOLUTION INTRAVENOUS AS NEEDED
Status: DISCONTINUED | OUTPATIENT
Start: 2022-12-08 | End: 2022-12-08 | Stop reason: SURG

## 2022-12-08 RX ORDER — ACETAMINOPHEN 500 MG
1000 TABLET ORAL ONCE
Status: COMPLETED | OUTPATIENT
Start: 2022-12-08 | End: 2022-12-08

## 2022-12-08 RX ORDER — GLYCOPYRROLATE 0.2 MG/ML
INJECTION, SOLUTION INTRAMUSCULAR; INTRAVENOUS AS NEEDED
Status: DISCONTINUED | OUTPATIENT
Start: 2022-12-08 | End: 2022-12-08 | Stop reason: SURG

## 2022-12-08 RX ADMIN — SODIUM CHLORIDE: 9 INJECTION, SOLUTION INTRAVENOUS at 09:13:00

## 2022-12-08 RX ADMIN — LIDOCAINE HYDROCHLORIDE 50 MG: 10 INJECTION, SOLUTION EPIDURAL; INFILTRATION; INTRACAUDAL; PERINEURAL at 09:13:00

## 2022-12-08 RX ADMIN — NEOSTIGMINE METHYLSULFATE 4 MG: 1 INJECTION, SOLUTION INTRAVENOUS at 10:12:00

## 2022-12-08 RX ADMIN — DEXAMETHASONE SODIUM PHOSPHATE 4 MG: 4 MG/ML VIAL (ML) INJECTION at 09:13:00

## 2022-12-08 RX ADMIN — GLYCOPYRROLATE 0.8 MG: 0.2 INJECTION, SOLUTION INTRAMUSCULAR; INTRAVENOUS at 10:12:00

## 2022-12-08 RX ADMIN — ROCURONIUM BROMIDE 50 MG: 10 INJECTION, SOLUTION INTRAVENOUS at 09:13:00

## 2022-12-08 RX ADMIN — ONDANSETRON 4 MG: 2 INJECTION INTRAMUSCULAR; INTRAVENOUS at 09:13:00

## 2022-12-08 NOTE — ANESTHESIA PROCEDURE NOTES
Airway  Date/Time: 12/8/2022 9:16 AM  Urgency: Elective    Airway not difficult    General Information and Staff    Patient location during procedure: OR  Anesthesiologist: Madi Canas MD  Performed: anesthesiologist     Indications and Patient Condition  Indications for airway management: anesthesia  Sedation level: deep  Preoxygenated: yes  Patient position: sniffing  Mask difficulty assessment: 1 - vent by mask    Final Airway Details  Final airway type: endotracheal airway      Successful airway: ETT  Cuffed: yes   Successful intubation technique: direct laryngoscopy  Endotracheal tube insertion site: oral  Blade: Nick  Blade size: #3  ETT size (mm): 7.0    Cormack-Lehane Classification: grade I - full view of glottis  Placement verified by: chest auscultation and capnometry   Measured from: gums  ETT to gums (cm): 20  Number of attempts at approach: 1    Additional Comments  Atx x 1

## 2022-12-08 NOTE — TELEPHONE ENCOUNTER
Hi   He needs to be set up for a cystoscopy with stent removal in 10 days in the clinic. Please make a note that the scope and grasper should NOT be opened until I talk to the patient first. If he chooses to treat his left sided kidney stone, I will not remove the stent in the clinic and will instead set him up for a second procedure and remove the stent in the OR. Thanks!   AR

## 2022-12-08 NOTE — OPERATIVE REPORT
OPERATIVE REPORT  DATE OF SURGERY: 12/8/2022  PREOPERATIVE DIAGNOSIS: right ureteral stone  POSTOPERATIVE DIAGNOSIS: same   PROCEDURE: Cystoscopy, right Ureteroscopy, laser lithotripsy, right retrograde pyelogram, placement of 6 x 28cm double j stent - no string  SURGEON: Barbara Rosario MD  ASSISTANT: none  ANESTHESIA: general  FLUIDS: per anesthesia  URINE OUTPUT: n/a  ESTIMATED BLOOD LOSS: 5cc  SPECIMENS: right kidney stone  CONDITION: Stable to PACU    INDICATIONS:  59-year-old man with a history of gross hematuria who was noted to have a large partially obstructing right ureteropelvic junction stone and a small left lower pole stone. He was counseled by me on proceeding with ureteroscopy with laser lithotripsy starting on the right. As long as everything goes smoothly, we could consider attempting treatment of the left lower pole stone. We discussed ureteroscopy including how it is performed and associated risks. I reviewed risks including bleeding, infection, damage to surrounding structures (urethra, bladder, ureter, kidney), inability to treat the stone and need for stent alone, need for additional/multiple procedures, need for prolonged stent, need for nephrostomy tube, stent colic/discomfort (extreme flank pain, bladder discomfort/spasms, urinary urgency and frequency, hematuria), ureteral stricture, ureteral avulsion requiring open surgery, sepsis, anesthesia complications (cardiorespiratory complications). I also counseled patient that NSAIDs and blood thinning agents need to be stopped appropriately prior to surgery. Discussed return precautions including fevers, chills, nausea, vomiting, intractable pain. Stent colic including flank pain, urgency, frequency and blood in the urine is common. Patient agreed with the plan and understood the above. PROCEDURE: The patient was met in the preoperative holding area.  Appropriate informed consent was obtained and the patient was brought to the operative suite. ? Appropriate timeout was performed per hospital protocol. After the successful induction of general anesthesia, the patient was placed in the dorsal lithotomy position. ?Pressure points were meticulously padded. The patient was prepped and draped in a standard sterile fashion and IV Ancef was given as preoperative prophylaxis. At this point, we passed a 22-Senegalese cystoscope per urethra into the bladder. ? We identified the right ureteric orifice and used fluoroscopic guidance to cannulate this with a .038 Sensor guidewire to the level of the kidney. ?A dual lumen catheter was then passed into the proximal ureter and a second wire was passed through the dual lumen catheter and the dual lumen catheter was removed. We kept one wire as a safety wire and an over our working wire, we passed a 12/14 [facility] keeping one wire behind as a safety wire. We then passed a ureteroscope through the access sheath up into the kidney. The stone was seen  and was Large in size. We brought in the 200 nanometer Track tip laser fiber on variable settings we ablated into smaller pieces. The tipless nitinol basket was used to completely extract all of the significant fragments and then we confirmed this with ureteroscopy again. ?We brought the laser back in and dusted a few more tiny fragments with the laser, but then there was nothing significant remaining. ? At this point, the laser fiber was removed and the basket was removed. ? The access sheath was removed while scoping the entire ureter without evidence of any trauma or stone. ? Using the safety wire and fluoroscopic guidance, we passed a 6-Senegalese x 28 cm stent with a nice coil in the right kidney and a nice coil in the bladder, the string was not left in place to allow for dust to pass. The procedure was then complete. Patient was awoken from anesthesia and taken to the recovery room in stable condition. All counts were correct x2.     IMPRESSION: s/p right ureteroscopy with all stone treated - stent NOT on a string, will need to be removed in clinic versus at time of left ureteroscopy if patient chooses definitive treatment versus observation    PLAN:  1. Tamsulosin, bactrim, colace  2. RTC 10 days for stent removal (unless he choose to treat left stone in which case we will set him up for that procedure)  3.  Stone analysis

## 2022-12-11 LAB — CALCULI MASS: 156 MG

## 2022-12-19 ENCOUNTER — PROCEDURE (OUTPATIENT)
Dept: SURGERY | Facility: CLINIC | Age: 62
End: 2022-12-19
Payer: COMMERCIAL

## 2022-12-19 VITALS — DIASTOLIC BLOOD PRESSURE: 80 MMHG | HEART RATE: 99 BPM | SYSTOLIC BLOOD PRESSURE: 130 MMHG

## 2022-12-19 DIAGNOSIS — N20.0 NEPHROLITHIASIS: ICD-10-CM

## 2022-12-19 DIAGNOSIS — N20.1 RIGHT URETERAL STONE: Primary | ICD-10-CM

## 2022-12-19 DIAGNOSIS — R31.0 GROSS HEMATURIA: ICD-10-CM

## 2022-12-19 RX ORDER — SULFAMETHOXAZOLE AND TRIMETHOPRIM 800; 160 MG/1; MG/1
1 TABLET ORAL ONCE
Status: COMPLETED | OUTPATIENT
Start: 2022-12-19 | End: 2022-12-19

## 2022-12-19 RX ADMIN — SULFAMETHOXAZOLE AND TRIMETHOPRIM 1 TABLET: 800; 160 TABLET ORAL at 14:56:00

## 2022-12-19 NOTE — PROCEDURES
Cysto stent removal procedure  note  CYSTOSCOPY STENT REMOVAL     PRE-OP DIAGNOSIS: Nephrolithiasis, right     POST-OP DIAGNOSIS: Same     PROCEDURE:  1. Cystoscopy, removal of right ureteral stent     SURGEON: Kanika Stewart MD     ASSISTANT: none     EBL: minimal     FINDINGS:  1. Stent removed in its entirety stent , care taken please see Provation     INDICATIONS: s/p right ureteroscopy         PROCEDURE: Patient was brought to the procedure suite and an time-out was performed identifiying the patient,  and procedure being performed. The risks of the procedure were once again detailed to patient including bleeding, infection, dysuria. The patient agreed to proceed. Bactrim was given as antibiotic prophylaxis. Patient was placed in supine position on the table and a flexible cystoscope was inserted per urethra after lidojet had been instilled for 5 minutes. We immediately saw the ureteral stent in the bladder emanating from the right ureteral orifice. We used a stent grasper to grasp the distal coil of the stent and removed it from the body in its entirety. A picture of the intact stent was taken for documentation purposes in Provation. There were no complications after this procedure and the patient tolerated the procedure without issue. IMPRESSION: s/p right stent removal      We will have the patient return in 8 weeks for follow-up with a renal bladder ultrasound to ensure he does not have silent hydronephrosis     PLAN:   1. RBUS 8 weeks - to eval for silent hydro, possible growth of left lower pole stone  2.  Will set up for left ureteroscopy at that time

## 2023-01-08 RX ORDER — AMLODIPINE BESYLATE 5 MG/1
5 TABLET ORAL DAILY
Qty: 90 TABLET | Refills: 1 | Status: SHIPPED | OUTPATIENT
Start: 2023-01-08

## 2023-03-20 ENCOUNTER — TELEPHONE (OUTPATIENT)
Dept: SURGERY | Facility: CLINIC | Age: 63
End: 2023-03-20

## 2023-03-20 NOTE — TELEPHONE ENCOUNTER
Pt returned my call. I confirmed his full name and . Pt states that he still has blood in his urine but denies any pain, n/v, fever, flank pain, or any other pain. I checked with Dr. Odilon Her who stated that the patient should drink lots of water and flush everything out. She also asked that he make an appt to see her in the next week or two. I called the patient back and confirmed name and  again. I made an appt on 3/23 at 3:20 pm with the patient. I also told him to go to the ER if he has problems urinating, signs and symptoms of an infection, or increased bloody drainage. The patient verbalized his understanding.

## 2023-03-22 ENCOUNTER — TELEPHONE (OUTPATIENT)
Dept: SURGERY | Facility: CLINIC | Age: 63
End: 2023-03-22

## 2023-03-23 ENCOUNTER — OFFICE VISIT (OUTPATIENT)
Dept: SURGERY | Facility: CLINIC | Age: 63
End: 2023-03-23

## 2023-03-23 DIAGNOSIS — N20.1 RIGHT URETERAL STONE: Primary | ICD-10-CM

## 2023-03-23 DIAGNOSIS — N20.0 NEPHROLITHIASIS: ICD-10-CM

## 2023-03-23 PROCEDURE — 99213 OFFICE O/P EST LOW 20 MIN: CPT | Performed by: UROLOGY

## 2023-03-23 RX ORDER — POLYETHYLENE GLYCOL 3350 17 G/17G
17 POWDER, FOR SOLUTION ORAL DAILY
Qty: 30 PACKET | Refills: 0 | Status: SHIPPED | OUTPATIENT
Start: 2023-03-23

## 2023-03-23 RX ORDER — TAMSULOSIN HYDROCHLORIDE 0.4 MG/1
0.4 CAPSULE ORAL DAILY
Qty: 30 CAPSULE | Refills: 11 | Status: SHIPPED | OUTPATIENT
Start: 2023-03-23 | End: 2023-04-22

## 2023-04-10 ENCOUNTER — TELEPHONE (OUTPATIENT)
Dept: SURGERY | Facility: CLINIC | Age: 63
End: 2023-04-10

## 2023-04-10 NOTE — TELEPHONE ENCOUNTER
Received fax from [facility] with approval of CT abdomen. CPT code: 51165    Will send copy to scanning.

## 2023-05-02 ENCOUNTER — HOSPITAL ENCOUNTER (OUTPATIENT)
Dept: CT IMAGING | Facility: HOSPITAL | Age: 63
Discharge: HOME | End: 2023-05-02
Attending: UROLOGY
Payer: COMMERCIAL

## 2023-05-02 DIAGNOSIS — N20.0 NEPHROLITHIASIS: ICD-10-CM

## 2023-05-02 PROCEDURE — 74176 CT ABD & PELVIS W/O CONTRAST: CPT | Performed by: UROLOGY

## 2023-05-18 ENCOUNTER — TELEPHONE (OUTPATIENT)
Dept: SURGERY | Facility: CLINIC | Age: 63
End: 2023-05-18

## 2023-05-18 NOTE — TELEPHONE ENCOUNTER
- LMTCB on VM, asking pt to call [phone] re: his appt with Dr. Herman Arce on Monday, May 22, asking if pt could move appt to the morning, starting as early as 8:10.    *call center please transfer call if pt calls back

## 2023-05-18 NOTE — TELEPHONE ENCOUNTER
- s/w pt; identity verified with name and   - Pt states that he works until 1:30pm, so is unable to come in the morning.   He states the earliest he could come is 2:30pm, consulted with Shira Grande, and she approved the change from 3:20 to 2:30pm.   - Encounter complete

## 2023-05-22 ENCOUNTER — OFFICE VISIT (OUTPATIENT)
Dept: SURGERY | Facility: CLINIC | Age: 63
End: 2023-05-22

## 2023-05-22 DIAGNOSIS — N20.0 NEPHROLITHIASIS: Primary | ICD-10-CM

## 2023-05-22 PROCEDURE — 99213 OFFICE O/P EST LOW 20 MIN: CPT | Performed by: UROLOGY

## 2023-06-06 ENCOUNTER — OFFICE VISIT (OUTPATIENT)
Dept: FAMILY MEDICINE CLINIC | Facility: CLINIC | Age: 63
End: 2023-06-06

## 2023-06-06 VITALS
DIASTOLIC BLOOD PRESSURE: 88 MMHG | HEIGHT: 72 IN | SYSTOLIC BLOOD PRESSURE: 131 MMHG | BODY MASS INDEX: 25.73 KG/M2 | WEIGHT: 190 LBS | HEART RATE: 105 BPM

## 2023-06-06 DIAGNOSIS — J02.9 SORE THROAT: Primary | ICD-10-CM

## 2023-06-06 DIAGNOSIS — B34.9 VIRAL SYNDROME: ICD-10-CM

## 2023-06-06 DIAGNOSIS — K59.00 CONSTIPATION, UNSPECIFIED CONSTIPATION TYPE: ICD-10-CM

## 2023-06-06 LAB
CONTROL LINE PRESENT WITH A CLEAR BACKGROUND (YES/NO): YES YES/NO
KIT LOT #: NORMAL NUMERIC
STREP GRP A CUL-SCR: NEGATIVE

## 2023-06-06 PROCEDURE — 3075F SYST BP GE 130 - 139MM HG: CPT | Performed by: NURSE PRACTITIONER

## 2023-06-06 PROCEDURE — 3008F BODY MASS INDEX DOCD: CPT | Performed by: NURSE PRACTITIONER

## 2023-06-06 PROCEDURE — 99214 OFFICE O/P EST MOD 30 MIN: CPT | Performed by: NURSE PRACTITIONER

## 2023-06-06 PROCEDURE — 87880 STREP A ASSAY W/OPTIC: CPT | Performed by: NURSE PRACTITIONER

## 2023-06-06 PROCEDURE — 3079F DIAST BP 80-89 MM HG: CPT | Performed by: NURSE PRACTITIONER

## 2023-06-07 ENCOUNTER — TELEPHONE (OUTPATIENT)
Dept: FAMILY MEDICINE CLINIC | Facility: CLINIC | Age: 63
End: 2023-06-07

## 2023-06-07 LAB
FLUAV + FLUBV RNA SPEC NAA+PROBE: NEGATIVE
FLUAV + FLUBV RNA SPEC NAA+PROBE: NEGATIVE
RSV RNA SPEC NAA+PROBE: NEGATIVE
SARS-COV-2 RNA RESP QL NAA+PROBE: NOT DETECTED

## 2023-06-07 NOTE — TELEPHONE ENCOUNTER
Patient is calling and states he was seen yesterday by Albertina Armijo. Patient is requesting a letter for work stating he can return to work tomorrow (6/8/2023).

## 2023-06-27 RX ORDER — AMLODIPINE BESYLATE 5 MG/1
5 TABLET ORAL DAILY
Qty: 90 TABLET | Refills: 0 | Status: SHIPPED | OUTPATIENT
Start: 2023-06-27

## 2023-06-27 NOTE — TELEPHONE ENCOUNTER
Please review. Protocol Failed or has no Protocol. Requested Prescriptions   Pending Prescriptions Disp Refills    AMLODIPINE 5 MG Oral Tab [Pharmacy Med Name: Amlodipine Besylate 5 Mg Tab Unic] 90 tablet 0     Sig: TAKE ONE TABLET BY MOUTH ONE TIME DAILY       Hypertensive Medications Protocol Failed - 6/26/2023  1:30 AM        Failed - CMP or BMP in past 6 months     No results found for this or any previous visit (from the past 4392 hour(s)).             Passed - In person appointment in the past 12 or next 3 months     Recent Outpatient Visits              3 weeks ago Sore throat    Mik Farnsworth, [address], APRN    Office Visit    1 month ago Nephrolithiasis    [facility], CarRuperto Nagy MD    Office Visit    3 months ago Right ureteral stone    Ruperto Gandhi MD    Office Visit    7 months ago Gross hematuria    Ruperto Gandhi MD    Office Visit    8 months ago Merlin Bella APRN    Office Visit                      Passed - Last BP reading less than 140/90     BP Readings from Last 1 Encounters:  06/06/23 : 131/88              Passed - In person appointment or virtual visit in the past 6 months     Recent Outpatient Visits              3 weeks ago Sore throat    Kylah Guerrero, [address], [address], APRN    Office Visit    1 month ago Nephrolithiasis    Jessica Cordoba MD    Office Visit    3 months ago Right ureteral stone    Ruperto Gandhi MD    Office Visit    7 months ago Gross hematuria    Ruperto Gandhi MD    Office Visit    8 months Merlin Dominguez, [place]IYA    Office Visit                      Passed - EGFRCR or GFRNAA > 50     GFR Evaluation  EGFRCR: 66 , resulted on 11/30/2022

## 2023-07-13 RX ORDER — LANCETS 28 GAUGE
EACH MISCELLANEOUS
Qty: 100 EACH | Refills: 3 | Status: SHIPPED | OUTPATIENT
Start: 2023-07-13

## 2023-07-13 RX ORDER — BLOOD-GLUCOSE METER
1 KIT MISCELLANEOUS DAILY
Qty: 100 STRIP | Refills: 3 | Status: SHIPPED | OUTPATIENT
Start: 2023-07-13

## 2023-07-13 NOTE — TELEPHONE ENCOUNTER
Refill passed per [place] [facility] protocol    Requested Prescriptions   Pending Prescriptions Disp Refills    FreeStyle Lancets Does not apply Misc [Pharmacy Med Name: FREESTYLE LANCETS   MIS] 100 each 5     Sig: USE 1  TO CHECK GLUCOSE ONCE DAILY       Diabetic Supplies Protocol Passed - 2023 12:18 PM        Passed - In person appointment or virtual visit in the past 12 mos or appointment in next 3 mos     Recent Outpatient Visits              1 month ago Sore throat    Terese Westfall, [address], APRN    Office Visit    1 month ago Nephrolithiasis    [facility], [address], Sondra Mckeon MD    Office Visit    3 months ago Right ureteral stone    Terese Westfall, Sondra Mckeon MD    Office Visit    8 months ago Gross hematuria    [facility], [address], Sondra Mckeon MD    Office Visit    9 months ago Merlin Bella APRN    Office Visit          Future Appointments         Provider Department Appt Notes    In 2 days [facility] refill Policy advised                 Glucose Blood (FREESTYLE LITE TEST) In Vitro Strip 100 strip 5     Si strip by Other route daily.  Check blood sugar daily       Diabetic Supplies Protocol Passed - 2023 12:18 PM        Passed - In person appointment or virtual visit in the past 12 mos or appointment in next 3 mos     Recent Outpatient Visits              1 month ago Sore throat    Arely Rebekah, [address], [address], APRN    Office Visit    1 month ago Nephrolithiasis    Arely Welch, [address], Sondra Mckeon MD    Office Visit    3 months ago Right ureteral stone    Arely Welch, [address], [place] Nena Bowens MD    Office Visit    8 months ago Gross hematuria    Deepthi Donovan MD    Office Visit    9 months ago Merlin Bella APRN    Office Visit          Future Appointments         Provider Department Appt Notes    In 2 days VIKTORIYA Cardona [facility] refill Policy advised

## 2023-07-13 NOTE — TELEPHONE ENCOUNTER
Spoke with pt,  verified  He is looking for ref for free style lite test strips and lancets, he check his bld sugar once every day. Pt will make a f/u appt soon. No future appointments. Routed to RN triage to run for protocol , thanks. Requested Prescriptions     Pending Prescriptions Disp Refills    FREESTYLE LANCETS Does not apply Misc [Pharmacy Med Name: FREESTYLE LANCETS   MIS] 100 each 5     Sig: USE 1  TO CHECK GLUCOSE ONCE DAILY    Glucose Blood (FREESTYLE LITE TEST) In Vitro Strip 100 strip 5     Si strip by Other route daily.  Check blood sugar daily       Last Office Visit with PCP: 10/6/2022

## 2023-07-14 NOTE — TELEPHONE ENCOUNTER
Please review. Protocol failed / Has no protocol. No Active/ Future labs pended     Requested Prescriptions   Pending Prescriptions Disp Refills    LISINOPRIL 40 MG Oral Tab [Pharmacy Med Name: Lisinopril 40 Mg Tab Lupi] 90 tablet 0     Sig: Take 1 tablet (40 mg total) by mouth daily. Hypertensive Medications Protocol Failed - 7/12/2023 10:32 AM        Failed - CMP or BMP in past 6 months     No results found for this or any previous visit (from the past 4392 hour(s)).             Passed - In person appointment in the past 12 or next 3 months     Recent Outpatient Visits              1 month ago Sore throat    [address] [facility], [facility], APRN    Office Visit    1 month ago Nephrolithiasis    [facility], [address] [facility], Aidan Posadas MD    Office Visit    3 months ago Right ureteral stone    [address] [place] [address], Aidan Posadas MD    Office Visit    8 months ago Gross hematuria    [address] [place] [address], Aidan Posadas MD    Office Visit    9 months ago Merlin Bella APRN    Office Visit          Future Appointments         Provider Department Appt Notes    Tomorrow VIKTORIYA Renae [facility] Policy advised               Passed - Last BP reading less than 140/90     BP Readings from Last 1 Encounters:  06/06/23 : 131/88              Passed - In person appointment or virtual visit in the past 6 months     Recent Outpatient Visits              1 month ago Sore throat    [facility], [address], [address], APRN    Office Visit    1 month ago Nephrolithiasis    Edmund Garcia MD    Office Visit    3 months ago Right ureteral stone    [facility], [address], Vladimir Parks MD    Office Visit    8 months ago Gross hematuria    [facility], [address], Vladimir Parks MD    Office Visit    9 months ago Merlin Bella, APRN    Office Visit          Future Appointments         Provider Department Appt Notes    Tomorrow Gama Andrea, APRN [address], [place] medical refill Policy advised               [facility] or GFRNAA > 50     GFR Evaluation  EGFRCR: 66 , resulted on 11/30/2022             Future Appointments         Provider Department Appt Notes    Tomorrow Vershayy Andrea, APRN [facility], [address], [place] medical refill Policy advised           Recent Outpatient Visits              1 month ago Sore throat    [address], [address] [facility] [address], APRN    Office Visit    1 month ago Nephrolithiasis    [address] [place], [address], Vladimir Parks MD    Office Visit    3 months ago Right ureteral stone    [address], Vladimir Parks MD    Office Visit    8 months ago Gross hematuria    [address], Vladimir Parks MD    Office Visit    9 months ago Merlin Bella, APRN    Office Visit

## 2023-07-15 ENCOUNTER — OFFICE VISIT (OUTPATIENT)
Dept: FAMILY MEDICINE CLINIC | Facility: CLINIC | Age: 63
End: 2023-07-15

## 2023-07-15 VITALS
WEIGHT: 190 LBS | SYSTOLIC BLOOD PRESSURE: 131 MMHG | HEART RATE: 79 BPM | BODY MASS INDEX: 25.73 KG/M2 | HEIGHT: 72 IN | DIASTOLIC BLOOD PRESSURE: 83 MMHG

## 2023-07-15 DIAGNOSIS — I65.23 CAROTID ATHEROSCLEROSIS, BILATERAL: ICD-10-CM

## 2023-07-15 DIAGNOSIS — I10 ESSENTIAL HYPERTENSION: ICD-10-CM

## 2023-07-15 DIAGNOSIS — L84 FOOT CALLUS: ICD-10-CM

## 2023-07-15 DIAGNOSIS — Z87.438 HISTORY OF BPH: ICD-10-CM

## 2023-07-15 DIAGNOSIS — Z00.00 WELL ADULT EXAM: ICD-10-CM

## 2023-07-15 DIAGNOSIS — Z12.5 PROSTATE CANCER SCREENING: ICD-10-CM

## 2023-07-15 DIAGNOSIS — E11.9 CONTROLLED TYPE 2 DIABETES MELLITUS WITHOUT COMPLICATION, WITHOUT LONG-TERM CURRENT USE OF INSULIN (HCC): Primary | ICD-10-CM

## 2023-07-15 DIAGNOSIS — E78.00 HYPERCHOLESTEREMIA: ICD-10-CM

## 2023-07-15 RX ORDER — LISINOPRIL 40 MG/1
40 TABLET ORAL DAILY
Qty: 90 TABLET | Refills: 3 | Status: SHIPPED | OUTPATIENT
Start: 2023-07-15

## 2023-07-17 RX ORDER — LISINOPRIL 40 MG/1
40 TABLET ORAL DAILY
Qty: 90 TABLET | Refills: 3 | OUTPATIENT
Start: 2023-07-17

## 2023-07-18 NOTE — ASSESSMENT & PLAN NOTE
Continue crestor  Check lipid panel  Please aim to eat a diet high in fresh fruits and vegetables, lean protein sources, complex carbohydrates and limited processed and fast foods. Try to get at least 150 minutes of exercise per week-a combination of weight resistance and cardio is preferred.

## 2023-07-18 NOTE — ASSESSMENT & PLAN NOTE
Screening labs  Please aim to eat a diet high in fresh fruits and vegetables, lean protein sources, complex carbohydrates and limited processed and fast foods. Try to get at least 150 minutes of exercise per week-a combination of weight resistance and cardio is preferred.     Up to date colonoscopy

## 2023-07-19 ENCOUNTER — LAB ENCOUNTER (OUTPATIENT)
Dept: LAB | Age: 63
End: 2023-07-19
Attending: NURSE PRACTITIONER
Payer: COMMERCIAL

## 2023-07-19 DIAGNOSIS — E11.9 CONTROLLED TYPE 2 DIABETES MELLITUS WITHOUT COMPLICATION, WITHOUT LONG-TERM CURRENT USE OF INSULIN (HCC): ICD-10-CM

## 2023-07-19 DIAGNOSIS — Z00.00 WELL ADULT EXAM: ICD-10-CM

## 2023-07-19 LAB
ALBUMIN SERPL-MCNC: 4.2 G/DL (ref 3.4–5)
ALBUMIN/GLOB SERPL: 1.3 {RATIO} (ref 1–2)
ALP LIVER SERPL-CCNC: 55 U/L
ALT SERPL-CCNC: 35 U/L
ANION GAP SERPL CALC-SCNC: 7 MMOL/L (ref 0–18)
AST SERPL-CCNC: 17 U/L (ref 15–37)
BILIRUB SERPL-MCNC: 0.5 MG/DL (ref 0.1–2)
BUN BLD-MCNC: 18 MG/DL (ref 7–18)
CALCIUM BLD-MCNC: 9.6 MG/DL (ref 8.5–10.1)
CHLORIDE SERPL-SCNC: 106 MMOL/L (ref 98–112)
CHOLEST SERPL-MCNC: 115 MG/DL (ref ?–200)
CO2 SERPL-SCNC: 26 MMOL/L (ref 21–32)
COMPLEXED PSA SERPL-MCNC: 3.61 NG/ML (ref ?–4)
CREAT BLD-MCNC: 1.14 MG/DL
CREAT UR-SCNC: 137 MG/DL
ERYTHROCYTE [DISTWIDTH] IN BLOOD BY AUTOMATED COUNT: 13.9 %
EST. AVERAGE GLUCOSE BLD GHB EST-MCNC: 151 MG/DL (ref 68–126)
FASTING PATIENT LIPID ANSWER: NO
FASTING STATUS PATIENT QL REPORTED: NO
GFR SERPLBLD BASED ON 1.73 SQ M-ARVRAT: 72 ML/MIN/1.73M2 (ref 60–?)
GLOBULIN PLAS-MCNC: 3.3 G/DL (ref 2.8–4.4)
GLUCOSE BLD-MCNC: 129 MG/DL (ref 70–99)
HBA1C MFR BLD: 6.9 % (ref ?–5.7)
HCT VFR BLD AUTO: 45.9 %
HDLC SERPL-MCNC: 77 MG/DL (ref 40–59)
HGB BLD-MCNC: 15 G/DL
LDLC SERPL CALC-MCNC: 21 MG/DL (ref ?–100)
MCH RBC QN AUTO: 28.8 PG (ref 26–34)
MCHC RBC AUTO-ENTMCNC: 32.7 G/DL (ref 31–37)
MCV RBC AUTO: 88.1 FL
MICROALBUMIN UR-MCNC: 2.37 MG/DL
MICROALBUMIN/CREAT 24H UR-RTO: 17.3 UG/MG (ref ?–30)
NONHDLC SERPL-MCNC: 38 MG/DL (ref ?–130)
OSMOLALITY SERPL CALC.SUM OF ELEC: 292 MOSM/KG (ref 275–295)
PLATELET # BLD AUTO: 271 10(3)UL (ref 150–450)
POTASSIUM SERPL-SCNC: 4.8 MMOL/L (ref 3.5–5.1)
PROT SERPL-MCNC: 7.5 G/DL (ref 6.4–8.2)
RBC # BLD AUTO: 5.21 X10(6)UL
SODIUM SERPL-SCNC: 139 MMOL/L (ref 136–145)
TRIGL SERPL-MCNC: 87 MG/DL (ref 30–149)
TSI SER-ACNC: 1.97 MIU/ML (ref 0.36–3.74)
VIT B12 SERPL-MCNC: 314 PG/ML (ref 193–986)
VIT D+METAB SERPL-MCNC: 30.4 NG/ML (ref 30–100)
VLDLC SERPL CALC-MCNC: 11 MG/DL (ref 0–30)
WBC # BLD AUTO: 7.4 X10(3) UL (ref 4–11)

## 2023-07-19 PROCEDURE — 85027 COMPLETE CBC AUTOMATED: CPT

## 2023-07-19 PROCEDURE — 80061 LIPID PANEL: CPT

## 2023-07-19 PROCEDURE — 84443 ASSAY THYROID STIM HORMONE: CPT

## 2023-07-19 PROCEDURE — 82306 VITAMIN D 25 HYDROXY: CPT

## 2023-07-19 PROCEDURE — 83036 HEMOGLOBIN GLYCOSYLATED A1C: CPT

## 2023-07-19 PROCEDURE — 82043 UR ALBUMIN QUANTITATIVE: CPT

## 2023-07-19 PROCEDURE — 36415 COLL VENOUS BLD VENIPUNCTURE: CPT

## 2023-07-19 PROCEDURE — 82607 VITAMIN B-12: CPT

## 2023-07-19 PROCEDURE — 82570 ASSAY OF URINE CREATININE: CPT

## 2023-07-19 PROCEDURE — 80053 COMPREHEN METABOLIC PANEL: CPT

## 2023-07-21 ENCOUNTER — OFFICE VISIT (OUTPATIENT)
Dept: FAMILY MEDICINE CLINIC | Facility: CLINIC | Age: 63
End: 2023-07-21

## 2023-07-21 VITALS
DIASTOLIC BLOOD PRESSURE: 83 MMHG | WEIGHT: 195 LBS | HEART RATE: 99 BPM | HEIGHT: 72 IN | TEMPERATURE: 97 F | SYSTOLIC BLOOD PRESSURE: 127 MMHG | BODY MASS INDEX: 26.41 KG/M2

## 2023-07-21 DIAGNOSIS — I10 ESSENTIAL HYPERTENSION: ICD-10-CM

## 2023-07-21 DIAGNOSIS — E11.9 CONTROLLED TYPE 2 DIABETES MELLITUS WITHOUT COMPLICATION, WITHOUT LONG-TERM CURRENT USE OF INSULIN (HCC): Primary | ICD-10-CM

## 2023-07-21 DIAGNOSIS — E78.2 MIXED HYPERLIPIDEMIA: ICD-10-CM

## 2023-07-24 ENCOUNTER — TELEPHONE (OUTPATIENT)
Dept: SURGERY | Facility: CLINIC | Age: 63
End: 2023-07-24

## 2023-07-24 DIAGNOSIS — R97.20 ELEVATED PSA: Primary | ICD-10-CM

## 2023-07-24 NOTE — TELEPHONE ENCOUNTER
Patient requesting call back with PSA results done at PCP office. States they went up and  would like to know if he should be seen.  Please advise

## 2023-07-26 NOTE — TELEPHONE ENCOUNTER
-s/w pt; identity verified with name and   - Read AR message to pt, and pt acknowledged understanding of all and agreed to follow-up with his PCP and have his PSA test done prior to the end of this year. Pt acknowledged understanding of instructions prior to PSA collection, as well as being sure to contact us for f/u if his PSA exceeds 4.    - encounter shamir Bowman MD   to Jenaro Ramey, [facility]     23  1:11 PM  Can you let him know that he can check his Psa again in 3-6 months with us or with his PCP, I can place order. We should make an appointment to see us if he chooses to do with us. He should increase hydration, avoid sex, avoid bike riding for 3 days prior to his PSA collection    If he does with his PCP if Psa >4 he should see us.

## 2023-08-02 ENCOUNTER — MED REC SCAN ONLY (OUTPATIENT)
Dept: FAMILY MEDICINE CLINIC | Facility: CLINIC | Age: 63
End: 2023-08-02

## 2023-08-02 NOTE — TELEPHONE ENCOUNTER
Spoke to [employer] from [facility]. Rx was received on 07/15 and was picked up by patient on 07/16. Pt's insurance only allows a 30 day supply.

## 2023-10-19 RX ORDER — AMLODIPINE BESYLATE 5 MG/1
5 TABLET ORAL DAILY
Qty: 90 TABLET | Refills: 3 | Status: SHIPPED | OUTPATIENT
Start: 2023-10-19

## 2023-10-19 NOTE — TELEPHONE ENCOUNTER
Refill passed per [employer] protocol. Requested Prescriptions   Pending Prescriptions Disp Refills    AMLODIPINE 5 MG Oral Tab [Pharmacy Med Name: Amlodipine Besylate 5 Mg Tab Unic] 90 tablet 0     Sig: Take 1 tablet (5 mg total) by mouth daily.        Hypertensive Medications Protocol Passed - 10/19/2023  1:30 AM        Passed - In person appointment in the past 12 or next 3 months     Recent Outpatient Visits              3 months ago Controlled type 2 diabetes mellitus without complication, without long-term current use of insulin ([facility] [address]    [address], [address], Merlin Crystal, DO    Office Visit    3 months ago Controlled type 2 diabetes mellitus without complication, without long-term current use of insulin ([facility] [address]    [address], [address], Merlin Vernon, APRN    Office Visit    4 months ago Sore throat    [address], [address], [address] [place] [address] Andria Fernández, APRN    Office Visit    5 months ago Nephrolithiasis    [address], [address], Rahat Ho MD    Office Visit    7 months ago Right ureteral stone    [facility], [address], Rahat Ho MD    Office Visit                      Passed - Last BP reading less than 140/90     BP Readings from Last 1 Encounters:  07/21/23 : 127/83              Passed - CMP or BMP in past 6 months     Recent Results (from the past 4392 hour(s))   Comp Metabolic Panel (14)    Collection Time: 07/19/23  7:47 AM   Result Value Ref Range    Glucose 129 (H) 70 - 99 mg/dL    Sodium 139 136 - 145 mmol/L    Potassium 4.8 3.5 - 5.1 mmol/L    Chloride 106 98 - 112 mmol/L    CO2 26.0 21.0 - 32.0 mmol/L    Anion Gap 7 0 - 18 mmol/L    BUN 18 7 - 18 mg/dL    Creatinine 1.14 0.70 - 1.30 mg/dL    Calcium, Total 9.6 8.5 - 10.1 mg/dL    Calculated Osmolality 292 275 - 295 mOsm/kg    eGFR-Cr 72 >=60 mL/min/1.73m2    AST 17 15 - 37 U/L    ALT 35 16 - 61 U/L    Alkaline Phosphatase 55 45 - 117 U/L    Bilirubin, Total 0.5 0.1 - 2.0 mg/dL    Total Protein 7.5 6.4 - 8.2 g/dL    Albumin 4.2 3.4 - 5.0 g/dL    Globulin  3.3 2.8 - 4.4 g/dL    A/G Ratio 1.3 1.0 - 2.0    Patient Fasting for CMP? No      *Note: Due to a large number of results and/or encounters for the requested time period, some results have not been displayed. A complete set of results can be found in Results Review.                Passed - In person appointment or virtual visit in the past 6 months     Recent Outpatient Visits              3 months ago Controlled type 2 diabetes mellitus without complication, without long-term current use of insulin ([address]    Destini Cameron, Merlin Bazan, DO    Office Visit    3 months ago Controlled type 2 diabetes mellitus without complication, without long-term current use of insulin ([address]    Merlin Tello, APRN    Office Visit    4 months ago Sore throat    [facility], [address] [place] [address] Andria Fernández, APRN    Office Visit    5 months ago Nephrolithiasis    [facility], [address] [facility],[address], Perri Cordova MD    Office Visit    7 months ago Right ureteral stone    Perri Tello MD    Office Visit                      Passed - EGFRCR or GFRNAA > 50     GFR Evaluation  EGFRCR: 72 , resulted on 7/19/2023                     Recent Outpatient Visits              3 months ago Controlled type 2 diabetes mellitus without complication, without long-term current use of insulin ([address]    Destini Cameron, Merlin Lalat, DO    Office Visit    3 months ago Controlled type 2 diabetes mellitus without complication, without long-term current use of insulin ([address]    Destini Cameron, Merlin Dooley, APRN    Office Visit    4 months ago Sore throat    [address] [facility], [address] [facility]    Office Visit    5 months ago Nephrolithiasis    [address] [facility], Sondra Mckeon MD    Office Visit    7 months ago Right ureteral stone    [address] [facility], Sondra Mckeon MD    Office Visit

## 2023-10-21 NOTE — TELEPHONE ENCOUNTER
Pharmacy is requesting 90 day       lisinopril 40 MG Oral Tab, Take 1 tablet (40 mg total) by mouth daily. , Disp: 90 tablet, Rfl: 3

## 2023-10-23 RX ORDER — LISINOPRIL 40 MG/1
40 TABLET ORAL DAILY
Qty: 90 TABLET | Refills: 3 | OUTPATIENT
Start: 2023-10-23

## 2023-10-23 NOTE — TELEPHONE ENCOUNTER
Refill passed per [facility] protocol. Requested Prescriptions   Pending Prescriptions Disp Refills    METFORMIN HCL 1000 MG Oral Tab [Pharmacy Med Name: Metformin Hydrochloride 1,000 Mg Tab Auro] 180 tablet 0     Sig: Take 1 tablet by mouth 2 times daily with meals.        Diabetes Medication Protocol Passed - 10/22/2023  1:11 PM        Passed - Last A1C < 7.5 and within past 6 months     Lab Results   Component Value Date    A1C 6.9 (H) 07/19/2023             Passed - In person appointment or virtual visit in the past 6 mos or appointment in next 3 mos     Recent Outpatient Visits              3 months ago Controlled type 2 diabetes mellitus without complication, without long-term current use of insulin ([facility] [address]    [address], [address], Merlin Rouse, DO    Office Visit    3 months ago Controlled type 2 diabetes mellitus without complication, without long-term current use of insulin ([facility] [address]    [address], [address], Merlin Briseno, APRN    Office Visit    4 months ago Sore throat    [address] [facility], [address] [place] [address] Andria Fernández, APRN    Office Visit    5 months ago Nephrolithiasis    [address], [facility], Karina Serrano MD    Office Visit    7 months ago Right ureteral stone    [address] [facility], Karina Serrano MD    Office Visit                      Passed - EGFRCR or GFRNAA > 50     GFR Evaluation  EGFRCR: 72 , resulted on 7/19/2023          Passed - GFR in the past 12 months           Recent Outpatient Visits              3 months ago Controlled type 2 diabetes mellitus without complication, without long-term current use of insulin ([facility] [address]    [address], [address], Merlin Rouse, DO    Office Visit    3 months ago Controlled type 2 diabetes mellitus without complication, without long-term current use of insulin ([address] [facility] VIKTORIYA Avila    Office Visit    4 months ago Sore throat    [address] [place], [address] [place] [address], APR    Office Visit    5 months ago Nephrolithiasis    [address] [place]    Office Visit    7 months ago Right ureteral stone    [address], Joanie Fritz MD    Office Visit

## 2023-10-24 NOTE — TELEPHONE ENCOUNTER
Patient calling to inform that he has changed pharmacies to [facility] home delivery, requesting refills of:  Glucose Blood (FREESTYLE LITE TEST) In Vitro Strip   FreeStyle Lancets Does not apply Misc   lisinopril 40 MG Oral Tab    amLODIPine 5 MG Oral Tab   metFORMIN HCl 1000 MG Oral Tab   finasteride 5 MG Oral Tab   rosuvastatin 40 MG Oral Tab

## 2023-10-25 RX ORDER — LANCETS 28 GAUGE
EACH MISCELLANEOUS
Qty: 100 EACH | Refills: 3 | Status: SHIPPED | OUTPATIENT
Start: 2023-10-25

## 2023-10-25 RX ORDER — AMLODIPINE BESYLATE 5 MG/1
5 TABLET ORAL DAILY
Qty: 90 TABLET | Refills: 3 | Status: SHIPPED | OUTPATIENT
Start: 2023-10-25

## 2023-10-25 RX ORDER — ROSUVASTATIN CALCIUM 40 MG/1
40 TABLET, COATED ORAL NIGHTLY
Qty: 90 TABLET | Refills: 3 | Status: SHIPPED | OUTPATIENT
Start: 2023-10-25

## 2023-10-25 RX ORDER — LISINOPRIL 40 MG/1
40 TABLET ORAL DAILY
Qty: 90 TABLET | Refills: 3 | Status: SHIPPED | OUTPATIENT
Start: 2023-10-25

## 2023-10-25 RX ORDER — BLOOD-GLUCOSE METER
1 KIT MISCELLANEOUS DAILY
Qty: 100 STRIP | Refills: 3 | Status: SHIPPED | OUTPATIENT
Start: 2023-10-25

## 2023-10-25 RX ORDER — FINASTERIDE 5 MG/1
5 TABLET, FILM COATED ORAL DAILY
Qty: 90 TABLET | Refills: 3 | Status: SHIPPED | OUTPATIENT
Start: 2023-10-25

## 2023-10-25 NOTE — TELEPHONE ENCOUNTER
Refill passed per [facility] protocol. Requested Prescriptions   Pending Prescriptions Disp Refills    Glucose Blood (FREESTYLE LITE TEST) In Vitro Strip 100 strip 3     Si strip by Other route daily.  Check blood sugar daily       Diabetic Supplies Protocol Passed - 10/24/2023 10:26 AM        Passed - In person appointment or virtual visit in the past 12 mos or appointment in next 3 mos     Recent Outpatient Visits              3 months ago Controlled type 2 diabetes mellitus without complication, without long-term current use of insulin ([facility] [address]    [address], [address], Merlin Huddleston, DO    Office Visit    3 months ago Controlled type 2 diabetes mellitus without complication, without long-term current use of insulin ([facility] [address]    [address], [address], Merlin Carpenter, APRN    Office Visit    4 months ago Sore throat    [address] [facility], [address] [place] [address] Mich Fernández, APRN    Office Visit    5 months ago Nephrolithiasis    [address], [facility], Moira Pruitt MD    Office Visit    7 months ago Right ureteral stone    [address] [facility], Moira Pruitt MD    Office Visit                        FreeStyle Lancets Does not apply Misc 100 each 3     Sig: USE 1  TO CHECK GLUCOSE ONCE DAILY       Diabetic Supplies Protocol Passed - 10/24/2023 10:26 AM        Passed - In person appointment or virtual visit in the past 12 mos or appointment in next 3 mos     Recent Outpatient Visits              3 months ago Controlled type 2 diabetes mellitus without complication, without long-term current use of insulin ([address]    [address], [address], Merlin Huddleston, DO    Office Visit    3 months ago Controlled type 2 diabetes mellitus without complication, without long-term current use of insulin ([address]    [address], [address], Merlin Ryan Low, APRN    Office Visit    4 months ago Sore throat    [address] [facility], [address] [place] [address] Andria Fernández, APRN    Office Visit    5 months ago Nephrolithiasis    [address] [facility], Jenaro Romero MD    Office Visit    7 months ago Right ureteral stone    [address] [facility], Jenaro Romero MD    Office Visit                        lisinopril 40 MG Oral Tab 90 tablet 3     Sig: Take 1 tablet (40 mg total) by mouth daily.        Hypertensive Medications Protocol Passed - 10/24/2023 10:26 AM        Passed - In person appointment in the past 12 or next 3 months     Recent Outpatient Visits              3 months ago Controlled type 2 diabetes mellitus without complication, without long-term current use of insulin ([address]    [address] [facility], Merlin Razo, DO    Office Visit    3 months ago Controlled type 2 diabetes mellitus without complication, without long-term current use of insulin ([address]    [address], [facility], Merlin Ryan Low, APRN    Office Visit    4 months ago Sore throat    [address] [facility], [facility], APRN    Office Visit    5 months ago Nephrolithiasis    [address] [facility], Jenaro Romero MD    Office Visit    7 months ago Right ureteral stone    [address] [facility], Jenaro Romero MD    Office Visit                      Passed - Last BP reading less than 140/90     BP Readings from Last 1 Encounters:  07/21/23 : 127/83              Passed - CMP or BMP in past 6 months     Recent Results (from the past 4392 hour(s))   Comp Metabolic Panel (14)    Collection Time: 07/19/23  7:47 AM   Result Value Ref Range    Glucose 129 (H) 70 - 99 mg/dL    Sodium 139 136 - 145 mmol/L    Potassium 4.8 3.5 - 5.1 mmol/L    Chloride 106 98 - 112 mmol/L    CO2 26.0 21.0 - 32.0 mmol/L    Anion Gap 7 0 - 18 mmol/L    BUN 18 7 - 18 mg/dL    Creatinine 1.14 0.70 - 1.30 mg/dL    Calcium, Total 9.6 8.5 - 10.1 mg/dL    Calculated Osmolality 292 275 - 295 mOsm/kg    eGFR-Cr 72 >=60 mL/min/1.73m2    AST 17 15 - 37 U/L    ALT 35 16 - 61 U/L    Alkaline Phosphatase 55 45 - 117 U/L    Bilirubin, Total 0.5 0.1 - 2.0 mg/dL    Total Protein 7.5 6.4 - 8.2 g/dL    Albumin 4.2 3.4 - 5.0 g/dL    Globulin  3.3 2.8 - 4.4 g/dL    A/G Ratio 1.3 1.0 - 2.0    Patient Fasting for CMP? No      *Note: Due to a large number of results and/or encounters for the requested time period, some results have not been displayed. A complete set of results can be found in Results Review. Passed - In person appointment or virtual visit in the past 6 months     Recent Outpatient Visits              3 months ago Controlled type 2 diabetes mellitus without complication, without long-term current use of insulin ([facility] [address]    [address], [address], Merlin Shabazz DO    Office Visit    3 months ago Controlled type 2 diabetes mellitus without complication, without long-term current use of insulin ([facility] [address]    [address], [address], VIKTORIYA Malik    Office Visit    4 months ago Sore throat    [address] [facility], [address] [facility], APRN    Office Visit    5 months ago Nephrolithiasis    [address] [facility], Dejon Sotomayor MD    Office Visit    7 months ago Right ureteral stone    [address] [facility], Dejon Sotomayor MD    Office Visit                      Passed - EGFRCR or GFRNAA > 50     GFR Evaluation  EGFRCR: 72 , resulted on 7/19/2023            amLODIPine 5 MG Oral Tab 90 tablet 3     Sig: Take 1 tablet (5 mg total) by mouth daily.        Hypertensive Medications Protocol Passed - 10/24/2023 10:26 AM        Passed - In person appointment in the past 12 or next 3 months     Recent Outpatient Visits              3 months ago Controlled type 2 diabetes mellitus without complication, without long-term current use of insulin ([facility] [address]    [address], [facility], Merlin Wilkes Fails, DO    Office Visit    3 months ago Controlled type 2 diabetes mellitus without complication, without long-term current use of insulin ([address]    [address], Merlin Serna, APRN    Office Visit    4 months ago Sore throat    [facility], [facility], [address] [place] [address] Andria Fernández, APRN    Office Visit    5 months ago Nephrolithiasis    Nicki Nation, [address], Clemente Choi MD    Office Visit    7 months ago Right ureteral stone    Clemente Munoz MD    Office Visit                      Passed - Last BP reading less than 140/90     BP Readings from Last 1 Encounters:  07/21/23 : 127/83              Passed - CMP or BMP in past 6 months     Recent Results (from the past 4392 hour(s))   Comp Metabolic Panel (14)    Collection Time: 07/19/23  7:47 AM   Result Value Ref Range    Glucose 129 (H) 70 - 99 mg/dL    Sodium 139 136 - 145 mmol/L    Potassium 4.8 3.5 - 5.1 mmol/L    Chloride 106 98 - 112 mmol/L    CO2 26.0 21.0 - 32.0 mmol/L    Anion Gap 7 0 - 18 mmol/L    BUN 18 7 - 18 mg/dL    Creatinine 1.14 0.70 - 1.30 mg/dL    Calcium, Total 9.6 8.5 - 10.1 mg/dL    Calculated Osmolality 292 275 - 295 mOsm/kg    eGFR-Cr 72 >=60 mL/min/1.73m2    AST 17 15 - 37 U/L    ALT 35 16 - 61 U/L    Alkaline Phosphatase 55 45 - 117 U/L    Bilirubin, Total 0.5 0.1 - 2.0 mg/dL    Total Protein 7.5 6.4 - 8.2 g/dL    Albumin 4.2 3.4 - 5.0 g/dL    Globulin  3.3 2.8 - 4.4 g/dL    A/G Ratio 1.3 1.0 - 2.0    Patient Fasting for CMP?  No      *Note: Due to a large number of results and/or encounters for the requested time period, some results have not been displayed. A complete set of results can be found in Results Review. Passed - In person appointment or virtual visit in the past 6 months     Recent Outpatient Visits              3 months ago Controlled type 2 diabetes mellitus without complication, without long-term current use of insulin ([address]    [address], [address], Merlin Corea, DO    Office Visit    3 months ago Controlled type 2 diabetes mellitus without complication, without long-term current use of insulin ([address]    [address], [address], Merlin Loomis Patient, APRN    Office Visit    4 months ago Sore throat    [address], [address], [address], APRN    Office Visit    5 months ago Nephrolithiasis    [address], Geovanna Shabazz MD    Office Visit    7 months ago Right ureteral stone    [facility], Geovanna Shabazz MD    Office Visit                      Passed - EGFRCR or GFRNAA > 50     GFR Evaluation  EGFRCR: 72 , resulted on 7/19/2023            metFORMIN HCl 1000 MG Oral Tab 180 tablet 3     Sig: Take 1 tablet (1,000 mg total) by mouth 2 (two) times daily with meals.        Diabetes Medication Protocol Passed - 10/24/2023 10:26 AM        Passed - Last A1C < 7.5 and within past 6 months     Lab Results   Component Value Date    A1C 6.9 (H) 07/19/2023             Passed - In person appointment or virtual visit in the past 6 mos or appointment in next 3 mos     Recent Outpatient Visits              3 months ago Controlled type 2 diabetes mellitus without complication, without long-term current use of insulin ([address]    [address], [address], Merlin Corea, DO    Office Visit    3 months ago Controlled type 2 diabetes mellitus without complication, without long-term current use of insulin ([address]    [facility], [address], Merlin Andrea, APRCAIN    Office Visit    4 months ago Sore throat    [address] [facility], [address], APRN    Office Visit    5 months ago Nephrolithiasis    [address] [facility], Vladimir Parks MD    Office Visit    7 months ago Right ureteral stone    Jaylin Velázquez MD    Office Visit                      Passed - EGFRCR or GFRNAA > 50     GFR Evaluation  EGFRCR: 72 , resulted on 7/19/2023          Passed - GFR in the past 12 months          finasteride 5 MG Oral Tab 90 tablet 3     Sig: Take 1 tablet (5 mg total) by mouth daily. Genitourinary Medications Passed - 10/24/2023 10:26 AM        Passed - Patient does not have pulmonary hypertension on problem list        Passed - In person appointment or virtual visit in the past 12 mos or appointment in next 3 mos     Recent Outpatient Visits              3 months ago Controlled type 2 diabetes mellitus without complication, without long-term current use of insulin ([address]    [place] Fern[place], Merlin Velasquez DO    Office Visit    3 months ago Controlled type 2 diabetes mellitus without complication, without long-term current use of insulin ([address]    [address] [facility], Merlin Andrea, VIKTORIYA    Office Visit    4 months ago Sore throat    [place] Fernandez, [address] [place], APRN    Office Visit    5 months ago Nephrolithiasis    D[address] Bueno, [facility], Vladimir Parks MD    Office Visit    7 months ago Right ureteral stone    Jaylin Velázquez MD    Office Visit                        rosuvastatin 40 MG Oral Tab 90 tablet 3     Sig: Take 1 tablet (40 mg total) by mouth nightly.        Cholesterol Medication Protocol Passed - 10/24/2023 10:26 AM        Passed - ALT in past 12 months        Passed - LDL in past 12 months        Passed - Last ALT < 80     Lab Results   Component Value Date    ALT 35 07/19/2023             Passed - Last LDL < 130     Lab Results   Component Value Date    LDL 21 07/19/2023             Passed - In person appointment or virtual visit in the past 12 mos or appointment in next 3 mos     Recent Outpatient Visits              3 months ago Controlled type 2 diabetes mellitus without complication, without long-term current use of insulin ([address]    [address], [address], Merlin Holman, DO    Office Visit    3 months ago Controlled type 2 diabetes mellitus without complication, without long-term current use of insulin ([address]    [address], Merlin Hortensia Ket, APRN    Office Visit    4 months ago Sore throat    [address], [address], [address] [place] [address] Andria Fernández, APRN    Office Visit    5 months ago Nephrolithiasis    [facility], [address] [facility] [address], Sondra Mckeon MD    Office Visit    7 months ago Right ureteral stone    [address], Sondra Mckeon MD    Office Visit                         Recent Outpatient Visits              3 months ago Controlled type 2 diabetes mellitus without complication, without long-term current use of insulin ([address]    [address], Merlin Holman, DO    Office Visit    3 months ago Controlled type 2 diabetes mellitus without complication, without long-term current use of insulin ([address]    [address], [address], [place] Hortensia Ket, APRN    Office Visit    4 months ago Sore throat    [address] [facility], [address], APRN    Office Visit    5 months ago Nephrolithiasis    [address] [facility], Sondra Mckeon MD Office Visit    7 months ago Right ureteral stone    Rodrigo Espinoza MD    Office Visit

## 2024-01-23 ENCOUNTER — TELEPHONE (OUTPATIENT)
Dept: FAMILY MEDICINE CLINIC | Facility: CLINIC | Age: 64
End: 2024-01-23

## 2024-01-23 DIAGNOSIS — I65.23 CAROTID ATHEROSCLEROSIS, BILATERAL: Primary | ICD-10-CM

## 2024-01-23 DIAGNOSIS — E11.9 CONTROLLED TYPE 2 DIABETES MELLITUS WITHOUT COMPLICATION, WITHOUT LONG-TERM CURRENT USE OF INSULIN (HCC): ICD-10-CM

## 2024-01-23 DIAGNOSIS — Z87.438 HISTORY OF BPH: ICD-10-CM

## 2024-01-23 DIAGNOSIS — E78.00 HYPERCHOLESTEREMIA: ICD-10-CM

## 2024-01-23 DIAGNOSIS — Z00.00 WELL ADULT EXAM: ICD-10-CM

## 2024-01-23 DIAGNOSIS — Z12.5 PROSTATE CANCER SCREENING: ICD-10-CM

## 2024-01-23 DIAGNOSIS — I10 ESSENTIAL HYPERTENSION: ICD-10-CM

## 2024-01-23 NOTE — TELEPHONE ENCOUNTER
Patient asking for PSA test and annual labs be entered ahead of his 2-9-24 px w/ SOHAN Mccarty.    Call pt when entered.

## 2024-01-24 NOTE — TELEPHONE ENCOUNTER
Full name and  verified with patient.     Patient was informed of below message. Pt did not have any further questions.

## 2024-01-26 ENCOUNTER — HOSPITAL ENCOUNTER (OUTPATIENT)
Age: 64
Discharge: HOME | End: 2024-01-26
Payer: COMMERCIAL

## 2024-01-26 ENCOUNTER — NURSE TRIAGE (OUTPATIENT)
Dept: FAMILY MEDICINE CLINIC | Facility: CLINIC | Age: 64
End: 2024-01-26

## 2024-01-26 VITALS
RESPIRATION RATE: 18 BRPM | DIASTOLIC BLOOD PRESSURE: 71 MMHG | HEART RATE: 84 BPM | TEMPERATURE: 97 F | OXYGEN SATURATION: 98 % | SYSTOLIC BLOOD PRESSURE: 142 MMHG

## 2024-01-26 DIAGNOSIS — L03.211 CELLULITIS OF FACE: ICD-10-CM

## 2024-01-26 DIAGNOSIS — L98.9 LESION OF SKIN OF FACE: Primary | ICD-10-CM

## 2024-01-26 RX ORDER — CEFADROXIL 500 MG/1
500 CAPSULE ORAL 2 TIMES DAILY
Qty: 20 CAPSULE | Refills: 0 | Status: SHIPPED | OUTPATIENT
Start: 2024-01-26 | End: 2024-02-05

## 2024-01-26 NOTE — ED PROVIDER NOTES
Patient Seen in: [facility]      History     Chief Complaint   Patient presents with    Rash Skin Problem    Abscess     Stated Complaint: skin issue - face    Subjective:   HPI    This is a 63-year-old male presenting with a bump on his right side cheek.  Patient states Monday he noticed a bump on his cheek that he thought was a pimple and he tried to open it up now has redness and there is more swelling in the area so came to be evaluated.  Denies any drainage or itchiness fevers or chills injury or trauma.    Objective:   No pertinent past medical history.            No pertinent past surgical history.              No pertinent social history.            Review of Systems    Positive for stated complaint: skin issue - face  Other systems are as noted in HPI.  Constitutional and vital signs reviewed.      All other systems reviewed and negative except as noted above.    Physical Exam     ED Triage Vitals [01/26/24 1505]   /71   Pulse 84   Resp 18   Temp 97.3 °F (36.3 °C)   Temp src Oral   SpO2 98 %   O2 Device None (Room air)       Current:/71   Pulse 84   Temp 97.3 °F (36.3 °C) (Oral)   Resp 18   SpO2 98%         Physical Exam  Vitals and nursing note reviewed.   Constitutional:       Appearance: Normal appearance.   HENT:      Head:        Right Ear: External ear normal.      Left Ear: External ear normal.      Nose: Nose normal.      Mouth/Throat:      Mouth: Mucous membranes are moist.      Pharynx: Oropharynx is clear.   Eyes:      Extraocular Movements: Extraocular movements intact.      Conjunctiva/sclera: Conjunctivae normal.      Pupils: Pupils are equal, round, and reactive to light.   Musculoskeletal:         General: Normal range of motion.      Cervical back: Normal range of motion.   Skin:     General: Skin is warm.   Neurological:      Mental Status: He is alert and oriented to person, place, and time.               ED Course   Labs Reviewed - No data to display                    MDM          Medical Decision Making  63-year-old male well-appearing and nontoxic with a bump on his right side cheek region.  Concern for a cyst physical exam does not correlate with an abscess as there is no fluctuance or induration but also concern for an early cellulitis.  No clinical indication for labs or imaging.  Discussed with the patient treatment with Duricef and following up with the dermatologist per the patient he has seen Dr. Mcfarland in the past recommend that he call to follow-up as it is possibly a cyst beneath the skin and given the location would not attempt incision and drainage.  Discussed washing the face is normal.  Discussed strict ER precautions with any new or worsening symptoms.  Patient feels comfortable with the plan of care and acknowledges understanding instructions.    Problems Addressed:  Cellulitis of face: acute illness or injury  Lesion of skin of face: acute illness or injury    Risk  OTC drugs.  Prescription drug management.        Disposition and Plan     Clinical Impression:  1. Lesion of skin of face    2. Cellulitis of face         Disposition:  Discharge  1/26/2024  3:10 pm    Follow-up:  Marcin Mcfarland MD  [facility] [address] [address] [phone]    Call             Medications Prescribed:  Discharge Medication List as of 1/26/2024  3:10 PM        START taking these medications    Details   cefadroxil 500 MG Oral Cap Take 1 capsule (500 mg total) by mouth 2 (two) times daily for 10 days., Normal, Disp-20 capsule, R-0

## 2024-01-26 NOTE — DISCHARGE INSTRUCTIONS
Start the antibiotic as prescribed take it with over-the-counter probiotic or eat yogurt as this antibiotic can cause upset stomach and diarrhea.  Continue to wash your face as normal.  Do not pick at your skin or try to pop any pimples on the face or anywhere else on the body.  Call to set up an appointment with the dermatologist.  If you develop increasing swelling more redness is spreading across the cheek or the face any visual problems blurry vision double vision or vision changes any fever headache nausea or vomiting or any new or worsening symptoms go to the nearest emergency department.

## 2024-01-26 NOTE — ED INITIAL ASSESSMENT (HPI)
Presents to IC for red bump on cheek that pt picked at on Monday, states started off as a pimple now feels like redness is getting worse. .denies any drainage and itching denies fever

## 2024-01-26 NOTE — TELEPHONE ENCOUNTER
Action Requested: Summary for Provider     []  Critical Lab, Recommendations Needed  [] Need Additional Advice  []   FYI    []   Need Orders  [] Need Medications Sent to Pharmacy  []  Other     SUMMARY: Patient call stated has a dime size knot on the right outer side of eye by cheek bone warm to the touch red  and sore    Advised patient no appointment available    Will proceed to UC/walk in clinic    Reason for call: Acute  Onset: Data Unavailable

## 2024-02-03 ENCOUNTER — LAB ENCOUNTER (OUTPATIENT)
Dept: LAB | Age: 64
End: 2024-02-03
Attending: NURSE PRACTITIONER
Payer: COMMERCIAL

## 2024-02-03 DIAGNOSIS — E11.9 CONTROLLED TYPE 2 DIABETES MELLITUS WITHOUT COMPLICATION, WITHOUT LONG-TERM CURRENT USE OF INSULIN (HCC): ICD-10-CM

## 2024-02-03 DIAGNOSIS — Z00.00 WELL ADULT EXAM: ICD-10-CM

## 2024-02-03 DIAGNOSIS — I10 ESSENTIAL HYPERTENSION: ICD-10-CM

## 2024-02-03 DIAGNOSIS — E78.00 HYPERCHOLESTEREMIA: ICD-10-CM

## 2024-02-03 DIAGNOSIS — Z12.5 PROSTATE CANCER SCREENING: ICD-10-CM

## 2024-02-03 DIAGNOSIS — R97.20 ELEVATED PSA: ICD-10-CM

## 2024-02-03 LAB
ALBUMIN SERPL-MCNC: 4.5 G/DL (ref 3.2–4.8)
ALBUMIN/GLOB SERPL: 1.7 {RATIO} (ref 1–2)
ALP LIVER SERPL-CCNC: 49 U/L
ALT SERPL-CCNC: 25 U/L
ANION GAP SERPL CALC-SCNC: 6 MMOL/L (ref 0–18)
AST SERPL-CCNC: 21 U/L (ref ?–34)
BILIRUB SERPL-MCNC: 0.6 MG/DL (ref 0.2–1.1)
BUN BLD-MCNC: 17 MG/DL (ref 9–23)
BUN/CREAT SERPL: 14.9 (ref 10–20)
CALCIUM BLD-MCNC: 10.2 MG/DL (ref 8.7–10.4)
CHLORIDE SERPL-SCNC: 111 MMOL/L (ref 98–112)
CHOLEST SERPL-MCNC: 97 MG/DL (ref ?–200)
CO2 SERPL-SCNC: 26 MMOL/L (ref 21–32)
COMPLEXED PSA SERPL-MCNC: 2.74 NG/ML (ref ?–4)
CREAT BLD-MCNC: 1.14 MG/DL
CREAT UR-SCNC: 130.6 MG/DL
DEPRECATED RDW RBC AUTO: 39.1 FL (ref 35.1–46.3)
EGFRCR SERPLBLD CKD-EPI 2021: 72 ML/MIN/1.73M2 (ref 60–?)
ERYTHROCYTE [DISTWIDTH] IN BLOOD BY AUTOMATED COUNT: 12.8 % (ref 11–15)
EST. AVERAGE GLUCOSE BLD GHB EST-MCNC: 146 MG/DL (ref 68–126)
FASTING PATIENT LIPID ANSWER: YES
FASTING STATUS PATIENT QL REPORTED: YES
GLOBULIN PLAS-MCNC: 2.6 G/DL (ref 2.8–4.4)
GLUCOSE BLD-MCNC: 131 MG/DL (ref 70–99)
HBA1C MFR BLD: 6.7 % (ref ?–5.7)
HCT VFR BLD AUTO: 40.4 %
HDLC SERPL-MCNC: 62 MG/DL (ref 40–59)
HGB BLD-MCNC: 14.3 G/DL
LDLC SERPL CALC-MCNC: 23 MG/DL (ref ?–100)
MCH RBC QN AUTO: 30 PG (ref 26–34)
MCHC RBC AUTO-ENTMCNC: 35.4 G/DL (ref 31–37)
MCV RBC AUTO: 84.9 FL
MICROALBUMIN UR-MCNC: 0.7 MG/DL
MICROALBUMIN/CREAT 24H UR-RTO: 5.4 UG/MG (ref ?–30)
NONHDLC SERPL-MCNC: 35 MG/DL (ref ?–130)
OSMOLALITY SERPL CALC.SUM OF ELEC: 299 MOSM/KG (ref 275–295)
PLATELET # BLD AUTO: 258 10(3)UL (ref 150–450)
POTASSIUM SERPL-SCNC: 4.8 MMOL/L (ref 3.5–5.1)
PROT SERPL-MCNC: 7.1 G/DL (ref 5.7–8.2)
PSA SERPL-MCNC: 2.74 NG/ML (ref ?–4)
RBC # BLD AUTO: 4.76 X10(6)UL
SODIUM SERPL-SCNC: 143 MMOL/L (ref 136–145)
TRIGL SERPL-MCNC: 47 MG/DL (ref 30–149)
TSI SER-ACNC: 1.23 MIU/ML (ref 0.55–4.78)
VIT B12 SERPL-MCNC: 369 PG/ML (ref 211–911)
VIT D+METAB SERPL-MCNC: 34 NG/ML (ref 30–100)
VLDLC SERPL CALC-MCNC: 6 MG/DL (ref 0–30)
WBC # BLD AUTO: 6.2 X10(3) UL (ref 4–11)

## 2024-02-03 PROCEDURE — 82570 ASSAY OF URINE CREATININE: CPT

## 2024-02-03 PROCEDURE — 82306 VITAMIN D 25 HYDROXY: CPT

## 2024-02-03 PROCEDURE — 85027 COMPLETE CBC AUTOMATED: CPT

## 2024-02-03 PROCEDURE — 80053 COMPREHEN METABOLIC PANEL: CPT

## 2024-02-03 PROCEDURE — 83036 HEMOGLOBIN GLYCOSYLATED A1C: CPT

## 2024-02-03 PROCEDURE — 84153 ASSAY OF PSA TOTAL: CPT

## 2024-02-03 PROCEDURE — 36415 COLL VENOUS BLD VENIPUNCTURE: CPT

## 2024-02-03 PROCEDURE — 82043 UR ALBUMIN QUANTITATIVE: CPT

## 2024-02-03 PROCEDURE — 82607 VITAMIN B-12: CPT

## 2024-02-03 PROCEDURE — 84443 ASSAY THYROID STIM HORMONE: CPT

## 2024-02-03 PROCEDURE — 80061 LIPID PANEL: CPT

## 2024-02-06 ENCOUNTER — TELEPHONE (OUTPATIENT)
Dept: SURGERY | Facility: CLINIC | Age: 64
End: 2024-02-06

## 2024-02-06 NOTE — TELEPHONE ENCOUNTER
----- Message from Piper Earl MD sent at 2/6/2024 10:29 AM CST -----  Hi please have him come to see me to discuss his PSA    thanksAR

## 2024-02-07 NOTE — TELEPHONE ENCOUNTER
Spoke with patient regarding below. I advised him that even though the results are lower they still need to account that patient is on finasteride. I advised him that  usually likes to discuss these results in person further. Patient states he is working and cannot schedule follow up at this time. He states he will call back to schedule.

## 2024-02-09 ENCOUNTER — OFFICE VISIT (OUTPATIENT)
Dept: FAMILY MEDICINE CLINIC | Facility: CLINIC | Age: 64
End: 2024-02-09

## 2024-02-09 VITALS
HEART RATE: 84 BPM | HEIGHT: 72 IN | BODY MASS INDEX: 27.9 KG/M2 | WEIGHT: 206 LBS | SYSTOLIC BLOOD PRESSURE: 128 MMHG | DIASTOLIC BLOOD PRESSURE: 71 MMHG

## 2024-02-09 DIAGNOSIS — Z00.00 WELL ADULT EXAM: Primary | ICD-10-CM

## 2024-02-09 DIAGNOSIS — I10 ESSENTIAL HYPERTENSION: ICD-10-CM

## 2024-02-09 DIAGNOSIS — Z87.438 HISTORY OF BPH: ICD-10-CM

## 2024-02-09 DIAGNOSIS — Z12.5 PROSTATE CANCER SCREENING: ICD-10-CM

## 2024-02-09 DIAGNOSIS — E11.9 CONTROLLED TYPE 2 DIABETES MELLITUS WITHOUT COMPLICATION, WITHOUT LONG-TERM CURRENT USE OF INSULIN (HCC): ICD-10-CM

## 2024-02-09 DIAGNOSIS — Z23 IMMUNIZATION DUE: ICD-10-CM

## 2024-02-09 DIAGNOSIS — G57.91 NEUROPATHY OF RIGHT FOOT: ICD-10-CM

## 2024-02-09 DIAGNOSIS — E78.00 HYPERCHOLESTEREMIA: ICD-10-CM

## 2024-02-09 RX ORDER — GABAPENTIN 100 MG/1
CAPSULE ORAL
Qty: 60 CAPSULE | Refills: 1 | Status: SHIPPED | OUTPATIENT
Start: 2024-02-09 | End: 2024-03-13

## 2024-02-12 NOTE — ASSESSMENT & PLAN NOTE
Continue present management  Please aim to eat a diet high in fresh fruits and vegetables, lean protein sources, complex carbohydrates and limited processed and fast foods.  Try to get at least 150 minutes of exercise per week-a combination of weight resistance and cardio is preferred.

## 2024-02-12 NOTE — ASSESSMENT & PLAN NOTE
Labs reviewed  Please aim to eat a diet high in fresh fruits and vegetables, lean protein sources, complex carbohydrates and limited processed and fast foods.  Try to get at least 150 minutes of exercise per week-a combination of weight resistance and cardio is preferred.    Up to date colon ca

## 2024-02-12 NOTE — PROGRESS NOTES
HPI  Pt here for well adult physical .   Was in UC 1/26 for cellulitis on face.      Has follow up w urology for PSA-on finasteride     Has some numbness to right 3-5 toes.     Works as a cook-on feed all day     Bs runs 120s     Diet-tries to eat healthy  Exercise-on feet all day-tries to get a little more exercise  Sleep-well rested     No nocturia  Has sm hernia left lower abd    Review of Systems   Constitutional:  Negative for activity change, appetite change and fever.   HENT:  Negative for congestion, ear pain, rhinorrhea, sore throat and trouble swallowing.    Eyes:  Negative for pain, redness and visual disturbance.   Respiratory:  Negative for cough, chest tightness, shortness of breath and wheezing.    Cardiovascular:  Negative for chest pain and palpitations.   Gastrointestinal:  Negative for abdominal distention, abdominal pain, constipation, diarrhea, nausea and vomiting.        Small left lower abd hernia   Endocrine: Negative for polydipsia and polyuria.   Genitourinary:  Negative for difficulty urinating, dysuria and frequency.        Denies nocturia     Musculoskeletal:  Negative for back pain, gait problem and myalgias.   Skin:  Negative for color change and rash.   Neurological:  Negative for dizziness, weakness and headaches.       Vitals:    02/09/24 1420   BP: 128/71   Pulse: 84   Weight: 206 lb (93.4 kg)   Height: 6' (1.829 m)     Body mass index is 27.94 kg/m².  Wt Readings from Last 6 Encounters:   02/09/24 206 lb (93.4 kg)   07/21/23 195 lb (88.5 kg)   07/15/23 190 lb (86.2 kg)   06/06/23 190 lb (86.2 kg)   12/08/22 201 lb (91.2 kg)   11/28/22 205 lb (93 kg)         Health Maintenance   Topic Date Due    COVID-19 Vaccine (4 - 2023-24 season) 09/01/2023    Influenza Vaccine (1) 10/01/2023    Pneumococcal Vaccine: Birth to 64yrs (3 of 3 - PPSV23 or PCV20) 03/30/2024    Diabetes Care Foot Exam  07/15/2024    Annual Physical  07/15/2024    Diabetes Care Dilated Eye Exam  07/31/2024    Diabetes  Care A1C  08/03/2024    Colorectal Cancer Screening  12/30/2024    Diabetes Care: GFR  02/03/2025    Diabetes Care: Microalb/Creat Ratio  02/03/2025    PSA  02/03/2026    DTaP,Tdap,and Td Vaccines (2 - Td or Tdap) 02/01/2028    Annual Depression Screening  Completed    Zoster Vaccines  Completed       Past Medical History:   Diagnosis Date    Calculus of kidney     High blood pressure     High cholesterol     Kidney stones     Other and unspecified hyperlipidemia     Prostatitis     Type II or unspecified type diabetes mellitus without mention of complication, not stated as uncontrolled     Unspecified essential hypertension        .  Past Surgical History:   Procedure Laterality Date    Appendectomy      Colonoscopy      Colonoscopy N/A 12/30/2019    Procedure: COLONOSCOPY;  Surgeon: Simon Alvarez MD;  Location: [facility] ENDOSCOPY    Hernia surgery      Thoracotomy,excis bullae      as infant for empyema       Family History   Problem Relation Age of Onset    Heart Disease Father 69        had [address] and 3 way bypass.    Cancer Father         Prostate    Heart Disease Paternal Aunt     Heart Disease Mother     Cancer Other         Prostate    Cancer Paternal Grandfather         prostate cancer       Social History     Socioeconomic History    Marital status: Single     Spouse name: Not on file    Number of children: Not on file    Years of education: Not on file    Highest education level: Not on file   Occupational History    Not on file   Tobacco Use    Smoking status: Never    Smokeless tobacco: Never   Vaping Use    Vaping Use: Never used   Substance and Sexual Activity    Alcohol use: No    Drug use: No    Sexual activity: Not on file   Other Topics Concern    Not on file   Social History Narrative    Not on file     Social Determinants of Health     Financial Resource Strain: Not on file   Food Insecurity: Not on file   Transportation Needs: Not on file   Physical Activity: Not on file   Stress: Not on file    Social Connections: Not on file   Housing Stability: Not on file       Current Outpatient Medications   Medication Sig Dispense Refill    gabapentin 100 MG Oral Cap Take 1 capsule (100 mg total) by mouth nightly for 3 days, THEN 1 capsule (100 mg total) 2 (two) times daily. 60 capsule 1    Glucose Blood (FREESTYLE LITE TEST) In Vitro Strip 1 strip by Other route daily. Check blood sugar daily 100 strip 3    FreeStyle Lancets Does not apply Misc USE 1  TO CHECK GLUCOSE ONCE DAILY 100 each 3    lisinopril 40 MG Oral Tab Take 1 tablet (40 mg total) by mouth daily. 90 tablet 3    amLODIPine 5 MG Oral Tab Take 1 tablet (5 mg total) by mouth daily. 90 tablet 3    metFORMIN HCl 1000 MG Oral Tab Take 1 tablet (1,000 mg total) by mouth 2 (two) times daily with meals. 180 tablet 3    finasteride 5 MG Oral Tab Take 1 tablet (5 mg total) by mouth daily. 90 tablet 3    rosuvastatin 40 MG Oral Tab Take 1 tablet (40 mg total) by mouth nightly. 90 tablet 3    Polyethylene Glycol 3350 (MIRALAX) 17 g Oral Powd Pack Take 17 g by mouth daily. 30 packet 0    docusate sodium 100 MG Oral Cap Take 1 capsule (100 mg total) by mouth 2 (two) times daily. 30 capsule 11    Ascorbic Acid (VITAMIN C OR) Take by mouth.      Multiple Vitamins-Minerals (MULTIVITAMIN MEN OR) Take by mouth.      aspirin 81 MG Oral Tab Take 1 tablet by mouth daily. 100 tablet 3    Coenzyme Q10 (CO Q 10 OR) Take 1 tablet by mouth daily.         Allergies:  Allergies   Allergen Reactions    Ciprofloxacin OTHER (SEE COMMENTS)     Don't remember       Physical Exam  Vitals and nursing note reviewed.   Constitutional:       General: He is not in acute distress.     Appearance: Normal appearance. He is well-developed.   HENT:      Head: Normocephalic and atraumatic.      Right Ear: Tympanic membrane, ear canal and external ear normal.      Left Ear: Tympanic membrane, ear canal and external ear normal.      Nose: No congestion or rhinorrhea.      Mouth/Throat:      Mouth:  Mucous membranes are moist.      Pharynx: Oropharynx is clear. No oropharyngeal exudate or posterior oropharyngeal erythema.   Eyes:      General:         Right eye: No discharge.         Left eye: No discharge.      Conjunctiva/sclera: Conjunctivae normal.      Pupils: Pupils are equal, round, and reactive to light.   Neck:      Thyroid: No thyromegaly.   Cardiovascular:      Rate and Rhythm: Normal rate and regular rhythm.      Heart sounds: Normal heart sounds. No murmur heard.  Pulmonary:      Effort: Pulmonary effort is normal. No respiratory distress.      Breath sounds: Normal breath sounds. No wheezing or rales.   Chest:      Chest wall: No tenderness.   Abdominal:      General: Bowel sounds are normal. There is no distension.      Palpations: Abdomen is soft.      Tenderness: There is no abdominal tenderness.   Musculoskeletal:         General: No tenderness. Normal range of motion.      Cervical back: Normal range of motion and neck supple.   Lymphadenopathy:      Cervical: No cervical adenopathy.   Skin:     General: Skin is warm and dry.      Findings: No rash.   Neurological:      Mental Status: He is alert and oriented to person, place, and time.      Coordination: Coordination normal.      Gait: Gait normal.   Psychiatric:         Behavior: Behavior normal.         Thought Content: Thought content normal.         Judgment: Judgment normal.       Component      Latest Ref Rng 2/3/2024   Glucose      70 - 99 mg/dL 131 (H)    Sodium      136 - 145 mmol/L 143    Potassium      3.5 - 5.1 mmol/L 4.8    Chloride      98 - 112 mmol/L 111    Carbon Dioxide, Total      21.0 - 32.0 mmol/L 26.0    ANION GAP      0 - 18 mmol/L 6    BUN      9 - 23 mg/dL 17    CREATININE      0.70 - 1.30 mg/dL 1.14    BUN/CREATININE RATIO      10.0 - 20.0  14.9    CALCIUM      8.7 - 10.4 mg/dL 10.2    CALCULATED OSMOLALITY      275 - 295 mOsm/kg 299 (H)    EGFR      >=60 mL/min/1.73m2 72    ALT (SGPT)      10 - 49 U/L 25    AST  (SGOT)      <=34 U/L 21    ALKALINE PHOSPHATASE      45 - 117 U/L 49    Total Bilirubin      0.2 - 1.1 mg/dL 0.6    PROTEIN, TOTAL      5.7 - 8.2 g/dL 7.1    Albumin      3.2 - 4.8 g/dL 4.5    Globulin      2.8 - 4.4 g/dL 2.6 (L)    A/G Ratio      1.0 - 2.0  1.7    Patient Fasting for CMP? Yes    WBC      4.0 - 11.0 x10(3) uL 6.2    RBC      4.30 - 5.70 x10(6)uL 4.76    Hemoglobin      13.0 - 17.5 g/dL 14.3    Hematocrit      39.0 - 53.0 % 40.4    MCV      80.0 - 100.0 fL 84.9    MCH      26.0 - 34.0 pg 30.0    MCHC      31.0 - 37.0 g/dL 35.4    RDW      11.0 - 15.0 % 12.8    RDW-SD      35.1 - 46.3 fL 39.1    Platelet Count      150.0 - 450.0 10(3)uL 258.0    Cholesterol, Total      <200 mg/dL 97    HDL Cholesterol      40 - 59 mg/dL 62 (H)    Triglycerides      30 - 149 mg/dL 47    LDL Cholesterol Calc      <100 mg/dL 23    VLDL      0 - 30 mg/dL 6    NON-HDL CHOLESTEROL      <130 mg/dL 35    Patient Fasting for Lipid? Yes    MALB URINE      mg/dL 0.70    CREATININE UR RANDOM      mg/dL 130.60    MALB/CRE CALC      <=30.0 ug/mg 5.4    HEMOGLOBIN A1c      <5.7 % 6.7 (H)    ESTIMATED AVERAGE GLUCOSE      68 - 126 mg/dL 146 (H)    TOTAL PSA      <=4.00 ng/mL 2.74    PSA Screen      <=4.00  2.74    TSH      0.550 - 4.780 mIU/mL 1.228    Vitamin B12      211 - 911 pg/mL 369    VITAMIN D, 25-OH, TOTAL      30.0 - 100.0 ng/mL 34.0       Legend:  (H) High  (L) Low    Assessment and Plan:   Problem List Items Addressed This Visit       Controlled type 2 diabetes mellitus without complication, without long-term current use of insulin (HCC)     Continue present management  Continue working on decreased simple carb intake and exercise w goal 30 min daily         Essential hypertension     cpm         History of BPH     Psa is eleated from last check  On finasteride  Per uro-encourage to make follow up appt         Hypercholesteremia     Continue present management  Please aim to eat a diet high in fresh fruits and vegetables,  lean protein sources, complex carbohydrates and limited processed and fast foods.  Try to get at least 150 minutes of exercise per week-a combination of weight resistance and cardio is preferred.             Immunization due     Zoster #2 vaccine         Relevant Orders    ZOSTER VACC RECOMBINANT IM NJX (Completed)    Neuropathy of right foot     Start gabapentin 100 mg I po nightly x 3 nights-may increase to twice a day or 2 tabs at night  Refer physiatry         Relevant Medications    gabapentin 100 MG Oral Cap    Other Relevant Orders    PHYSIATRY - INTERNAL    Prostate cancer screening    Well adult exam - Primary     Labs reviewed  Please aim to eat a diet high in fresh fruits and vegetables, lean protein sources, complex carbohydrates and limited processed and fast foods.  Try to get at least 150 minutes of exercise per week-a combination of weight resistance and cardio is preferred.    Up to date colon ca                      Discussed plan of care with pt and pt is in agreement.All questions answered. Pt to call with questions or concerns.      Encouraged to sign up for My Chart if not already registered.

## 2024-02-12 NOTE — ASSESSMENT & PLAN NOTE
Start gabapentin 100 mg I po nightly x 3 nights-may increase to twice a day or 2 tabs at night  Refer physiatry

## 2024-02-12 NOTE — ASSESSMENT & PLAN NOTE
Continue present management  Continue working on decreased simple carb intake and exercise w goal 30 min daily

## 2024-02-23 ENCOUNTER — APPOINTMENT (OUTPATIENT)
Dept: URBAN - METROPOLITAN AREA CLINIC 244 | Age: 64
Setting detail: DERMATOLOGY
End: 2024-02-24

## 2024-02-23 DIAGNOSIS — L72.0 EPIDERMAL CYST: ICD-10-CM

## 2024-02-23 PROCEDURE — OTHER PRESCRIPTION: OTHER

## 2024-02-23 PROCEDURE — OTHER DIAGNOSIS COMMENT: OTHER

## 2024-02-23 PROCEDURE — 99203 OFFICE O/P NEW LOW 30 MIN: CPT

## 2024-02-23 PROCEDURE — OTHER COUNSELING: OTHER

## 2024-02-23 RX ORDER — DOXYCYCLINE 100 MG/1
CAPSULE ORAL
Qty: 60 | Refills: 0 | Status: ERX | COMMUNITY
Start: 2024-02-23

## 2024-02-23 ASSESSMENT — LOCATION ZONE DERM: LOCATION ZONE: FACE

## 2024-02-23 ASSESSMENT — LOCATION SIMPLE DESCRIPTION DERM: LOCATION SIMPLE: RIGHT CHEEK

## 2024-02-23 ASSESSMENT — LOCATION DETAILED DESCRIPTION DERM: LOCATION DETAILED: RIGHT SUPERIOR CENTRAL MALAR CHEEK

## 2024-02-23 NOTE — HPI: SKIN LESION (SKIN TAGS)
How Severe Are They?: mild
Is This A New Presentation, Or A Follow-Up?: Skin Lesion
Additional History: Patient got antibiotics for 10 days from urgent care.

## 2024-02-28 ENCOUNTER — MED REC SCAN ONLY (OUTPATIENT)
Dept: FAMILY MEDICINE CLINIC | Facility: CLINIC | Age: 64
End: 2024-02-28

## 2024-03-13 ENCOUNTER — OFFICE VISIT (OUTPATIENT)
Dept: SURGERY | Facility: CLINIC | Age: 64
End: 2024-03-13

## 2024-03-13 DIAGNOSIS — R97.20 ELEVATED PSA: Primary | ICD-10-CM

## 2024-03-13 PROCEDURE — 99213 OFFICE O/P EST LOW 20 MIN: CPT | Performed by: UROLOGY

## 2024-03-13 NOTE — PROGRESS NOTES
Piper Earl MD  Department of Urology  [address]  [place] [address]    T: [phone]  F: [phone]    To: Jossue Lui DO   [address]  [facility] [address]    Re: Mario Loera   MRN: [id number]  : 1960    Dear Jossue Lui DO,    Today I had the pleasure of seeing Mario Loera in my clinic. As you know, Mr. Loera is a pleasant 64 year old year old male who I am seeing for PSA. Patient was last seen in this department on 2023.    Briefly, patient had previously seen me for nephrolithiasis.  He last saw me in May 2023 for follow-up after ureteroscopy in 2022.  His stones were treated on the right side.     He recently had a PSA in 2024 which was 2.74 (as he is on finasteride this is corrected to 5.48).      PSA, TOTAL PSA PSA Screen TOTAL PSA   Latest Ref Rng < OR = 4.0 ng/mL 0.0 - 4.0 ng/mL <=4.00  <=4.00 ng/mL   2016 0.8       2018  0.8      2019   0.96     2020   1.08     2021   1.58     2/10/2022   1.94     10/4/2022   2.44     2023   3.61     2/3/2024   2.74  2.74      Has strong family history of prostate cancer in father and brothers.       PAST MEDICAL HISTORY:  Past Medical History:   Diagnosis Date    Calculus of kidney     High blood pressure     High cholesterol     Kidney stones     Other and unspecified hyperlipidemia     Prostatitis     Type II or unspecified type diabetes mellitus without mention of complication, not stated as uncontrolled     Unspecified essential hypertension         PAST SURGICAL HISTORY:  Past Surgical History:   Procedure Laterality Date    APPENDECTOMY      COLONOSCOPY      COLONOSCOPY N/A 2019    Procedure: COLONOSCOPY;  Surgeon: Simon Alvarez MD;  Location: [facility] ENDOSCOPY    HERNIA SURGERY      THORACOTOMY,EXCIS BULLAE      as infant for empyema         ALLERGIES:  Allergies   Allergen Reactions    Ciprofloxacin OTHER (SEE COMMENTS)     Don't  remember         MEDICATIONS:  Current Outpatient Medications   Medication Instructions    amLODIPine (NORVASC) 5 mg, Oral, Daily    Ascorbic Acid (VITAMIN C OR) Oral    aspirin 81 mg, Oral, Daily    Coenzyme Q10 (CO Q 10 OR) 1 tablet, Oral, Daily    docusate sodium (COLACE) 100 mg, Oral, 2 times daily    finasteride (PROSCAR) 5 mg, Oral, Daily    FreeStyle Lancets Does not apply Misc USE 1  TO CHECK GLUCOSE ONCE DAILY    gabapentin 100 MG Oral Cap Take 1 capsule (100 mg total) by mouth nightly for 3 days, THEN 1 capsule (100 mg total) 2 (two) times daily.    Glucose Blood (FREESTYLE LITE TEST) In Vitro Strip 1 strip, Other, Daily, Check blood sugar daily    lisinopril (PRINIVIL; ZESTRIL) 40 mg, Oral, Daily    metFORMIN HCl (GLUCOPHAGE) 1,000 mg, Oral, 2 times daily with meals    Multiple Vitamins-Minerals (MULTIVITAMIN MEN OR) Oral    Polyethylene Glycol 3350 (MIRALAX) 17 g, Oral, Daily    rosuvastatin (CRESTOR) 40 mg, Oral, Nightly        FAMILY HISTORY:  Family History   Problem Relation Age of Onset    Heart Disease Father 69        had [address] and 3 way bypass.    Cancer Father         Prostate    Heart Disease Paternal Aunt     Heart Disease Mother     Cancer Other         Prostate    Cancer Paternal Grandfather         prostate cancer        SOCIAL HISTORY:  Social History     Socioeconomic History    Marital status: Single   Tobacco Use    Smoking status: Never    Smokeless tobacco: Never   Vaping Use    Vaping Use: Never used   Substance and Sexual Activity    Alcohol use: No    Drug use: No          PHYSICAL EXAMINATION:  There were no vitals filed for this visit.  CONSTITUTIONAL: No apparent distress, cooperative and communicative  NEUROLOGIC: Alert and oriented   HEAD: Normocephalic, atraumatic   EYES: Sclera non-icteric   ENT: Hearing intact, moist mucous membranes   NECK: No obvious goiter or masses   RESPIRATORY: Normal respiratory effort, Nonlabored breathing on room air  SKIN: No evident rashes    ABDOMEN: Soft, nontender, nondistended, no rebound tenderness, no guarding, no masses  DIGITAL RECTAL EXAM: some subtle firmness at the left base    REVIEW OF SYSTEMS:    A comprehensive 10-point review of systems was completed.  Pertinent positives and negatives are noted in the the HPI.       LABORATORY DATA:   PSA, TOTAL PSA PSA Screen TOTAL PSA   Latest Ref Rng < OR = 4.0 ng/mL 0.0 - 4.0 ng/mL <=4.00  <=4.00 ng/mL   5/18/2016 0.8       1/17/2018  0.8      2/27/2019   0.96     2/8/2020   1.08     9/9/2021   1.58     2/10/2022   1.94     10/4/2022   2.44     7/19/2023   3.61     2/3/2024   2.74  2.74              IMAGING REVIEW:  Narrative   PROCEDURE:   CT ABDOMEN + PELVIS KIDNEYSTONE 2D RNDR (NO IV NO ORAL) (CPT=74176)     COMPARISON: [facility], CT UROGRAM(W+WO) W/3D SH(CPT=74178/46901), 11/16/2022, 2:58 PM.  [facility], CT PF RENAL STONE ABD/P WO CON, 9/20/2014, 9:21 PM.     INDICATIONS: Nephrolithiasis     TECHNIQUE:   CT images of the abdomen and pelvis were obtained without intravenous contrast material.  Automated exposure control for dose reduction was used. Adjustment of the mA and/or kV was done based on the patient's size. Use of iterative  reconstruction technique for dose reduction was used. Dose information is transmitted to the ACR (American College of Radiology) NRDR (National Radiology Data Registry) which includes the Dose Index Registry.        FINDINGS:  Lower chest:  Clear lung bases  Liver and gallbladder:  Normal  Pancreas:  Normal  Spleen:  Normal  Adrenals:  Normal  Kidneys:  Normal size unobstructed kidneys.  No calculi.  Stable cyst in the inter pole of the left kidney, 1.7 centimeter in size  Aorta:  Normal-size next sign bowel loops:  Mild colonic diverticulosis.  No obstruction or ascites.  Prior appendectomy  Pelvis:  Normal bladder.  No adenopathy  Bones:  No fractures or lesions                  Impression   CONCLUSION: Normal  size unobstructed kidneys.  No  calculi           Dictated by (CST): Dilan Gray MD on 5/03/2023 at 8:00 AM      Finalized by (CST): Dilan Gray MD on 5/03/2023 at 8:04 AM           OTHER RELEVANT DATA:   none     IMPRESSION: Elevated PSA (corrected on finasteride to 5.48-offered patient recheck of PSA versus MRI followed by biopsy versus biopsy alone.  He opted for prostate mri    We discussed reasons for PSA elevation including enlarged prostate, \"prostate jostling\", recent ejaculation x72 hours prior to PSA collection, UTI and malignancy.     Discussed risks of biopsy which include bleeding (hematospermia, hematochezia and hematuria), infection, sepsis, temporary erectile dysfunction, pelvic pain and possible death from infectious complications.     He knows that MRI is not a direct substitute for biopsy and even if negative does not rule out malignancy. It would help with targeting lesions during biopsy or potentially delaying biopsy pending psas. IT is a decision making tool.       PLAN:  Prostate mri  Rtc after prostate mri    Thank you for referring this very pleasant patient to my clinic. If you have any questions or concerns, please do not hesitate to contact me.    Sincerely,  Piper Earl MD    30 minutes were spent on this patient at this visit obtaining a history, reviewing medical records, developing a treatment plan, counseling and discussing treatment strategy with patient, coordination of care and documentation.     The 21st Century Cures Act makes medical notes available to patients in the interest of transparency.  However, please be advised that this is a medical document.  It is intended as a peer to peer communication.  It is written in medical language and may contain abbreviations or verbiage that are technical and unfamiliar.  It may appear blunt or direct.  Medical documents are intended to carry relevant information, facts as evident, and the clinical opinion of the  practitioner.

## 2024-03-15 ENCOUNTER — TELEPHONE (OUTPATIENT)
Dept: SURGERY | Facility: CLINIC | Age: 64
End: 2024-03-15

## 2024-03-15 NOTE — TELEPHONE ENCOUNTER
Per pt confused about procedure he is supposed to have, pt asking for RN to call his girlfriend Destiny Moreno, states she is a RN, please call her at [phone], pt states he is giving verbal to discuss any medical and sensitive information with her. Please call thank you.

## 2024-03-15 NOTE — TELEPHONE ENCOUNTER
Spoke with patients girlfriend Destiny. I advised her that I do not see patient is currently scheduled for a procedure. Per last office visit note from 3/13/24 patient proceeding with Prostate MRI.     I advised Destiny on  note below. She confirmed and verbalized understanding. She states patient is scheduled for MRI for first available which is May 1st 2024. I advised her that unfortunately the MRI department is very booked. I advised her that Mario should contact central scheduling to see if he can get on a wait list in case something sooner opens up. She would like this message forward to  as an FYI and she will also notify patient.     Future Appointments   Date Time Provider Department Center   5/1/2024  4:45 PM [facility] MRI RM2 (3T WIDE) [facility] MRI EM [place]       \"He knows that MRI is not a direct substitute for biopsy and even if negative does not rule out malignancy. It would help with targeting lesions during biopsy or potentially delaying biopsy pending psas. IT is a decision making tool.        PLAN:  Prostate mri  Rtc after prostate mri     Thank you for referring this very pleasant patient to my clinic. If you have any questions or concerns, please do not hesitate to contact me.     Sincerely,  Piper Earl MD\"

## 2024-03-15 NOTE — TELEPHONE ENCOUNTER
Patient has questions regarding what kind of procedure he will be having per pt was  told by Dr Earl it will only take 5 min?Please advise

## 2024-05-01 ENCOUNTER — HOSPITAL ENCOUNTER (OUTPATIENT)
Dept: MRI IMAGING | Facility: HOSPITAL | Age: 64
Discharge: HOME | End: 2024-05-01
Attending: UROLOGY
Payer: COMMERCIAL

## 2024-05-01 DIAGNOSIS — R97.20 ELEVATED PSA: ICD-10-CM

## 2024-05-01 PROCEDURE — A9575 INJ GADOTERATE MEGLUMI 0.1ML: HCPCS | Performed by: UROLOGY

## 2024-05-01 PROCEDURE — 72197 MRI PELVIS W/O & W/DYE: CPT | Performed by: UROLOGY

## 2024-05-01 RX ORDER — GADOTERATE MEGLUMINE 376.9 MG/ML
20 INJECTION INTRAVENOUS
Status: COMPLETED | OUTPATIENT
Start: 2024-05-01 | End: 2024-05-01

## 2024-05-01 RX ORDER — GADOTERATE MEGLUMINE 376.9 MG/ML
20 INJECTION INTRAVENOUS
Status: CANCELLED | OUTPATIENT
Start: 2024-05-01

## 2024-05-01 RX ADMIN — GADOTERATE MEGLUMINE 19 ML: 376.9 INJECTION INTRAVENOUS at 17:56:00

## 2024-05-08 ENCOUNTER — OFFICE VISIT (OUTPATIENT)
Dept: SURGERY | Facility: CLINIC | Age: 64
End: 2024-05-08
Payer: COMMERCIAL

## 2024-05-08 ENCOUNTER — TELEPHONE (OUTPATIENT)
Dept: SURGERY | Facility: CLINIC | Age: 64
End: 2024-05-08

## 2024-05-08 DIAGNOSIS — R97.20 ELEVATED PSA: Primary | ICD-10-CM

## 2024-05-08 PROCEDURE — 99213 OFFICE O/P EST LOW 20 MIN: CPT | Performed by: UROLOGY

## 2024-05-08 RX ORDER — CEFDINIR 300 MG/1
300 CAPSULE ORAL EVERY 12 HOURS
Qty: 6 CAPSULE | Refills: 0 | Status: SHIPPED | OUTPATIENT
Start: 2024-05-08 | End: 2024-05-11

## 2024-05-08 RX ORDER — SULFAMETHOXAZOLE AND TRIMETHOPRIM 800; 160 MG/1; MG/1
1 TABLET ORAL 2 TIMES DAILY
Qty: 6 TABLET | Refills: 0 | Status: SHIPPED | OUTPATIENT
Start: 2024-05-08 | End: 2024-05-11

## 2024-05-08 NOTE — PROGRESS NOTES
Piper Earl MD  Department of Urology  [address] [place] [address]., [address]  [place] [address]    T: [phone]  F: [phone]    To: Jossue Lui DO   [address] [facility] [address]  [facility] [address]    Re: Mario Loera   MRN: [id number]  : 1960    Dear Jossue Lui DO,    Today I had the pleasure of seeing Mario Loera in my clinic. As you know, Mr. Loera is a pleasant 64 year old year old male who I am seeing for MRI followup. Patient was last seen in this department on 3/13/2024.    Briefly,  patient had previously seen me for nephrolithiasis.  He last saw me in May 2023 for follow-up after ureteroscopy in 2022.  His stones were treated on the right side.      He recently had a PSA in 2024 which was 2.74 (as he is on finasteride this is corrected to 5.48).        PSA, TOTAL PSA PSA Screen TOTAL PSA   Latest Ref Rng < OR = 4.0 ng/mL 0.0 - 4.0 ng/mL <=4.00  <=4.00 ng/mL   2016 0.8          2018   0.8        2019     0.96      2020     1.08      2021     1.58      2/10/2022     1.94      10/4/2022     2.44      2023     3.61      2/3/2024     2.74  2.74      Has strong family history of prostate cancer in father and brothers.    Plan at last visit in 2024 was to obtain a prostate MRI and return to clinic after prostate MRI as his PSA was elevated to 5.48 (corrected on finasteride.    He did undergo a prostate MRI on 2024 that demonstrated a 29 g prostate with a PI-RADS 3 lesion.  There is no lymphadenopathy or bony lesions.  Additionally there is no seminal vesicle or bladder abnormalities.       PAST MEDICAL HISTORY:  Past Medical History:    Calculus of kidney    High blood pressure    High cholesterol    Kidney stones    Other and unspecified hyperlipidemia    Prostatitis    Type II or unspecified type diabetes mellitus without mention of complication, not stated as uncontrolled    Unspecified essential hypertension        PAST  SURGICAL HISTORY:  Past Surgical History:   Procedure Laterality Date    Appendectomy      Colonoscopy      Colonoscopy N/A 12/30/2019    Procedure: COLONOSCOPY;  Surgeon: Simon Alvarez MD;  Location: [facility] ENDOSCOPY    Hernia surgery      Thoracotomy,excis bullae      as infant for empyema         ALLERGIES:  Allergies   Allergen Reactions    Ciprofloxacin OTHER (SEE COMMENTS)     Don't remember         MEDICATIONS:  Current Outpatient Medications   Medication Instructions    amLODIPine (NORVASC) 5 mg, Oral, Daily    Ascorbic Acid (VITAMIN C OR) Oral    aspirin 81 mg, Oral, Daily    Coenzyme Q10 (CO Q 10 OR) 1 tablet, Daily    docusate sodium (COLACE) 100 mg, Oral, 2 times daily    finasteride (PROSCAR) 5 mg, Oral, Daily    FreeStyle Lancets Does not apply Misc USE 1  TO CHECK GLUCOSE ONCE DAILY    Glucose Blood (FREESTYLE LITE TEST) In Vitro Strip 1 strip, Other, Daily, Check blood sugar daily    lisinopril (PRINIVIL; ZESTRIL) 40 mg, Oral, Daily    metFORMIN HCl (GLUCOPHAGE) 1,000 mg, Oral, 2 times daily with meals    Multiple Vitamins-Minerals (MULTIVITAMIN MEN OR) Oral    Polyethylene Glycol 3350 (MIRALAX) 17 g, Oral, Daily    rosuvastatin (CRESTOR) 40 mg, Oral, Nightly        FAMILY HISTORY:  Family History   Problem Relation Age of Onset    Heart Disease Father 69        had [address] and [address] way bypass.    Cancer Father         Prostate    Heart Disease Paternal Aunt     Heart Disease Mother     Cancer Other         Prostate    Cancer Paternal Grandfather         prostate cancer        SOCIAL HISTORY:  Social History     Socioeconomic History    Marital status: Single   Tobacco Use    Smoking status: Never    Smokeless tobacco: Never   Vaping Use    Vaping status: Never Used   Substance and Sexual Activity    Alcohol use: No    Drug use: No          PHYSICAL EXAMINATION:  There were no vitals filed for this visit.  CONSTITUTIONAL: No apparent distress, cooperative and communicative  NEUROLOGIC: Alert and  oriented   HEAD: Normocephalic, atraumatic   EYES: Sclera non-icteric   ENT: Hearing intact, moist mucous membranes   NECK: No obvious goiter or masses   RESPIRATORY: Normal respiratory effort, Nonlabored breathing on room air  SKIN: No evident rashes   ABDOMEN: Soft, nontender, nondistended, no rebound tenderness, no guarding, no masses  DIGITAL RECTAL EXAM: some subtle firmness at the left base     REVIEW OF SYSTEMS:    A comprehensive 10-point review of systems was completed.  Pertinent positives and negatives are noted in the the HPI.       LABORATORY DATA:  Microalbumin, Urine  mg/dL 0.70   Creatinine Ur Random  mg/dL 130.60   Malb/Cre Calc  <=30.0 ug/mg 5.4     Prostate Specific Antigen Screen  <=4.00 2.74     HgbA1C  <5.7 % 6.7 High    Comment:  Normal HbA1C:     <5.7%   Pre-Diabetic:     5.7 - 6.4%   Diabetic:         >6.4%   Diabetic Control: <7.0%     Estimated Average Glucose  68 - 126 mg/dL 146 High    Comment: eAG is the estimated a        Total PSA  <=4.00 ng/mL 2.74     IMAGING REVIEW:  Narrative   PROCEDURE: MRI PROSTATE(W+WO) (CPT=72197)     COMPARISON: CT PF RENAL STONE ABD/P WO CON, 9/20/2014, 9:21 PM.  CT ABD PELV W CONTRAST, 7/14/2008, 1:00 PM.  CT ABD PELV W CONTRAST, 7/09/2008, 9:34 AM.  CT ABD PELV W CONTRAST, 7/03/2008, 5:55 PM.  CT ABDOMEN + PELVIS KIDNEYSTONE 2D RNDR (NO IV NO  ORAL) (CPT=741, 5/02/2023, 3:41 PM.  CT UROGRAM(W+WO) W/3D SH(CPT=74178/64567), 11/16/2022, 2:58 PM.     INDICATIONS: Elevated PSA (R97.20).     PSA HISTORY:   2.74 ng/mL (02/03/2024)  3.61 ng/mL (07/19/2023)  2.44 ng/mL (10/04/2022)  1.94 ng/mL (02/10/2022)  1.58 ng/mL (09/09/2021)  1.08 ng/mL (02/08/2020)  0.96 ng/mL (02/27/2019)  0.8 ng/mL (05/18/2016)     TECHNIQUE: A complete multi-planar, multiparametric examination was performed at 3 [place] without use of an endorectal coil to optimize visualization of suspected pathology. Images were obtained both before and after administration of  intravenous  gadolinium-based contrast agent.       FINDINGS:  PROSTATE:  SIZE: The prostate gland measures 4.7 x 3.6 x 3.6 cm, for a calculated volume of 29 cc.  PSA DENSITY: 0.09 ng/mL2       PERIPHERAL ZONE: The peripheral zone is predominantly T2 hyperintense. Heterogeneous areas of hypointense signal are scattered throughout the peripheral zone.  Index Lesion:  At the left anterior aspect of the peripheral zone, there is a well-circumscribed T2 hypointense lesion measuring 0.9 x 1.2 cm (series 750, image 68; series 4, image 24). This appears to have hyperintense signal on the high B value DWI  sequence (series 7, image 67).    TRANSITION ZONE: Numerous well-circumscribed ovoid nodules are seen throughout the transition zone, compatible with benign prostatic hyperplasia. There are no ill-defined, T2 hypointense lesions.     OTHER PELVIC FINDINGS:  SEMINAL VESICLES: Homogeneously T2 hyperintense signal with relatively diminutive/collapsed morphology.    URINARY BLADDER: Incompletely distended without visible calculus. Mild circumferential bladder wall thickening may relate to underdistention.  PELVIC NODES: No lymphadenopathy with short axis measurement greater than 0.8 cm of the para-aortic, paracaval, common iliac, internal iliac, external iliac, obturator, pararectal, presecral, or common femoral chains.    BONES:   No suspicious osseous lesions are demonstrated in the imaged volume. There is high-grade cartilage loss of the hips. Subchondral cystic changes of the acetabula are demonstrated bilaterally. There may be extensive paralabral cyst formation, left   greater than right.  BODY WALL: There is a minute fat-containing umbilical hernia.  OTHER:   Scattered colonic diverticula are present in the sigmoid colon. There is no colonic wall thickening or pericolonic fat stranding. No free fluid is seen in the pelvis.                    Impression   CONCLUSION:  1. PI-RADS CLASSIFICATION:  PI-RADS 3 - Intermediate  (the presence of clinically significant cancer is equivocal).     2. There is an indeterminate lesion at the left aspect of the prostatic apex. Continued ascertainment of quantitative PSA can be considered with follow-up prostatic MRI in 1 year as part of an active surveillance program.     3. No elevation of PSA density.     4. Negative for discernible intrapelvic lymphadenopathy.       5. No suspicious osseous lesions are identified in the imaged volume.     6. Distal colonic diverticulosis without evidence of MR complication in the imaged volume.     7. Lesser incidental findings as above.          Prostate Imaging - Reporting and Data System version 2 (PI-RADS v2) Assessment Categories:     PI-RADS 1: Very low (clinically significant cancer is highly unlikely to be present)  PI-RADS 2: Low (clinically significant cancer is unlikely to be present)  PI-RADS 3: Intermediate (the presence of clinically significant cancer is equivocal)  PI-RADS 4: High (clinically significant cancer is likely to be present)  PI-RADS 5: Very high (clinically significant cancer is likely to be present)            elm-remote.        Dictated by (CST): Baudilio Price MD on 5/02/2024 at 10:51 PM      Finalized by (CST): Baudilio Price MD on 5/02/2024 at 11:02 PM           Result History    MRI PROSTATE (W+WO)(CPT=72197) (Order #[id number]) on 5/2/2024 - Order Result History Report  Signed by       OTHER RELEVANT DATA:   none     IMPRESSION: Elevated PSA to 5.48 corrected on finasteride with MRI demonstrating PI-RADS 3 lesion.  Given his very strong family history of prostate cancer, equivocal digital rectal exam and MRI findings we discussed MRI guided biopsy versus continued PSA checks.  He opted for MRI guided biopsy.    Discussed risks of biopsy which include bleeding (hematospermia, hematochezia and hematuria), infection, sepsis, temporary erectile dysfunction, pelvic pain and possible death from infectious complications. The patient  agreed to proceed.      We will obtain a urine culture 1-2 weeks prior to the biopsy, and treat as indicated. He was written for antibiotics qd x3 to start 1 day prior to his biopsy - orders placed.       PLAN:  UroNav biopsy  Urine cultures 10 days before  He will need to hold NSAIDs 10 days prior  Antibiotics prescribed  Instructions reviewed including enema and antibiotics as well as urine culture    Thank you for referring this very pleasant patient to my clinic. If you have any questions or concerns, please do not hesitate to contact me.    Sincerely,  Piper Earl MD    30 minutes were spent on this patient at this visit obtaining a history, reviewing medical records, developing a treatment plan, counseling and discussing treatment strategy with patient, coordination of care and documentation.     The 21st Century Cures Act makes medical notes available to patients in the interest of transparency.  However, please be advised that this is a medical document.  It is intended as a peer to peer communication.  It is written in medical language and may contain abbreviations or verbiage that are technical and unfamiliar.  It may appear blunt or direct.  Medical documents are intended to carry relevant information, facts as evident, and the clinical opinion of the practitioner.

## 2024-05-08 NOTE — TELEPHONE ENCOUNTER
Hi!  This patient needs a UroNAV in the next several weeks. Needs urine culture prior, hold NSAIDS x 10 days, antibitoics prior, enema prior (Regular biopsy instructions)    Thanks!  AR    Urology Surgery Scheduling Request    Location: [facility] OR    Surgeon: TREV Earl MD    Asst. Surgeon: N/A    Diagnosis: elevated PSA    Procedure: prostate biopsy MRI guided (uronav)    Anesthesia: local    Time Frame: next few weeks    Time needed: 15 minutes    Special Equipment: Uronav machine    On Call to OR: ordered    Admission: Day Surgery    Pre-op Testing:  Urine Culture     Need Pre-op Clearance: none    Estimated Post Op/Follow Up Appt: 7-14 days postop    Piper Earl MD

## 2024-05-10 NOTE — TELEPHONE ENCOUNTER
Spoke with patient, scheduled Uro-Marito fusion Prostate Biopsy Thursday 06/13/2024.    Pre-op instructions will be mailed to patient' home, once received will call office to review, please transfer call to [phone].    Patient informed to please have urine done 7 days prior to scheduled procedure.

## 2024-06-03 ENCOUNTER — LAB ENCOUNTER (OUTPATIENT)
Dept: LAB | Age: 64
End: 2024-06-03
Attending: UROLOGY
Payer: COMMERCIAL

## 2024-06-03 DIAGNOSIS — R97.20 ELEVATED PSA: ICD-10-CM

## 2024-06-03 PROCEDURE — 87086 URINE CULTURE/COLONY COUNT: CPT

## 2024-06-13 ENCOUNTER — HOSPITAL ENCOUNTER (OUTPATIENT)
Dept: ULTRASOUND IMAGING | Facility: HOSPITAL | Age: 64
Discharge: HOME | End: 2024-06-13
Attending: UROLOGY
Payer: COMMERCIAL

## 2024-06-13 ENCOUNTER — TELEPHONE (OUTPATIENT)
Dept: SURGERY | Facility: CLINIC | Age: 64
End: 2024-06-13

## 2024-06-13 DIAGNOSIS — R97.20 ELEVATED PSA: ICD-10-CM

## 2024-06-13 PROCEDURE — 55700 BIOPSY OF PROSTATE,NEEDLE/PUNCH: CPT | Performed by: UROLOGY

## 2024-06-13 PROCEDURE — 76942 ECHO GUIDE FOR BIOPSY: CPT | Performed by: UROLOGY

## 2024-06-13 NOTE — DISCHARGE INSTRUCTIONS
Performed by Dr. RUIZ    1. Hematuria (blood in urine) and/or blood in your bowel movement    Usually minimal-lasting 24 hours is expected  Hydrating with additional fluids such as water an juices is necessary to minimize hematuria.  Blood may also be present in stool after bowel movement. Avoid straining during elimination, avoid constipating food items.  Call ordering doctor if bleeding persists or worsens, such as a darker in appearance or thicker in consistency.    2. Activity  Rest quietly for the next 24 hours. Avoid straining, lifting heavy items, or strenuous physical activity for approximately 2 days.    3. Infection  Continue with antibiotics as ordered. If further signs or symptoms of infection occur such as:  fever (temp greater than 100) or severe discomfort persist, call the ordering doctor. Use tylenol for discomfort. Avoid aspirin, NSAIDs, and Ibuprofen products for 48 hours.    4.  Diet  No dietary restrictions except food products that cause constipation.Hydrate well.    5.  Results  Call/follow-up with ordering doctor for results, usually 5-7 days.    6.  Follow-up  The Radiology Department would like to contact you about your recovery and experience. Any additional questions may be answered at this time. Any questions, call [facility] Radiology Department at [phone]. Our hours are Monday through Friday 8AM - 4PM, Saturday 8AM-12PM and ask to speak to a nurse if you are unable to contact the ordering doctor.

## 2024-06-13 NOTE — TELEPHONE ENCOUNTER
Hi all needs follow up to discuss prostate biopsy pathology in 7-14days  He needs an am appt ok to ob

## 2024-06-13 NOTE — IMAGING NOTE
PATIENT ARRIVAL TIME: 1154    ULTRASOUND TECH: SILVERIO    PHYSICIAN TO PERFORM PROCEDURE: DR RUIZ    HISTORY TAKEN: ELEVATED PSA    BASE LINE VITAL SIGNS: /88 THEN 168/77. MD AWARE    ANTIBIOTICS TAKEN: YES    FLEETS ENEMA TAKEN YES    PROCEDURE EXPLAINED & CONSENTED: 1200    PHYSICIAN ARRIVAL TIME: 1200    TIME OUT COMPLETED @: 1203     IMAGES/SCANS CORRELATED: 1207    LIDOCAINE INSTILLED UTILIZING 22 G-20 CM CHIBA NEEDLE @ 1208    SAMPLING BEGUN @: 1207    CORE SAMPLES WITH 18 G - 25 CM BARD BIOPSY NEEDLE:    SITES:     REGION OF INTEREST (WERO),      RIGHT MID/BASE/APEX,      LEFT MID/BASE/APEX     SAMPLES APPLIED TO TELFA PRIOR TO IMMERSING INTO FORMALIN 10% SOLUTION    SAMPLING COMPLETED: 1211    SPECIMENS TO LAB/CYTOLOGY/GROSS ROOM    PATIENT DISCHARGES-AVS INSTRUCTIONS PROVIDED

## 2024-06-13 NOTE — TELEPHONE ENCOUNTER
I s/w pt and informed him of the msg from AR as stated below and he accepted an appt for thurs. 7/17 at 9:30 am.

## 2024-06-13 NOTE — OPERATIVE REPORT
TRANSRECTAL ULTRASOUND/PROSTATE NEEDLE BIOPSY/URONAV     PRE-OP DIAGNOSIS: Elevated PSA     POST-OP DIAGNOSIS: Same     PROCEDURE:  1. Transrectal ultrasound of the prostate MRI GUIDANCE  2. Periprostatic nerve block  3. Ultrasound guided needle placement  4. 12 Core-prostate needle biopsy + MRI GUIDED BIOPSIES     SURGEON: Piper Earl MD     ASSISTANT: none     EBL: minimal     VIV:  No nodules     ULTRASOUND FINDINGS:   1. 2.9H, 4.4W, 4.9L, volume of 33.33cc.      INDICATIONS: PSA was elevated to 5.48 (corrected on finasteride.     He did undergo a prostate MRI on 2024 that demonstrated a 29 g prostate with a PI-RADS 3 lesion.  There is no lymphadenopathy or bony lesions.  Additionally there is no seminal vesicle or bladder abnormalities.     PROCEDURE: Patient was brought to the procedure suite and an time-out was performed identifiying the patient,  and procedure being performed. The risks of the procedure were once again detailed to patient including bleeding (hematospermia, hematochezia and hematuria), infection, sepsis, temporary erectile dysfunction, pelvic pain and possible death from infectious complications. The patient agreed to proceed. The patient did have a negative urine culture and took antibiotics 1 day prior to the procedure and 1 hour prior to the biopsy. He will take one more day of antibiotics tomorrow to complete his antibiotic course.      He was placed in a LEFT flank position and a VIV was performed revealing a  gland with no nodules or firmness. The gloved finger was removed and the end-fire prostate biopsy ultrasound probe was introduced per anus into the rectum. 0.5% marcaine without epinephrine was injection into the periprostatic nerve bundle, 5cc on each side using ultrasound guidance. The prostate was measured sagittaly and transversely and no hypoechoic nodules/areas were seen. The prostate was measured with dimensions of 2.9H, 4.4W, 4.9L, volume of 33.33cc.    MRI was  used to correlate with the ultrasound guided images. 3 biopsies were taken from the clinically significant lesions.     Using ultrasound guidance, biopsies were taken from the lateral and medial aspects of the base, middle and apex of each lobe. The total number of biopsies was 6 cores from each lobe, 12 in total. These were labeled and sent to pathology for specimen analysis.     The probe was removed from the rectum and a finger was placed in the rectum to hold pressure on the prostate for several minutes. There was no active bleeding after removal of the finger. There were no complications after this procedure and the patient tolerated the biopsy without issue.     He will follow up in 1-2 weeks to review pathology and determine best management course.    15 biopsis total

## 2024-06-17 ENCOUNTER — OFFICE VISIT (OUTPATIENT)
Dept: SURGERY | Facility: CLINIC | Age: 64
End: 2024-06-17
Payer: COMMERCIAL

## 2024-06-17 DIAGNOSIS — C61 PROSTATE CANCER (HCC): Primary | ICD-10-CM

## 2024-06-17 PROCEDURE — 99214 OFFICE O/P EST MOD 30 MIN: CPT | Performed by: UROLOGY

## 2024-06-17 NOTE — PROGRESS NOTES
Piper Earl MD  Department of Urology  [address] [place] [address]., [address]  [place] [address]    T: [phone]  F: [phone]    To: Jossue Lui DO   [address] [facility] [address]  [facility] [address]    Re: Mario Loera   MRN: [id number]  : 1960    Dear Jossue Lui DO,    Today I had the pleasure of seeing Mario Leora in my clinic. As you know, Mr. Loera is a pleasant 64 year old year old male who I am seeing for biopsy results. Patient was last seen in this department on 2024.    Briefly, patient underwent UroNav biopsy with me on 2024.  It was uncomplicated.  His PSA was 5.48 corrected on finasteride.  His volume was 33.33.  He had a PI-RADS 3 lesion on MRI without any lymphadenopathy or bony lesions.  There is also no seminal vesicle or bladder abnormalities.    Pathology returned:    Comment:  For  purposes, a second pathologist has reviewed the region of interest prostate needle biopsy, specimen A (2 slides, 6 levels) and concurs with the above diagnostic interpretation.                 Location Giuseppe Score % of Pattern 4  Grade Group   Positive Cores / Total Cores  Tumor Length (mm)  % Tissue Involved       A. Region of interest 3+4 40 2 1/3 1.0 3    B. Right base                C. Right mid                D. Right apex                E. Left base                F. Left mid                G. Left apex                  His only abdominal surgery is an umbilical hernia repair many years ago.  He does have recurrent umbilical hernia     PAST MEDICAL HISTORY:  Past Medical History:    Calculus of kidney    High blood pressure    High cholesterol    Kidney stones    Other and unspecified hyperlipidemia    Prostatitis    Type II or unspecified type diabetes mellitus without mention of complication, not stated as uncontrolled    Unspecified essential hypertension        PAST SURGICAL HISTORY:  Past Surgical History:   Procedure Laterality Date    Appendectomy       Colonoscopy      Colonoscopy N/A 12/30/2019    Procedure: COLONOSCOPY;  Surgeon: Simon Alvarez MD;  Location: [facility] ENDOSCOPY    Hernia surgery      Thoracotomy,excis bullae      as infant for empyema         ALLERGIES:  Allergies   Allergen Reactions    Ciprofloxacin OTHER (SEE COMMENTS)     Don't remember         MEDICATIONS:  Current Outpatient Medications   Medication Instructions    amLODIPine (NORVASC) 5 mg, Oral, Daily    Ascorbic Acid (VITAMIN C OR) Oral    aspirin 81 mg, Oral, Daily    Coenzyme Q10 (CO Q 10 OR) 1 tablet, Daily    docusate sodium (COLACE) 100 mg, Oral, 2 times daily    finasteride (PROSCAR) 5 mg, Oral, Daily    FreeStyle Lancets Does not apply Misc USE 1  TO CHECK GLUCOSE ONCE DAILY    Glucose Blood (FREESTYLE LITE TEST) In Vitro Strip 1 strip, Other, Daily, Check blood sugar daily    lisinopril (PRINIVIL; ZESTRIL) 40 mg, Oral, Daily    metFORMIN HCl (GLUCOPHAGE) 1,000 mg, Oral, 2 times daily with meals    Multiple Vitamins-Minerals (MULTIVITAMIN MEN OR) Oral    Polyethylene Glycol 3350 (MIRALAX) 17 g, Oral, Daily    rosuvastatin (CRESTOR) 40 mg, Oral, Nightly        FAMILY HISTORY:  Family History   Problem Relation Age of Onset    Heart Disease Father 69        had [address] and [address] way bypass.    Cancer Father         Prostate    Heart Disease Paternal Aunt     Heart Disease Mother     Cancer Other         Prostate    Cancer Paternal Grandfather         prostate cancer        SOCIAL HISTORY:  Social History     Socioeconomic History    Marital status: Single   Tobacco Use    Smoking status: Never    Smokeless tobacco: Never   Vaping Use    Vaping status: Never Used   Substance and Sexual Activity    Alcohol use: No    Drug use: No          PHYSICAL EXAMINATION:  There were no vitals filed for this visit.  CONSTITUTIONAL: No apparent distress, cooperative and communicative  NEUROLOGIC: Alert and oriented   HEAD: Normocephalic, atraumatic   EYES: Sclera non-icteric   ENT: Hearing intact,  moist mucous membranes   NECK: No obvious goiter or masses   RESPIRATORY: Normal respiratory effort, Nonlabored breathing on room air  SKIN: No evident rashes   ABDOMEN: Soft, nontender, nondistended, no rebound tenderness, no guarding, no masses      REVIEW OF SYSTEMS:    A comprehensive 10-point review of systems was completed.  Pertinent positives and negatives are noted in the the HPI.       LABORATORY DATA:  Comment:  For  purposes, a second pathologist has reviewed the region of interest prostate needle biopsy, specimen A (2 slides, 6 levels) and concurs with the above diagnostic interpretation.                 Location Giuseppe Score % of Pattern 4  Grade Group   Positive Cores / Total Cores  Tumor Length (mm)  % Tissue Involved       A. Region of interest 3+4 40 2 1/3 1.0 3    B. Right base                C. Right mid                D. Right apex                E. Left base                F. Left mid                G. Left apex                        IMAGING REVIEW:  Narrative   PROCEDURE: MRI PROSTATE(W+WO) (CPT=72197)     COMPARISON: CT PF RENAL STONE ABD/P WO CON, 9/20/2014, 9:21 PM.  CT ABD PELV W CONTRAST, 7/14/2008, 1:00 PM.  CT ABD PELV W CONTRAST, 7/09/2008, 9:34 AM.  CT ABD PELV W CONTRAST, 7/03/2008, 5:55 PM.  CT ABDOMEN + PELVIS KIDNEYSTONE 2D RNDR (NO IV NO  ORAL) (CPT=741, 5/02/2023, 3:41 PM.  CT UROGRAM(W+WO) W/3D SH(CPT=74178/62803), 11/16/2022, 2:58 PM.     INDICATIONS: Elevated PSA (R97.20).     PSA HISTORY:   2.74 ng/mL (02/03/2024)  3.61 ng/mL (07/19/2023)  2.44 ng/mL (10/04/2022)  1.94 ng/mL (02/10/2022)  1.58 ng/mL (09/09/2021)  1.08 ng/mL (02/08/2020)  0.96 ng/mL (02/27/2019)  0.8 ng/mL (05/18/2016)     TECHNIQUE: A complete multi-planar, multiparametric examination was performed at 3 [place] without use of an endorectal coil to optimize visualization of suspected pathology. Images were obtained both before and after administration of intravenous  gadolinium-based contrast  agent.       FINDINGS:  PROSTATE:  SIZE: The prostate gland measures 4.7 x 3.6 x 3.6 cm, for a calculated volume of 29 cc.  PSA DENSITY: 0.09 ng/mL2       PERIPHERAL ZONE: The peripheral zone is predominantly T2 hyperintense. Heterogeneous areas of hypointense signal are scattered throughout the peripheral zone.  Index Lesion:  At the left anterior aspect of the peripheral zone, there is a well-circumscribed T2 hypointense lesion measuring 0.9 x 1.2 cm (series 750, image 68; series 4, image 24). This appears to have hyperintense signal on the high B value DWI  sequence (series 7, image 67).    TRANSITION ZONE: Numerous well-circumscribed ovoid nodules are seen throughout the transition zone, compatible with benign prostatic hyperplasia. There are no ill-defined, T2 hypointense lesions.     OTHER PELVIC FINDINGS:  SEMINAL VESICLES: Homogeneously T2 hyperintense signal with relatively diminutive/collapsed morphology.    URINARY BLADDER: Incompletely distended without visible calculus. Mild circumferential bladder wall thickening may relate to underdistention.  PELVIC NODES: No lymphadenopathy with short axis measurement greater than 0.8 cm of the para-aortic, paracaval, common iliac, internal iliac, external iliac, obturator, pararectal, presecral, or common femoral chains.    BONES:   No suspicious osseous lesions are demonstrated in the imaged volume. There is high-grade cartilage loss of the hips. Subchondral cystic changes of the acetabula are demonstrated bilaterally. There may be extensive paralabral cyst formation, left   greater than right.  BODY WALL: There is a minute fat-containing umbilical hernia.  OTHER:   Scattered colonic diverticula are present in the sigmoid colon. There is no colonic wall thickening or pericolonic fat stranding. No free fluid is seen in the pelvis.                    Impression   CONCLUSION:  1. PI-RADS CLASSIFICATION:  PI-RADS 3 - Intermediate (the presence of clinically  significant cancer is equivocal).     2. There is an indeterminate lesion at the left aspect of the prostatic apex. Continued ascertainment of quantitative PSA can be considered with follow-up prostatic MRI in 1 year as part of an active surveillance program.     3. No elevation of PSA density.     4. Negative for discernible intrapelvic lymphadenopathy.       5. No suspicious osseous lesions are identified in the imaged volume.     6. Distal colonic diverticulosis without evidence of MR complication in the imaged volume.     7. Lesser incidental findings as above.          Prostate Imaging - Reporting and Data System version 2 (PI-RADS v2) Assessment Categories:     PI-RADS 1: Very low (clinically significant cancer is highly unlikely to be present)  PI-RADS 2: Low (clinically significant cancer is unlikely to be present)  PI-RADS 3: Intermediate (the presence of clinically significant cancer is equivocal)  PI-RADS 4: High (clinically significant cancer is likely to be present)  PI-RADS 5: Very high (clinically significant cancer is likely to be present)            elm-remote.        Dictated by (CST): Baudilio Price MD on 5/02/2024 at 10:51 PM      Finalized by (CST): Baudilio Price MD on 5/02/2024 at 11:02 PM          OTHER RELEVANT DATA:   none     IMPRESSION: Giuseppe 3+4 prostate cancer in 1 of 3 fragments in the 3% of the submitted tissue without any other positive malignancy detected.  Discussed options of decipher testing to help make a treatment decision versus definitive therapy with radiation versus surgical therapy.    I discussed patient's pathology in detail and provided a printed copy of his pathology report. I discussed staging with donovan scan (likely to be negative given normal ALP) and prostate MRI in 6-8 weeks. I discussed radical prostatectomy with +/- pelvic lymph node dissection.     We discussed the procedure which would be a robotic assisted laparoscopic radical prostatectomy with bilateral  pelvic lymph node dissection.  I told patient that he would be asleep for the procedure and would not feel any pain at the time of the surgery.  He would be placed in a dorsolithotomy position and would have 6 small incisions in his belly with the largest incision being above his bellybutton.  The surgery would take approximately 3 to 5 hours in which we would remove the prostate seminal vesicles, part of the vas deferens and +/- pelvic lymph node packets (right and left) which would be sent to pathology for review.     We discussed the potential risks and complications of the surgery.  Risks included bleeding with possible need for blood transfusion, infection, damage to surrounding structures (colon, small intestines, bladder, urethra, blood vessels, transient nerve and muscle injury), need for colostomy, conversion to conventional laparoscopic or open surgery, urinary incontinence, erectile dysfunction, urethrovesical anastomotic leakage requiring prolonged catheter or other intervention, pelvic lymphatic collection, incisional hernia, need for additional procedures, stroke, death.  Pending pathology if there is any adverse features (i.e. positive margins, seminal vesicle invasion, extracapsular extension, detectable PSA) or lymph node metastases he may require adjuvant therapy with external beam radiotherapy +/- ADT versus salvage radiation.     We discussed the postoperative period which would include staying in the hospital typically for 1 night.  He would have a Chicas catheter.  His catheter would remain in place for 7 to 10 days for which he would come to our clinic for removal.     I also reviewed radiation therapy and that it is typically administered over 8 weeks. The main side effects would be worsened urgency, frequency and rectal urgency. Long term side effects usually occur at 10-15 years and are hematuria, significant hematuria requiring operative or hospital intervention, secondary cancers, ED,  urinary incontinence, stricture disease etc. I offered radiation oncology consult - he accepted     I offered second opinion, he declined       PLAN:  Patient is most interested in surgery given his family history and young age-will have patient see my partner Dr. Spain.  I sent him a message  Patient to let me know if he is interested in second opinion outside of this facility  Radiation oncology consult placed per patient's preference  If he is interested in decipher testing for possible active surveillance he knows to let me know    Thank you for referring this very pleasant patient to my clinic. If you have any questions or concerns, please do not hesitate to contact me.    Sincerely,  Piper Earl MD    30 minutes were spent on this patient at this visit obtaining a history, reviewing medical records, developing a treatment plan, counseling and discussing treatment strategy with patient, coordination of care and documentation.     The 21st Century Cures Act makes medical notes available to patients in the interest of transparency.  However, please be advised that this is a medical document.  It is intended as a peer to peer communication.  It is written in medical language and may contain abbreviations or verbiage that are technical and unfamiliar.  It may appear blunt or direct.  Medical documents are intended to carry relevant information, facts as evident, and the clinical opinion of the practitioner.

## 2024-06-21 ENCOUNTER — TELEPHONE (OUTPATIENT)
Dept: SURGERY | Facility: CLINIC | Age: 64
End: 2024-06-21

## 2024-06-21 NOTE — TELEPHONE ENCOUNTER
Patient states he is interested in scheduling the surgery discussed at his 6/17 visit. Please call.

## 2024-06-24 NOTE — TELEPHONE ENCOUNTER
Spoke with patient, per last office visit notes patient referred to  to discuss surgery. I offered patient first available discussion appointment for this Wednesday. Unfortunately due to patients work schedule he is not able to come in between 12 pm-8 pm. I scheduled patient at 9 am for discussion.     Future Appointments   Date Time Provider Department Center   7/10/2024  9:00 AM Jose Angel Turpin MD [facility]       PLAN:  Patient is most interested in surgery given his family history and young age-will have patient see my partner Dr. Spain.  I sent him a message  Patient to let me know if he is interested in second opinion outside of this facility  Radiation oncology consult placed per patient's preference  If he is interested in decipher testing for possible active surveillance he knows to let me know

## 2024-06-24 NOTE — TELEPHONE ENCOUNTER
Pt called requesting to schedule a meeting.   Please call pt    [FreeTextEntry1] : good weight gain  reinforced BF technique rto 1 month of age Continue BF q2-3 hrs

## 2024-07-10 ENCOUNTER — OFFICE VISIT (OUTPATIENT)
Dept: SURGERY | Facility: CLINIC | Age: 64
End: 2024-07-10

## 2024-07-10 DIAGNOSIS — C61 PROSTATE CANCER (HCC): Primary | ICD-10-CM

## 2024-07-10 PROCEDURE — 99215 OFFICE O/P EST HI 40 MIN: CPT | Performed by: UROLOGY

## 2024-07-10 NOTE — PROGRESS NOTES
[facility] Urologic Oncology  Initial Office Consultation    HPI:   Mario Loera is a 64 year old male here today for consultation at the request of, and a copy of this note will be sent to, Jossue Lui DO.  His sister was on the phone.    1.  Clinically localized favorable intermediate risk prostate cancer (cT1c cN0 Mx)  Family history of prostate cancer in his father and paternal uncle.    Has been on finasteride for many years for BPH.  PSA from February 2024 elevated at 5.48 ng/mL when corrected for finasteride use.    Seen by Dr. Earl.  Prostate MRI revealing a prostate volume of 33 mL.  PI-RADS 3 lesion in the left peripheral zone apex.  No lymphadenopathy.    he underwent a UroNav MRI-US fusion prostate biopsy on 6/13/2024 which revealed grade group 2 ([place] 3+4 =7) prostate cancer in 1/3 cores from the W[facility], with 3% involvement.  12 standard cores negative for malignancy.    He was counseled on his options and presents for an opinion regarding surgery.    He denies any significant baseline LUTS.  His IPSS is 4 with a quality-of-life score of 2.    He denies any significant erectile dysfunction.    No gross hematuria or dysuria.  He has history of nephrolithiasis for which he underwent ureteroscopy with laser lithotripsy in 2022.  Currently denies any flank pain.      PAST MEDICAL HISTORY: Hypertension.  Hyperlipidemia.  DM.  Nephrolithiasis.  Prostate cancer 6/2024..    PAST SURGICAL HISTORY: Laparoscopic appendectomy and umbilical hernia repair.  Thoracotomy as an infant.    SOCIAL HISTORY: Single.  Has 1 son.  No smoking or illicit drug use.  Rare social alcohol.  Works as a cook at [employer].     Family History   Problem Relation Age of Onset    Heart Disease Father 69        had [address] and [address] way bypass.    Cancer Father         Prostate    Heart Disease Paternal Aunt     Heart Disease Mother     Cancer Other         Prostate    Cancer Paternal Grandfather          prostate cancer     Allergies: Ciprofloxacin      REVIEW OF SYSTEMS:  Pertinent positives and negatives per HPI. A 12-point ROS was performed and is otherwise negative.       EXAM:  There were no vitals taken for this visit.    Physical Exam  Constitutional:       Appearance: He is well-developed.   HENT:      Head: Normocephalic.   Eyes:      General: No scleral icterus.  Cardiovascular:      Rate and Rhythm: Normal rate.   Pulmonary:      Effort: Pulmonary effort is normal.   Abdominal:      General: There is no distension.      Palpations: Abdomen is soft.      Tenderness: There is no abdominal tenderness.      Hernia: A hernia (umbilical, reducible.) is present.   Genitourinary:     Penis: Circumcised.       Testes: Normal.      Comments: VIV revealing a 30 g prostate, smooth, symmetrical, nontender, nonnodular.  Skin:     General: Skin is warm and dry.   Neurological:      Mental Status: He is alert and oriented to person, place, and time.   Psychiatric:         Mood and Affect: Mood normal.         Behavior: Behavior normal.       LABS:     PSA Screen   Latest Ref Rng <=4.00    5/18/2016 0.8   1/17/2018 0.8   2/27/2019 0.96    2/8/2020 1.08    9/9/2021 1.58    2/10/2022 1.94    10/4/2022 2.44    7/19/2023 3.61    2/3/2024 2.74        PATHOLOGY:    Provider:  Piper Earl MD      Collected:           06/13/2024         Location [place] Score % of Pattern 4  Grade Group   Positive Cores / Total Cores  Tumor Length (mm)  % Tissue Involved       A. Region of interest 3+4 40 2 1/3 1.0 3    B. Right base                C. Right mid                D. Right apex                E. Left base                F. Left mid                G. Left apex                   IMAGING:    MRI Prostate (5/1/24):     1. PI-RADS CLASSIFICATION:  PI-RADS 3 - Intermediate (the presence of clinically significant cancer is equivocal).      2. There is an indeterminate lesion at the left aspect of the prostatic apex. Continued  ascertainment of quantitative PSA can be considered with follow-up prostatic MRI in 1 year as part of an active surveillance program.      3. No elevation of PSA density.      4. Negative for discernible intrapelvic lymphadenopathy.       5. No suspicious osseous lesions are identified in the imaged volume.      6. Distal colonic diverticulosis without evidence of MR complication in the imaged volume.      7. Lesser incidental findings as above.         IMPRESSION:  64 year old male with family history of prostate cancer.  Patient diagnosed with clinically localized favorable intermediate risk prostate cancer upon UroNav prostate biopsy performed 6/13/2024 for evaluation of elevated screening PSA levels.    I discussed with the patient and accompanying family members my clinical impression of his diagnosis of adenocarcinoma of the prostate. We reviewed the clinical staging system for prostate cancer and the prognostic significance of biopsy pathology findings (Giuseppe score), clinical stage, and serum PSA in predicting both pathologic stage found at surgery as well as long-term outcomes following surgical or radiation therapy. We discussed how these data can be used to stratify patients as low-risk, intermediate-risk and high-risk for posttherapy failure and can direct choice of treatment appropriate to their risk assessment.    I reviewed the primary modes of treatment for prostate cancer: observation (Active Surveillance vs. watchful waiting), radiation therapy, radical prostatectomy and hormonal therapy.      Regarding Active Surveillance, they understand this is ideally suited for men with either clinically insignificant or slow growing cancer that meets strict criteria or for whom because of age or other concurrent medical conditions the risks of treatment are greater than the potential benefits of treatment. While this option avoids treatment-associated side effects, it does require frequent evaluation  including blood tests, imaging studies (e.g. multiparametric prostate MRI), and repeat biopsies in order to monitor for potential disease progression. Patient understands that despite vigilant follow-up there will be cases where the cancer spreads and is no longer potentially curable with local therapies. I reiterated that for many men, Active Surveillance can be associated with increased anxiety and stress concerning their diagnosis.  If patient were to pursue or consider active surveillance, then I would recommend sending his prostate biopsy tissue for decipher genomic testing to establish his candidacy for active surveillance.    Regarding radiation therapy, we discussed the general categories of external beam radiotherapy (XRT) and interstitial seed implantation (brachytherapy), which may be delivered with or without adjuvant hormonal therapy depending on clinical circumstances.  For XRT we discussed the different modes of delivery and general risks and benefits between conventional, 3-D conformal, cyberknife and intensity modulated radiotherapy. For brachytherapy I described high dose rate and permanent seed implantation. They understand that specific radiotherapy approaches have not shown equivalent outcomes for all risk groups, and, for example, concerning intermediate or high-risk patients, brachytherapy is generally considered inferior to XRT and not recommended for monotherapy. I encouraged the patient to pursue consultation with a member of our radiation oncology department to receive their specific recommendations for radiation therapy based on the specifics of his diagnosis.    Regarding surgery, I explained the operation that I primarily perform, robotic-assisted laparoscopic radical prostatectomy, and compared it with the alternative traditional open retropubic prostatectomy. I also mentioned the less commonly used but accepted perineal approach. The advantages and limitations of these various  approaches were described in detail. The anticipated hospital and post-operative courses were reviewed. I highlighted the relevant anatomy, and we discussed the importance of neurovascular bundle preservation (nerve-sparing) to minimize negative effects on erectile function. I explained that concurrent pelvic lymphadenectomy is typically performed for higher risk disease, which serves both a diagnostic and potentially therapeutic purpose if metastatic disease is identified. While in the majority of cases bilateral nerve-sparing is appropriate and possible, they understand that in certain circumstances intentional partial or complete unilateral or bilateral neurovascular bundle resection is necessary, especially in cases where clinically and /or radiographically (MP-MRI) the surgeon has suspicion that cancer extends into that region. This is typically determined preoperatively, however at times intraoperatively surgical adjustments need to be made. We discussed the general risks of surgery, which include but are not limited to infection, bleeding, medical and anesthetic complications, and adjacent organ involvement or injury(rectum, bowel, nerves, blood vessels). We discussed the common specific risks of prostatectomy, which include stress urinary incontinence (which is commonly mild but which can also be severe in rare circumstances); erectile dysfunction (which may occur despite bilateral nerve-sparing); and the need for blood transfusion (which at present is an uncommon event with robotic prostatectomy). We also discussed the risk of bladder neck contracture, vesicourethral anastomotic leak, lymphocele formation, and positioning related injuries.       We then discussed the significance of adverse pathologic findings upon radical prostatectomy as well as the indications for and concept of adjuvant and salvage radiation therapy following radical prostatectomy.     We discussed the results of the [facility] pre-radical  prostatectomy nomogram pertaining to the specifics of their diagnosis.    Regarding hormonal therapy, we discussed it is primarily used as systemic therapy for patients with advanced or metastatic disease, but that it also has been shown to be beneficial in conjunction with radiation therapy for certain categories of patients. The patient understands that hormonal therapy alone is not considered curative treatment and will slow the growth of but not eliminate prostate cancer.    We discussed that for localized prostate cancer there is no single best treatment. The recommendations for therapy take into account additional non-cancer related patient factors, such as prostate size, baseline voiding status, baseline sexual function, as well as one's individual priorities, such as willingness to accept potential sexual dysfunction to minimize voiding bother, or vice-versa.      They understand the final choice of therapy is based on an assessment of the relative medical risks and benefits of each treatment as well as the potential quality of life impacts specific to each treatment and their personal tolerance of these potential risks and impacts.  I emphasized that regardless of which definitive treatment is selected, alterations in quality of life and function are expected and inevitable.      The treatment options for his clinically localized prostate cancer were discussed per the latest NCCN guidelines, including:  - Active surveillance  - Radical prostatectomy  - Radiation therapy    Patient verbalized understanding.  All his questions were answered.  He remains undecided at this time.  He will consider the available management options and further discuss with his family.  He will let us know of his finalized treatment intentions as soon as he reaches a decision.      PLAN:  1.  Patient will consider available management options for his clinically localized favorable intermediate risk prostate cancer.  He will further  discuss with his family.  He will let us know of his finalized treatment intentions as soon as he reaches a decision.  We will proceed accordingly.    40 mintues were spent on this visit including face-to-face discussion involving counseling, further management and treatment planning, reviewing testing and interpreting imaging results, ordering new testing, and documenting in patient's medical record.    Jose Angel Turpin MD  7/10/2024

## 2024-10-21 ENCOUNTER — TELEPHONE (OUTPATIENT)
Dept: SURGERY | Facility: CLINIC | Age: 64
End: 2024-10-21

## 2024-10-21 ENCOUNTER — TELEPHONE (OUTPATIENT)
Dept: FAMILY MEDICINE CLINIC | Facility: CLINIC | Age: 64
End: 2024-10-21

## 2024-10-21 DIAGNOSIS — R39.89 ABNORMAL URINE COLOR: ICD-10-CM

## 2024-10-21 DIAGNOSIS — C61 PROSTATE CANCER (HCC): Primary | ICD-10-CM

## 2024-10-21 DIAGNOSIS — Z79.01 ANTICOAGULATED: ICD-10-CM

## 2024-10-21 DIAGNOSIS — Z01.818 PRE-OP TESTING: ICD-10-CM

## 2024-10-21 NOTE — TELEPHONE ENCOUNTER
Patient was calling to schedule an office visit with Dr Cheatham but next opening is until December, patient would like to know if he will need an appointment or if Dr Cheatham can proceed with surgery date.

## 2024-10-22 ENCOUNTER — TELEPHONE (OUTPATIENT)
Dept: SURGERY | Facility: CLINIC | Age: 64
End: 2024-10-22

## 2024-10-22 DIAGNOSIS — C61 PROSTATE CANCER (HCC): Primary | ICD-10-CM

## 2024-10-22 DIAGNOSIS — Z01.818 PRE-OP TESTING: ICD-10-CM

## 2024-10-22 NOTE — TELEPHONE ENCOUNTER
Patient contacted per Amal and was offered surgery dates of November 1,2024 November 19th and November 22. Per patient he would like to hold off until December. SO I have  scheduled patient for December 3 rd, 2024 at 8:30 am start time. Patient was told to have labs done 7-10 days prior to surgery. I have mail out surgery informations and reminders to patient since he stated he didn't have MyChart. Labs ordered, Surgery scheduled. Letter mailed

## 2024-10-22 NOTE — TELEPHONE ENCOUNTER
Urology Surgery Scheduling Request    Location: [facility] OR    Surgeon: JORDI Turpin MD    Asst. Surgeon:  Preferred list     Diagnosis: Prostate cancer     Procedure: Robot-assisted laparoscopic radical prostatectomy, bilateral pelvic lymph node dissection,     Procedure CPT Code (if known): 28956, 77321.    Anesthesia: General     Time Frame: 2 weeks     Time needed: 5 hours    Special Equipment: XI Robot    On Call to OR: Ancef 2 grams IV and Flagyl 500 mg IV.     Admission: AM Admit    Pre-op Testing: CBC, BMP, PT/INR, PTT, EKG, Urinalysis, and Urine Culture. TYPE AND SCREEN.     Need Pre-op Clearance: PCP    Estimated Post Op/Follow Up Appt: 7-10 days with CHIQUITA for epps removal.    Meg Lynn PA-C  10/22/2024     Jose Angel Turpin MD  10/22/2024

## 2024-10-22 NOTE — TELEPHONE ENCOUNTER
Regla Sun just wanted to inform you that  is currently under Dr.Hussaini decker and is scheduled for Xi robot-assisted radical prostatectomy, bilateral pelvic lymph node dissection  for 12/3/2024 and is in need to get a Medical Clearance.

## 2024-10-23 ENCOUNTER — TELEPHONE (OUTPATIENT)
Dept: FAMILY MEDICINE CLINIC | Facility: CLINIC | Age: 64
End: 2024-10-23

## 2024-10-23 RX ORDER — ROSUVASTATIN CALCIUM 40 MG/1
40 TABLET, COATED ORAL NIGHTLY
Qty: 90 TABLET | Refills: 0 | Status: SHIPPED | OUTPATIENT
Start: 2024-10-23 | End: 2024-10-26

## 2024-10-23 RX ORDER — LANCETS 28 GAUGE
1 EACH MISCELLANEOUS DAILY
Qty: 100 EACH | Refills: 0 | Status: SHIPPED | OUTPATIENT
Start: 2024-10-23

## 2024-10-23 RX ORDER — FINASTERIDE 5 MG/1
5 TABLET, FILM COATED ORAL DAILY
Qty: 90 TABLET | Refills: 0 | Status: SHIPPED | OUTPATIENT
Start: 2024-10-23 | End: 2024-10-26

## 2024-10-23 RX ORDER — AMLODIPINE BESYLATE 5 MG/1
5 TABLET ORAL DAILY
Qty: 90 TABLET | Refills: 0 | Status: SHIPPED | OUTPATIENT
Start: 2024-10-23 | End: 2024-10-26

## 2024-10-23 RX ORDER — LISINOPRIL 40 MG/1
40 TABLET ORAL DAILY
Qty: 90 TABLET | Refills: 0 | Status: SHIPPED | OUTPATIENT
Start: 2024-10-23 | End: 2024-10-26

## 2024-10-23 NOTE — TELEPHONE ENCOUNTER
Patient is scheduled for surgery on    12/3/24    Dr. Jose Angel Turpin  Urology    [facility]    Prostate Robotic procedure    Telephone:  [phone]      Patient mentions he received a call to schedule the pre-op clearance appointment with SOHAN Bailey

## 2024-10-23 NOTE — TELEPHONE ENCOUNTER
Refill passed per [facility] protocol.  Requested Prescriptions   Pending Prescriptions Disp Refills    ROSUVASTATIN 40 MG Oral Tab [Pharmacy Med Name: ROSUVASTATIN TABS 40MG] 90 tablet 3     Sig: TAKE 1 TABLET NIGHTLY (NEED FOLLOW UP APPOINTMENT BEFORE NEXT REFILL IS DUE)       Cholesterol Medication Protocol Passed - 10/23/2024  7:50 AM        Passed - ALT < 80     Lab Results   Component Value Date    ALT 25 02/03/2024             Passed - ALT resulted within past year        Passed - Lipid panel within past 12 months     Lab Results   Component Value Date    CHOLEST 97 02/03/2024    TRIG 47 02/03/2024    HDL 62 (H) 02/03/2024    LDL 23 02/03/2024    VLDL 6 02/03/2024    TCHDLRATIO 2.9 06/29/2017    NONHDLC 35 02/03/2024             Passed - In person appointment or virtual visit in the past 12 mos or appointment in next 3 mos     Recent Outpatient Visits              3 months ago [facility]state [facility]    Office Visit    4 months ago [facility]    [facility]    5 months ago Elevated PSA    [facility]    Office Visit    7 months ago [facility]    [facility]    8 months ago Well adult exam    [facility] Lexi Simmons APRN    Office Visit          Future Appointments         Provider Department Appt Notes    In 1 month Eulalia Allison APRN [facility] post op epps removal [facility]                      METFORMIN HCL 1000 MG Oral Tab [Pharmacy Med Name: METFORMIN HCL TABS 1000MG] 180 tablet 3     Sig: TAKE 1 TABLET TWICE A DAY WITH MEALS       Diabetes Medication Protocol Failed - 10/23/2024  7:50 AM        Failed -  Last A1C < 7.5 and within past 6 months     Lab Results   Component Value Date    A1C 6.7 (H) 02/03/2024             Failed - In person appointment or virtual visit in the past 6 mos or appointment in next 3 mos     Recent Outpatient Visits              3 months ago [facility]state [facility]    [facility]    4 months ago Prostate cancer ([facility])    [facility] Piper Haywood [facility]    Office Visit    5 months ago [facility] PSA    [facility]    [facility]    7 months ago [facility]    [facility] Visit    8 months ago Well adult exam    [facility], Lexi Simmons APRN    Office Visit          Future Appointments         Provider Department Appt Notes    In 1 month [facility]pps [facility]                    Passed - Microalbumin procedure in past 12 months or taking ACE/ARB        Passed - EGFRCR or GFRNAA > 50     GFR Evaluation  EGFRCR: 72 , resulted on 2/3/2024          Passed - GFR in the past 12 months          AMLODIPINE 5 MG Oral Tab [Pharmacy Med Name: AMLODIPINE BESYLATE TABS 5MG] 90 tablet 3     Sig: TAKE 1 TABLET DAILY       Hypertension Medications Protocol Passed - 10/23/2024  7:50 AM        Passed - CMP or BMP in past 12 months        Passed - Last BP reading less than 140/90     BP Readings from Last 1 Encounters:   02/09/24 128/71               Passed - In person appointment or virtual visit in the past 12 mos or appointment in next 3 mos     Recent Outpatient Visits              3 months ago Prostate cancer ([facility])    [facility], Jose Angel Aquino MD    Office Visit    4 months ago Prostate  cancer (HCC)    [facility], Piper Haywood MD    Office Visit    5 months ago Elevated PSA    [facility], Piper Haywood MD    Office Visit    7 months ago Elevated PSA    [facility], Piper Haywood MD    Office Visit    8 months ago Well adult exam    [facility], Lexi Simmons APRN    Office Visit          Future Appointments         Provider Department Appt Notes    In 1 month [facility]                    Passed - EGFRCR or GFRNAA > 50     GFR Evaluation  EGFRCR: 72 , resulted on 2/3/2024            LISINOPRIL 40 MG Oral Tab [Pharmacy Med Name: LISINOPRIL TABS 40MG] 90 tablet 3     Sig: TAKE 1 TABLET DAILY       Hypertension Medications Protocol Passed - 10/23/2024  7:50 AM        Passed - CMP or BMP in past 12 months        Passed - Last BP reading less than 140/90     BP Readings from Last 1 Encounters:   02/09/24 128/71               Passed - In person appointment or virtual visit in the past 12 mos or appointment in next 3 mos     Recent Outpatient Visits              3 months ago Prostate cancer (HCC)    [facility], Jose Angel Aquino MD    Office Visit    4 months ago Prostate cancer (HCC)    [facility], Piper Haywood MD    Office Visit    5 months ago [facility] PSA    [facility]    [facility] Visit    7 months ago [facility] PSA    [facility]    [facility] Visit    8 months ago Well adult exam    [facility], Lexi Simmons APRN    Office Visit          Future Appointments         Provider  Department Appt Notes    In 1 month [facility]                    Passed - EGFRCR or GFRNAA > 50     GFR Evaluation  EGFRCR: 72 , resulted on 2/3/2024            FREESTYLE LANCETS Does not apply Misc [Pharmacy Med Name: LANCETS FREESTYLE 100'S 28G] 100 each 3     Sig: USE 1 LANCET TO CHECK GLUCOSE DAILY       Diabetic Supplies Protocol Passed - 10/23/2024  7:50 AM        Passed - In person appointment or virtual visit in the past 12 mos or appointment in next 3 mos     Recent Outpatient Visits              3 months ago [facility] [place] [facility]    [facility]    4 months ago Prostate cancer (HCC)    [facility], Piper Haywood MD    Office Visit    5 months ago Elevated PSA    [facility], Piper Haywood MD    Office Visit    7 months ago [facility]vated PSA    [facility]    [facility] Visit    8 months ago Well adult exam    [facility], Lexi Simmons APRN    Office Visit          Future Appointments         Provider Department Appt Notes    In 1 month [facility]pps [facility]                      FINASTERIDE 5 MG Oral Tab [Pharmacy Med Name: FINASTERIDE TABS 5MG] 90 tablet 3     Sig: TAKE 1 TABLET DAILY       Genitourinary Medications Passed - 10/23/2024  7:50 AM        Passed - Patient does not have pulmonary hypertension on problem list        Passed - In person appointment or virtual visit in the past 12 mos or appointment in next 3 mos     Recent Outpatient Visits              3 months ago [facility]state [facility]     [facility]    4 months ago Prostate cancer (HCC)    [facility], Piper Haywood MD    Office Visit    5 months ago Elevated PSA    [facility], Piper Haywood MD    Office Visit    7 months ago Elevated PSA    [facility], Piper Haywood MD    Office Visit    8 months ago Well adult exam    [facility], Lexi Simmons APRN    Office Visit          Future Appointments         Provider Department Appt Notes    In 1 month Eulalia Allison APRN [facility] [place] post op epps removal [facility]                       Recent Outpatient Visits              3 months ago Prostate cancer (HCC)    [facility], Jose Angel Aquino MD    Office Visit    4 months ago Prostate cancer (HCC)    [facility], Piper Haywood MD    Office Visit    5 months ago Elevated PSA    [facility], Piper Haywood MD    Office Visit    7 months ago Elevated PSA    [facility], Piper Haywood MD    Office Visit    8 months ago Well adult exam    [facility], Lexi Simmons APRN    Office Visit          Future Appointments         Provider Department Appt Notes    In 1 month Eulalia Allison APRN [facility], [place] post op epps removal [facility]

## 2024-10-23 NOTE — TELEPHONE ENCOUNTER
Please review; protocol failed/ has no protocol    Message sent for Call Center to call  patient to make an appointment.     Requested Prescriptions   Pending Prescriptions Disp Refills    metFORMIN HCl 1000 MG Oral Tab [Pharmacy Med Name: METFORMIN HCL TABS 1000MG] 180 tablet 0     Sig: Take 1 tablet (1,000 mg total) by mouth 2 (two) times daily with meals.       Diabetes Medication Protocol Failed - 10/23/2024  7:55 AM        Failed - Last A1C < 7.5 and within past 6 months     Lab Results   Component Value Date    A1C 6.7 (H) 02/03/2024             Failed - In person appointment or virtual visit in the past 6 mos or appointment in next 3 mos     Recent Outpatient Visits              3 months ago Prostate cancer (HCC)    [facility], Jose Angel Aquino MD    Office Visit    4 months ago Prostate cancer (HCC)    [facility], Piper Haywood MD    Office Visit    5 months ago Elevated PSA    [facility], Piper Haywood MD    Office Visit    7 months ago [facility]    Office Visit    8 months ago Well adult exam    [facility], Lexi Simmons APRN    Office Visit          Future Appointments         Provider Department Appt Notes    In 1 month [facility] post [facility] epps removal [facility]                    Passed - Microalbumin procedure in past 12 months or taking ACE/ARB        Passed - EGFRCR or GFRNAA > 50     GFR Evaluation  EGFRCR: 72 , resulted on 2/3/2024          Passed - GFR in the past 12 months         Signed Prescriptions Disp Refills    rosuvastatin 40 MG Oral Tab 90 tablet 0     Sig: Take 1 tablet (40 mg total) by mouth at bedtime.       Cholesterol Medication Protocol Passed - 10/23/2024  7:55 AM         Passed - ALT < 80     Lab Results   Component Value Date    ALT 25 02/03/2024             Passed - ALT resulted within past year        Passed - Lipid panel within past 12 months     Lab Results   Component Value Date    CHOLEST 97 02/03/2024    TRIG 47 02/03/2024    HDL 62 (H) 02/03/2024    LDL 23 02/03/2024    VLDL 6 02/03/2024    TCHDLRATIO 2.9 06/29/2017    NONHDLC 35 02/03/2024             Passed - In person appointment or virtual visit in the past 12 mos or appointment in next 3 mos     Recent Outpatient Visits              3 months ago Prostate cancer (HCC)    [facility], Jose Angel Aquino MD    Office Visit    4 months ago Prostate cancer (HCC)    [facility], Piper Haywood MD    Office Visit    5 months ago Elevated PSA    [facility], Piper Haywood MD    Office Visit    7 months ago Elevated PSA    [facility] Piper Haywood MD    Office Visit    8 months ago Well adult exam    [facility] [place] [facility], Lexi Simmons APRN    Office Visit          Future Appointments         Provider Department Appt Notes    In 1 month Eulalia Allison APRN [facility], Katelyn post op epps removal ZH                      amLODIPine 5 MG Oral Tab 90 tablet 0     Sig: Take 1 tablet (5 mg total) by mouth daily.       Hypertension Medications Protocol Passed - 10/23/2024  7:55 AM        Passed - CMP or BMP in past 12 months        Passed - Last BP reading less than 140/90     BP Readings from Last 1 Encounters:   02/09/24 128/71               Passed - In person appointment or virtual visit in the past 12 mos or appointment in next 3 mos     Recent Outpatient Visits              3 months ago Prostate cancer (HCC)    [facility], Katelyn Turpin  MD Jose Angel    Office Visit    4 months ago Prostate cancer (HCC)    [facility], Piper Haywood MD    Office Visit    5 months ago Elevated PSA    [facility], Piper Haywood MD    Office Visit    7 months ago Elevated PSA    [facility], Piper Haywood MD    Office Visit    8 months ago Well adult exam    [facility], Lexi Simmons APRN    Office Visit          Future Appointments         Provider Department Appt Notes    In 1 month [facility]                    Passed - EGFRCR or GFRNAA > 50     GFR Evaluation  EGFRCR: 72 , resulted on 2/3/2024            lisinopril 40 MG Oral Tab 90 tablet 0     Sig: Take 1 tablet (40 mg total) by mouth daily.       Hypertension Medications Protocol Passed - 10/23/2024  7:55 AM        Passed - CMP or BMP in past 12 months        Passed - Last BP reading less than 140/90     BP Readings from Last 1 Encounters:   02/09/24 128/71               Passed - In person appointment or virtual visit in the past 12 mos or appointment in next 3 mos     Recent Outpatient Visits              3 months ago Prostate cancer (HCC)    [facility]    Office Visit    4 months ago Prostate cancer (HCC)    [facility], Piper Haywood MD    Office Visit    5 months ago Elevated PSA    [facility]    Office Visit    7 months ago Elevated PSA    [facility]    Office Visit    8 months ago Well adult exam    [facility], Lexi Simmons APRN    Office Visit          Future  Appointments         Provider Department Appt Notes    In 1 month Eulalia Allison APRN [facility] post op epps removal ZH                    Passed - EGFRCR or GFRNAA > 50     GFR Evaluation  EGFRCR: 72 , resulted on 2/3/2024            FreeStyle Lancets Does not apply Misc 100 each 0     Si Lancet by Finger stick route daily.       Diabetic Supplies Protocol Passed - 10/23/2024  7:55 AM        Passed - In person appointment or virtual visit in the past 12 mos or appointment in next 3 mos     Recent Outpatient Visits              3 months ago Prostate cancer (HCC)    [facility], Jose Angel Aquino MD    Office Visit    4 months ago Prostate cancer (HCC)    [facility], Piper Haywood MD    Office Visit    5 months ago Elevated PSA    [facility], Piper Haywood MD    Office Visit    7 months ago Elevated PSA    [facility], Piper Haywood MD    Office Visit    8 months ago Well adult exam    [facility], Lexi Simmons APRN    Office Visit          Future Appointments         Provider Department Appt Notes    In 1 month [facility]pps [facility]                      finasteride 5 MG Oral Tab 90 tablet 0     Sig: Take 1 tablet (5 mg total) by mouth daily.       Genitourinary Medications Passed - 10/23/2024  7:55 AM        Passed - Patient does not have pulmonary hypertension on problem list        Passed - In person appointment or virtual visit in the past 12 mos or appointment in next 3 mos     Recent Outpatient Visits              3 months ago Prostate cancer (HCC)    [facility], Jose Angel Aquino MD    Office Visit    4 months ago  Prostate cancer (HCC)    [facility], Piper Haywood MD    Office Visit    5 months ago Elevated PSA    [facility], Piper Haywood MD    Office Visit    7 months ago Elevated PSA    [facility], Piper Haywood MD    Office Visit    8 months ago Well adult exam    [facility], [facility], Lexi Simmons APRN    Office Visit          Future Appointments         Provider Department Appt Notes    In 1 month Eulalia Allison APRN [facility] [place] post op epps removal ZH                       Recent Outpatient Visits              3 months ago Prostate cancer (HCC)    [facility], Jose Angel Aquino MD    Office Visit    4 months ago Prostate cancer (HCC)    [facility], Piper Haywood MD    Office Visit    5 months ago Elevated PSA    [facility], Piper Haywood MD    Office Visit    7 months ago Elevated PSA    [facility], Piper Haywood MD    Office Visit    8 months ago Well adult exam    [facility], Lexi Simmons APRN    Office Visit          Future Appointments         Provider Department Appt Notes    In 1 month Eulalia Allison APRN [facility], [place] post op epps removal ZH

## 2024-10-23 NOTE — TELEPHONE ENCOUNTER
Please call patient to make an appointment, 90 day refill sent ,and [employer] message sent,thank you.

## 2024-10-23 NOTE — TELEPHONE ENCOUNTER
Patient called and says someone called him from Dr. Lui's office. There are no encounters regarding a phone call.

## 2024-10-26 RX ORDER — LISINOPRIL 40 MG/1
40 TABLET ORAL DAILY
Qty: 10 TABLET | Refills: 0 | Status: SHIPPED | OUTPATIENT
Start: 2024-10-26

## 2024-10-26 RX ORDER — FINASTERIDE 5 MG/1
5 TABLET, FILM COATED ORAL DAILY
Qty: 10 TABLET | Refills: 0 | Status: SHIPPED | OUTPATIENT
Start: 2024-10-26

## 2024-10-26 RX ORDER — ROSUVASTATIN CALCIUM 40 MG/1
40 TABLET, COATED ORAL NIGHTLY
Qty: 10 TABLET | Refills: 0 | Status: SHIPPED | OUTPATIENT
Start: 2024-10-26

## 2024-10-26 RX ORDER — AMLODIPINE BESYLATE 5 MG/1
5 TABLET ORAL DAILY
Qty: 10 TABLET | Refills: 0 | Status: SHIPPED | OUTPATIENT
Start: 2024-10-26

## 2024-10-26 NOTE — TELEPHONE ENCOUNTER
REFILL PASSED PER [facility] PROTOCOLS    Requested Prescriptions   Pending Prescriptions Disp Refills    amLODIPine 5 MG Oral Tab 30 tablet 0     Sig: Take 1 tablet (5 mg total) by mouth daily.       Hypertension Medications Protocol Passed - 10/26/2024 10:11 AM        Passed - CMP or BMP in past 12 months        Passed - Last BP reading less than 140/90     BP Readings from Last 1 Encounters:   02/09/24 128/71               Passed - In person appointment or virtual visit in the past 12 mos or appointment in next 3 mos     Recent Outpatient Visits              3 months ago [facility]    Office Visit    4 months ago Prostate cancer (HCC)    [facility], Piper Haywood MD    Office Visit    5 months ago Elevated PSA    [facility], Piper Haywood MD    Office Visit    7 months ago Elevated PSA    [facility], Piper Haywood MD    Office Visit    8 months ago Well adult exam    [facility] Lexi Mccarty APRN    Office Visit          Future Appointments         Provider Department Appt Notes    In 1 week Lexi Mccarty APRN [facility] surgery 12/3/24  Policy advised    In 1 month Eulalia Allison APRN [place] post op epps removal ZH                    Passed - EGFRCR or GFRNAA > 50     GFR Evaluation  EGFRCR: 72 , resulted on 2/3/2024            finasteride 5 MG Oral Tab 30 tablet 0     Sig: Take 1 tablet (5 mg total) by mouth daily.       Genitourinary Medications Passed - 10/26/2024 10:11 AM        Passed - Patient does not have pulmonary hypertension on problem list        Passed - In person appointment or virtual visit in the past 12 mos or appointment in  next 3 mos     Recent Outpatient Visits              3 months ago Prostate cancer (HCC)    [facility] [place] [place] [facility]    Office Visit    4 months ago Prostate cancer (HCC)    [facility], Piper Haywood MD    Office Visit    5 months ago Elevated PSA    [facility], Piper Haywood MD    Office Visit    7 months ago Elevated PSA    [facility] [place] [place] [facility]    Office Visit    8 months ago Well adult exam    [facility] Lexi Mccarty APRN    Office Visit          Future Appointments         Provider Department Appt Notes    In 1 week Lexi Mccarty APRN [facility] surgery 12/3/24  Policy advised    In 1 month [facility]                      lisinopril 40 MG Oral Tab 30 tablet 0     Sig: Take 1 tablet (40 mg total) by mouth daily.       Hypertension Medications Protocol Passed - 10/26/2024 10:11 AM        Passed - CMP or BMP in past 12 months        Passed - Last BP reading less than 140/90     BP Readings from Last 1 Encounters:   02/09/24 128/71               Passed - In person appointment or virtual visit in the past 12 mos or appointment in next 3 mos     Recent Outpatient Visits              3 months ago Prostate cancer (HCC)    [facility], Jose Angel Aquino MD    Office Visit    4 months ago Prostate cancer (HCC)    [facility], Piper Haywood MD    Office Visit    5 months ago [facility]vated PSA    [facility]    Office Visit    7 months ago [facility]vated PSA    [facility]    Office Visit    8 months ago Well adult exam    [facility] Lexi Mccarty APRN    Office Visit          Future Appointments         Provider Department Appt Notes    In 1 week Lexi Mccarty APRN [facility] surgery 12/3/24  Policy advised    In 1 month Eulalia Allison APRN [facility] post op epps removal ZH                    Passed - EGFRCR or GFRNAA > 50     GFR Evaluation  EGFRCR: 72 , resulted on 2/3/2024            rosuvastatin 40 MG Oral Tab 30 tablet 0     Sig: Take 1 tablet (40 mg total) by mouth at bedtime.       Cholesterol Medication Protocol Passed - 10/26/2024 10:11 AM        Passed - ALT < 80     Lab Results   Component Value Date    ALT 25 02/03/2024             Passed - ALT resulted within past year        Passed - Lipid panel within past 12 months     Lab Results   Component Value Date    CHOLEST 97 02/03/2024    TRIG 47 02/03/2024    HDL 62 (H) 02/03/2024    LDL 23 02/03/2024    VLDL 6 02/03/2024    TCHDLRATIO 2.9 06/29/2017    NONHDLC 35 02/03/2024             Passed - In person appointment or virtual visit in the past 12 mos or appointment in next 3 mos     Recent Outpatient Visits              3 months ago [facility]    [facility]    4 months ago [facility]state cancer (HCC)    [facility]    Office Visit    5 months ago Elevated PSA    [facility]    Office Visit    7 months ago Elevated PSA    [facility]    Office Visit    8 months ago Well adult exam    [facility] Lexi Mccarty APRN    Office Visit          Future  Appointments         Provider Department Appt Notes    In 1 week Lexi Mccarty APRN [facility], [facility] 12/3/24  Policy advised    In 1 month Eulalia Allison APRN [facility], [facility] [place] post op epps removal ZH                         Future Appointments         Provider Department Appt Notes    In 1 week Lexi Mccarty APRN [facility] 12/3/24  Policy advised    In 1 month Eulalia Allison APRN [facility], [facility] [place] post op epps removal ZH          Recent Outpatient Visits              3 months ago Prostate cancer (HCC)    [facility], Jose Angel Aquino MD    Office Visit    4 months ago Prostate cancer (HCC)    [facility], Piper Haywood MD    Office Visit    5 months ago Elevated PSA    [facility], Piper Haywood MD    Office Visit    7 months ago Elevated PSA    [facility], Piper Haywood MD    Office Visit    8 months ago Well adult exam    [facility] Lexi Mccarty APRN    Office Visit

## 2024-10-26 NOTE — TELEPHONE ENCOUNTER
Patient is requesting refills for finasteride 5 MG Oral Tab , amLODIPine 5 MG Oral Tab , lisinopril 40 MG Oral Tab , and rosuvastatin 40 MG Oral Tab       Patient is completely out of medication. Wants something for now until [facility] send his refills.     [id number] - [place], [place] - [address] [place] [address] [phone], [phone]

## 2024-11-01 ENCOUNTER — TELEPHONE (OUTPATIENT)
Dept: SURGERY | Facility: CLINIC | Age: 64
End: 2024-11-01

## 2024-11-01 NOTE — TELEPHONE ENCOUNTER
Patient states he was told he would receive pre-op instructions for 12/3 surgery in the mail but patient states he has not received anything yet. Patient states it was mailed last week and is asking to speak with LEISA Szymanski. Please advise.

## 2024-11-05 ENCOUNTER — OFFICE VISIT (OUTPATIENT)
Dept: FAMILY MEDICINE CLINIC | Facility: CLINIC | Age: 64
End: 2024-11-05

## 2024-11-05 VITALS
TEMPERATURE: 97 F | SYSTOLIC BLOOD PRESSURE: 140 MMHG | DIASTOLIC BLOOD PRESSURE: 90 MMHG | BODY MASS INDEX: 28.93 KG/M2 | WEIGHT: 213.63 LBS | HEIGHT: 72 IN | HEART RATE: 79 BPM

## 2024-11-05 DIAGNOSIS — E11.9 CONTROLLED TYPE 2 DIABETES MELLITUS WITHOUT COMPLICATION, WITHOUT LONG-TERM CURRENT USE OF INSULIN (HCC): ICD-10-CM

## 2024-11-05 DIAGNOSIS — Z01.818 PRE-OPERATIVE GENERAL PHYSICAL EXAMINATION: Primary | ICD-10-CM

## 2024-11-05 DIAGNOSIS — I10 ESSENTIAL HYPERTENSION: ICD-10-CM

## 2024-11-05 DIAGNOSIS — Z87.438 HISTORY OF BPH: ICD-10-CM

## 2024-11-05 DIAGNOSIS — N42.89 PROSTATE MASS: ICD-10-CM

## 2024-11-05 PROCEDURE — 99214 OFFICE O/P EST MOD 30 MIN: CPT | Performed by: NURSE PRACTITIONER

## 2024-11-05 NOTE — ASSESSMENT & PLAN NOTE
Continue present management  Check blood pressure twice per day with arm cuff bp monitor for 2 weeks  Record date, time, blood pressure and pulse reading  Send in copy of bp log to me via [employer] or you may call into nurse triage staff  Call if bp consistently > 140/86  Go to ER if any severe headache, chest pain, shortness of breath, visual changes  Please let me know if you have any questions or concerns.

## 2024-11-05 NOTE — TELEPHONE ENCOUNTER
Please review.  Any changes?  Visit today Tuesday 11/5/24    Today's Visits  Date Type Provider Dept   11/05/24 Office Visit Lexi Mccarty APRN [facility]     Patient has labs pended from 10/22/2024    Requested Prescriptions   Pending Prescriptions Disp Refills    finasteride 5 MG Oral Tab [Pharmacy Med Name: Finasteride 5 Mg Tab Auro] 90 tablet 3     Sig: Take 1 tablet (5 mg total) by mouth daily.       Genitourinary Medications Passed - 11/5/2024  8:59 AM        Passed - Patient does not have pulmonary hypertension on problem list        Passed - In person appointment or virtual visit in the past 12 mos or appointment in next 3 mos     Recent Outpatient Visits              Today Pre-operative general physical examination    [facility] Lexi Mccarty APRN    Office Visit    3 months ago Prostate cancer (HCC)    [facility] Jose Angel Turpin MD    Office Visit    4 months ago [facility]state cancer (HCC)    [facility]    Office Visit    6 months ago Elevated PSA    [facility] Piper Haywood MD    Office Visit    7 months ago Elevated PSA    [facility]    [facility] Visit          Future Appointments         Provider Department Appt Notes    In 4 days ADO SCHEDULED RESOURCE [facility]     In 4 days ADO SCHEDULED RESOURCE [facility]     In 4 days ADO XR RM[facility] X[facility] - [place]     In 1 month Eulalia Allison APRN [facility] post op epps removal ZH                      lisinopril 40 MG Oral Tab [Pharmacy Med Name: Lisinopril 40 Mg Tab Lupi] 90 tablet 3     Sig: Take 1 tablet (40 mg total) by mouth daily.       Hypertension  Medications Protocol Failed - 11/5/2024  8:59 AM        Failed - Last BP reading less than 140/90     BP Readings from Last 1 Encounters:   11/05/24 140/90               Passed - CMP or BMP in past 12 months        Passed - In person appointment or virtual visit in the past 12 mos or appointment in next 3 mos     Recent Outpatient Visits              Today Pre-operative general physical examination    [facility] Lexi Mccarty APRN    Office Visit    3 months ago Prostate cancer (HCC)    [facility], Jose Angel Aquino MD    Office Visit    4 months ago Prostate cancer (HCC)    [facility]    Office Visit    6 months ago Elevated PSA    [facility]    Office Visit    7 months ago Elevated PSA    [facility], Piper Haywood MD    Office Visit          Future Appointments         Provider Department Appt Notes    In 4 days ADO SCHEDULED RESOURCE [facility]     In 4 days ADO SCHEDULED RESOURCE [facility]     In 4 days ADO XR [address] X-ray - [place]     In 1 month Eulalia Allison APRN [facility] post op epps removal ZH                    Passed - EGFRCR or GFRNAA > 50     GFR Evaluation  EGFRCR: 72 , resulted on 2/3/2024            rosuvastatin 40 MG Oral Tab [Pharmacy Med Name: Rosuvastatin Calcium 40 Mg Tab [place]] 90 tablet 3     Sig: Take 1 tablet (40 mg total) by mouth nightly.       Cholesterol Medication Protocol Passed - 11/5/2024  8:59 AM        Passed - ALT < 80     Lab Results   Component Value Date    ALT 25 02/03/2024             Passed - ALT resulted within past year        Passed - Lipid panel within past 12 months     Lab  Results   Component Value Date    CHOLEST 97 02/03/2024    TRIG 47 02/03/2024    HDL 62 (H) 02/03/2024    LDL 23 02/03/2024    VLDL 6 02/03/2024    TCHDLRATIO 2.9 06/29/2017    NONHDLC 35 02/03/2024             Passed - In person appointment or virtual visit in the past 12 mos or appointment in next 3 mos     Recent Outpatient Visits              Today Pre-operative general physical examination    [facility] Lexi Mccarty APRN    Office Visit    3 months ago Prostate cancer (HCC)    [facility] [place] [facility]    Office Visit    4 months ago Prostate cancer (HCC)    [facility], Piper Haywood MD    Office Visit    6 months ago Elevated PSA    [facility], Piper Haywood MD    Office Visit    7 months ago Elevated PSA    [facility]    Office Visit          Future Appointments         Provider Department Appt Notes    In 4 days ADO SCHEDULED RESOURCE [facility]     In 4 days ADO SCHEDULED RESOURCE [facility]     In 4 days ADO XR [address] X-ray - [place]     In 1 month Eulalia Allison APRN [facility] post op epps removal ZH                      amLODIPine 5 MG Oral Tab [Pharmacy Med Name: Amlodipine Besylate 5 Mg Tab Unic] 90 tablet 3     Sig: Take 1 tablet (5 mg total) by mouth daily.       Hypertension Medications Protocol Failed - 11/5/2024  8:59 AM        Failed - Last BP reading less than 140/90     BP Readings from Last 1 Encounters:   11/05/24 140/90               Passed - CMP or BMP in past 12 months        Passed - In person appointment or virtual visit in the past 12 mos or appointment in next 3 mos     Recent Outpatient Visits               Today Pre-operative general physical examination    [facility] Lexi Mccarty APRN    Office Visit    3 months ago [facility]state cancer (HCC)    [facility]    Office Visit    4 months ago Prostate cancer (HCC)    [facility]    Office Visit    6 months ago Elevated PSA    [facility]    Office Visit    7 months ago Elevated PSA    [facility]    Office Visit          Future Appointments         Provider Department Appt Notes    In 4 days ADO SCHEDULED RESOURCE [facility]     In 4 days ADO SCHEDULED RESOURCE [facility]     In 4 days ADO XR [address] X-ray - [place]     In 1 month Eulalia Allison APRN [facility] post op epps removal ZH                    Passed - EGFRCR or GFRNAA > 50     GFR Evaluation  EGFRCR: 72 , resulted on 2/3/2024             Future Appointments         Provider Department Appt Notes    In 4 days ADO SCHEDULED RESOURCE [facility]     In 4 days ADO SCHEDULED RESOURCE [facility]     In 4 days ADO XR [address] X-ray - [place]     In 1 month Eulalia Allison APRN [facility] post op epps removal ZH          Recent Outpatient Visits              Today Pre-operative general physical examination    [facility] Lexi Mccarty APRN    Office Visit    3 months ago Prostate cancer ([facility])    [facility]    Office Visit    4 months ago Prostate cancer  (HCC)    [facility], Piper Haywood MD    Office Visit    6 months ago Elevated PSA    [facility], Piper Haywood MD    Office Visit    7 months ago Elevated PSA    [facility], Piper Haywood MD    Office Visit

## 2024-11-05 NOTE — PROGRESS NOTES
HPI  Pt presents for pre-op on Dec 3 with Dr Turpin for XI ROBOT-ASSISTED  LAPAROSCOPIC RADICAL PROSTATECTOMY, BILATERL PELVIC LYMPH NODE DISSECTION.     Lab orders were placed by Dr Turpin.   Denies hematuria, nocturia, difficulty starting stream.     Blood sugars run 120s     Had flu shot last month.     H/o appendectomy-no issues w anesthesia    BP Readings from Last 5 Encounters:   11/05/24 140/90   02/09/24 128/71   01/26/24 142/71   07/21/23 127/83   07/15/23 131/83      Review of Systems   Constitutional:  Negative for activity change, appetite change and fever.   HENT:  Negative for congestion, ear pain, rhinorrhea, sore throat and trouble swallowing.    Eyes:  Negative for pain, redness and visual disturbance.   Respiratory:  Negative for cough, chest tightness, shortness of breath and wheezing.    Cardiovascular:  Negative for chest pain and palpitations.   Gastrointestinal:  Negative for abdominal distention, abdominal pain, constipation, diarrhea, nausea and vomiting.   Endocrine: Negative for polydipsia and polyuria.   Genitourinary:  Negative for decreased urine volume, difficulty urinating, dysuria, frequency and hematuria.        Denies nocturia     Musculoskeletal:  Negative for back pain, gait problem and myalgias.   Skin:  Negative for color change and rash.   Neurological:  Negative for dizziness, weakness and headaches.   Psychiatric/Behavioral:  Negative for dysphoric mood and sleep disturbance. The patient is not nervous/anxious.        Vitals:    11/05/24 0803 11/05/24 0830   BP: 149/77 140/90   Pulse: 79    Temp: 97 °F (36.1 °C)    Weight: 213 lb 9.6 oz (96.9 kg)    Height: 6' (1.829 m)      Body mass index is 28.97 kg/m².  Wt Readings from Last 6 Encounters:   11/05/24 213 lb 9.6 oz (96.9 kg)   02/09/24 206 lb (93.4 kg)   07/21/23 195 lb (88.5 kg)   07/15/23 190 lb (86.2 kg)   06/06/23 190 lb (86.2 kg)   12/08/22 201 lb (91.2 kg)         Health Maintenance   Topic Date Due    Fall Risk  Screening (Annual)  01/01/2024    Pneumococcal Vaccine: 65+ Years (3 of 3 - PPSV23 or PCV20) 03/30/2024    Diabetes Care Foot Exam  07/15/2024    Diabetes Care Dilated Eye Exam  07/31/2024    Diabetes Care A1C  08/03/2024    COVID-19 Vaccine (4 - 2023-24 season) 09/01/2024    Influenza Vaccine (1) 10/01/2024    Colorectal Cancer Screening  12/30/2024    Diabetes Care: GFR  02/03/2025    Diabetes Care: Microalb/Creat Ratio  02/03/2025    Annual Physical  02/09/2025    PSA  02/03/2026    Annual Depression Screening  Completed    Zoster Vaccines  Completed       Past Medical History:    Calculus of kidney    High blood pressure    High cholesterol    Kidney stones    Other and unspecified hyperlipidemia    Prostatitis    Type II or unspecified type diabetes mellitus without mention of complication, not stated as uncontrolled    Unspecified essential hypertension       .  Past Surgical History:   Procedure Laterality Date    Appendectomy      Colonoscopy      Colonoscopy N/A 12/30/2019    Procedure: COLONOSCOPY;  Surgeon: Simon Alvarez MD;  Location: [facility] ENDOSCOPY    Hernia surgery      Thoracotomy,excis bullae      as infant for empyema       Family History   Problem Relation Age of Onset    Heart Disease Father 69        had [address] and [address] way bypass.    Cancer Father         Prostate    Heart Disease Paternal Aunt     Heart Disease Mother     Cancer Other         Prostate    Cancer Paternal Grandfather         prostate cancer       Social History     Socioeconomic History    Marital status: Single     Spouse name: Not on file    Number of children: Not on file    Years of education: Not on file    Highest education level: Not on file   Occupational History    Not on file   Tobacco Use    Smoking status: Never     Passive exposure: Never    Smokeless tobacco: Never   Vaping Use    Vaping status: Never Used   Substance and Sexual Activity    Alcohol use: No    Drug use: No    Sexual activity: Not on file   Other  Topics Concern    Not on file   Social History Narrative    Not on file     Social Drivers of Health     Financial Resource Strain: Not on file   Food Insecurity: Not on file   Transportation Needs: Not on file   Physical Activity: Not on file   Stress: Not on file   Social Connections: Not on file   Housing Stability: Not on file       Current Outpatient Medications   Medication Sig Dispense Refill    amLODIPine 5 MG Oral Tab Take 1 tablet (5 mg total) by mouth daily. 10 tablet 0    finasteride 5 MG Oral Tab Take 1 tablet (5 mg total) by mouth daily. 10 tablet 0    lisinopril 40 MG Oral Tab Take 1 tablet (40 mg total) by mouth daily. 10 tablet 0    rosuvastatin 40 MG Oral Tab Take 1 tablet (40 mg total) by mouth at bedtime. 10 tablet 0    metFORMIN HCl 1000 MG Oral Tab Take 1 tablet (1,000 mg total) by mouth 2 (two) times daily with meals. 180 tablet 0    FreeStyle Lancets Does not apply Misc 1 Lancet by Finger stick route daily. 100 each 0    Glucose Blood (FREESTYLE LITE TEST) In Vitro Strip 1 strip by Other route daily. Check blood sugar daily 100 strip 3    Polyethylene Glycol 3350 (MIRALAX) 17 g Oral Powd Pack Take 17 g by mouth daily. 30 packet 0    docusate sodium 100 MG Oral Cap Take 1 capsule (100 mg total) by mouth 2 (two) times daily. 30 capsule 11    Ascorbic Acid (VITAMIN C OR) Take by mouth.      Multiple Vitamins-Minerals (MULTIVITAMIN MEN OR) Take by mouth.      aspirin 81 MG Oral Tab Take 1 tablet by mouth daily. 100 tablet 3    Coenzyme Q10 (CO Q 10 OR) Take 1 tablet by mouth daily. (Patient not taking: Reported on 11/5/2024)         Allergies:  Allergies[1]    Physical Exam  Vitals and nursing note reviewed.   Constitutional:       General: He is not in acute distress.     Appearance: Normal appearance.   HENT:      Head: Normocephalic.      Right Ear: Tympanic membrane, ear canal and external ear normal.      Left Ear: Tympanic membrane, ear canal and external ear normal.      Nose: Nose normal.  No congestion or rhinorrhea.      Mouth/Throat:      Mouth: Mucous membranes are moist.      Pharynx: Oropharynx is clear. No oropharyngeal exudate or posterior oropharyngeal erythema.   Eyes:      Conjunctiva/sclera: Conjunctivae normal.   Cardiovascular:      Rate and Rhythm: Normal rate and regular rhythm.      Heart sounds: Normal heart sounds.   Pulmonary:      Effort: Pulmonary effort is normal. No respiratory distress.      Breath sounds: Normal breath sounds.   Abdominal:      General: Bowel sounds are normal. There is no distension.      Palpations: Abdomen is soft.      Tenderness: There is no abdominal tenderness.   Musculoskeletal:         General: Normal range of motion.      Cervical back: Normal range of motion and neck supple.      Comments: Slight kyphosis   Lymphadenopathy:      Cervical: No cervical adenopathy.   Skin:     General: Skin is warm and dry.   Neurological:      Mental Status: He is alert and oriented to person, place, and time.         Assessment and Plan:   Problem List Items Addressed This Visit       Controlled type 2 diabetes mellitus without complication, without long-term current use of insulin (HCC)     Continue present management  Check hga1c  Encourage low carb diet          Relevant Orders    Hemoglobin A1C    Essential hypertension     Continue present management  Check blood pressure twice per day with arm cuff bp monitor for 2 weeks  Record date, time, blood pressure and pulse reading  Send in copy of bp log to me via [employer] or you may call into nurse triage staff  Call if bp consistently > 140/86  Go to ER if any severe headache, chest pain, shortness of breath, visual changes  Please let me know if you have any questions or concerns.              History of BPH     cpm         Pre-operative general physical examination - Primary     Screening labs, EKG and chest xray ordered  Has tolerated anesthesia in the past         Relevant Orders    EKG 12 Lead    XR CHEST PA +  LAT CHEST (CPT=71046)    Prostate mass     XI ROBOT-ASSISTED  LAPAROSCOPIC RADICAL PROSTATECTOMY, BILATERL PELVIC LYMPH NODE DISSECTION scheduled for 12/3                    Discussed plan of care with pt and pt is in agreement.All questions answered. Pt to call with questions or concerns.      Encouraged to sign up for My Chart if not already registered.     Note to patient and family:  The 21st Century Cures Act makes medical notes available to patients in the interest of transparency.  However, please be advised that this is a medical document.  It is intended as a peer to peer communication.  It is written in medical language and may contain abbreviations or verbiage that are technical and unfamiliar.  It may appear blunt or direct.  Medical documents are intended to carry relevant information, facts as evident, and the clinical opinion of the practitioner.       [1]   Allergies  Allergen Reactions    Ciprofloxacin OTHER (SEE COMMENTS)     Don't remember

## 2024-11-05 NOTE — ASSESSMENT & PLAN NOTE
XI ROBOT-ASSISTED  LAPAROSCOPIC RADICAL PROSTATECTOMY, BILATERL PELVIC LYMPH NODE DISSECTION scheduled for 12/3

## 2024-11-08 RX ORDER — ROSUVASTATIN CALCIUM 40 MG/1
40 TABLET, COATED ORAL NIGHTLY
Qty: 90 TABLET | Refills: 3 | Status: SHIPPED | OUTPATIENT
Start: 2024-11-08

## 2024-11-08 RX ORDER — LISINOPRIL 40 MG/1
40 TABLET ORAL DAILY
Qty: 90 TABLET | Refills: 3 | Status: SHIPPED | OUTPATIENT
Start: 2024-11-08

## 2024-11-08 RX ORDER — AMLODIPINE BESYLATE 5 MG/1
5 TABLET ORAL DAILY
Qty: 90 TABLET | Refills: 3 | Status: SHIPPED | OUTPATIENT
Start: 2024-11-08

## 2024-11-08 RX ORDER — FINASTERIDE 5 MG/1
5 TABLET, FILM COATED ORAL DAILY
Qty: 90 TABLET | Refills: 3 | Status: SHIPPED | OUTPATIENT
Start: 2024-11-08

## 2024-11-09 ENCOUNTER — HOSPITAL ENCOUNTER (OUTPATIENT)
Dept: GENERAL RADIOLOGY | Age: 64
Discharge: HOME | End: 2024-11-09
Attending: NURSE PRACTITIONER
Payer: COMMERCIAL

## 2024-11-09 ENCOUNTER — LAB ENCOUNTER (OUTPATIENT)
Dept: LAB | Age: 64
End: 2024-11-09
Attending: NURSE PRACTITIONER
Payer: COMMERCIAL

## 2024-11-09 ENCOUNTER — EKG ENCOUNTER (OUTPATIENT)
Dept: LAB | Age: 64
End: 2024-11-09
Attending: NURSE PRACTITIONER
Payer: COMMERCIAL

## 2024-11-09 DIAGNOSIS — Z01.818 PRE-OPERATIVE GENERAL PHYSICAL EXAMINATION: ICD-10-CM

## 2024-11-09 DIAGNOSIS — E11.9 CONTROLLED TYPE 2 DIABETES MELLITUS WITHOUT COMPLICATION, WITHOUT LONG-TERM CURRENT USE OF INSULIN (HCC): ICD-10-CM

## 2024-11-09 LAB
ANION GAP SERPL CALC-SCNC: 8 MMOL/L (ref 0–18)
ANTIBODY SCREEN: NEGATIVE
APTT PPP: 29.9 SECONDS (ref 23–36)
BASOPHILS # BLD AUTO: 0.08 X10(3) UL (ref 0–0.2)
BASOPHILS NFR BLD AUTO: 1.4 %
BILIRUB UR QL: NEGATIVE
BUN BLD-MCNC: 21 MG/DL (ref 9–23)
BUN/CREAT SERPL: 17.4 (ref 10–20)
CALCIUM BLD-MCNC: 10.4 MG/DL (ref 8.7–10.4)
CHLORIDE SERPL-SCNC: 112 MMOL/L (ref 98–112)
CLARITY UR: CLEAR
CO2 SERPL-SCNC: 25 MMOL/L (ref 21–32)
COLOR UR: YELLOW
CREAT BLD-MCNC: 1.21 MG/DL
DEPRECATED RDW RBC AUTO: 42.1 FL (ref 35.1–46.3)
EGFRCR SERPLBLD CKD-EPI 2021: 66 ML/MIN/1.73M2 (ref 60–?)
EOSINOPHIL # BLD AUTO: 0.27 X10(3) UL (ref 0–0.7)
EOSINOPHIL NFR BLD AUTO: 4.7 %
ERYTHROCYTE [DISTWIDTH] IN BLOOD BY AUTOMATED COUNT: 13.2 % (ref 11–15)
EST. AVERAGE GLUCOSE BLD GHB EST-MCNC: 148 MG/DL (ref 68–126)
FASTING STATUS PATIENT QL REPORTED: YES
GLUCOSE BLD-MCNC: 122 MG/DL (ref 70–99)
GLUCOSE UR-MCNC: NORMAL MG/DL
HBA1C MFR BLD: 6.8 % (ref ?–5.7)
HCT VFR BLD AUTO: 39.4 %
HGB BLD-MCNC: 13.6 G/DL
HGB UR QL STRIP.AUTO: NEGATIVE
IMM GRANULOCYTES # BLD AUTO: 0.02 X10(3) UL (ref 0–1)
IMM GRANULOCYTES NFR BLD: 0.4 %
INR BLD: 0.9 (ref 0.8–1.2)
KETONES UR-MCNC: NEGATIVE MG/DL
LEUKOCYTE ESTERASE UR QL STRIP.AUTO: NEGATIVE
LYMPHOCYTES # BLD AUTO: 1.86 X10(3) UL (ref 1–4)
LYMPHOCYTES NFR BLD AUTO: 32.6 %
MCH RBC QN AUTO: 30.2 PG (ref 26–34)
MCHC RBC AUTO-ENTMCNC: 34.5 G/DL (ref 31–37)
MCV RBC AUTO: 87.4 FL
MONOCYTES # BLD AUTO: 0.62 X10(3) UL (ref 0.1–1)
MONOCYTES NFR BLD AUTO: 10.9 %
NEUTROPHILS # BLD AUTO: 2.85 X10 (3) UL (ref 1.5–7.7)
NEUTROPHILS # BLD AUTO: 2.85 X10(3) UL (ref 1.5–7.7)
NEUTROPHILS NFR BLD AUTO: 50 %
NITRITE UR QL STRIP.AUTO: NEGATIVE
OSMOLALITY SERPL CALC.SUM OF ELEC: 304 MOSM/KG (ref 275–295)
PH UR: 5.5 [PH] (ref 5–8)
PLATELET # BLD AUTO: 234 10(3)UL (ref 150–450)
POTASSIUM SERPL-SCNC: 4.6 MMOL/L (ref 3.5–5.1)
PROT UR-MCNC: NEGATIVE MG/DL
PROTHROMBIN TIME: 12.7 SECONDS (ref 11.6–14.8)
RBC # BLD AUTO: 4.51 X10(6)UL
RH BLOOD TYPE: POSITIVE
SODIUM SERPL-SCNC: 145 MMOL/L (ref 136–145)
SP GR UR STRIP: 1.02 (ref 1–1.03)
UROBILINOGEN UR STRIP-ACNC: NORMAL
WBC # BLD AUTO: 5.7 X10(3) UL (ref 4–11)

## 2024-11-09 PROCEDURE — 85730 THROMBOPLASTIN TIME PARTIAL: CPT | Performed by: UROLOGY

## 2024-11-09 PROCEDURE — 85025 COMPLETE CBC W/AUTO DIFF WBC: CPT | Performed by: UROLOGY

## 2024-11-09 PROCEDURE — 83036 HEMOGLOBIN GLYCOSYLATED A1C: CPT

## 2024-11-09 PROCEDURE — 36415 COLL VENOUS BLD VENIPUNCTURE: CPT | Performed by: UROLOGY

## 2024-11-09 PROCEDURE — 80048 BASIC METABOLIC PNL TOTAL CA: CPT | Performed by: UROLOGY

## 2024-11-09 PROCEDURE — 85610 PROTHROMBIN TIME: CPT | Performed by: UROLOGY

## 2024-11-09 PROCEDURE — 86850 RBC ANTIBODY SCREEN: CPT | Performed by: PHYSICIAN ASSISTANT

## 2024-11-09 PROCEDURE — 93010 ELECTROCARDIOGRAM REPORT: CPT | Performed by: STUDENT IN AN ORGANIZED HEALTH CARE EDUCATION/TRAINING PROGRAM

## 2024-11-09 PROCEDURE — 81003 URINALYSIS AUTO W/O SCOPE: CPT | Performed by: UROLOGY

## 2024-11-09 PROCEDURE — 87086 URINE CULTURE/COLONY COUNT: CPT | Performed by: UROLOGY

## 2024-11-09 PROCEDURE — 71046 X-RAY EXAM CHEST 2 VIEWS: CPT | Performed by: NURSE PRACTITIONER

## 2024-11-09 PROCEDURE — 86901 BLOOD TYPING SEROLOGIC RH(D): CPT | Performed by: PHYSICIAN ASSISTANT

## 2024-11-09 PROCEDURE — 93005 ELECTROCARDIOGRAM TRACING: CPT

## 2024-11-09 PROCEDURE — 86900 BLOOD TYPING SEROLOGIC ABO: CPT | Performed by: PHYSICIAN ASSISTANT

## 2024-11-11 LAB
ATRIAL RATE: 56 BPM
P AXIS: 61 DEGREES
P-R INTERVAL: 162 MS
Q-T INTERVAL: 422 MS
QRS DURATION: 90 MS
QTC CALCULATION (BEZET): 407 MS
R AXIS: -1 DEGREES
T AXIS: 32 DEGREES
VENTRICULAR RATE: 56 BPM

## 2024-11-15 DIAGNOSIS — E11.9 CONTROLLED TYPE 2 DIABETES MELLITUS WITHOUT COMPLICATION, WITHOUT LONG-TERM CURRENT USE OF INSULIN (HCC): Primary | ICD-10-CM

## 2024-11-23 ENCOUNTER — LAB ENCOUNTER (OUTPATIENT)
Dept: LAB | Age: 64
End: 2024-11-23
Attending: FAMILY MEDICINE
Payer: COMMERCIAL

## 2024-11-23 DIAGNOSIS — R39.89 NOCTURNAL EMISSION: Primary | ICD-10-CM

## 2024-11-23 DIAGNOSIS — Z01.818 PREOP EXAMINATION: ICD-10-CM

## 2024-11-23 DIAGNOSIS — Z79.01 LONG TERM (CURRENT) USE OF ANTICOAGULANTS: ICD-10-CM

## 2024-11-23 LAB
ANION GAP SERPL CALC-SCNC: 9 MMOL/L (ref 0–18)
ANTIBODY SCREEN: NEGATIVE
APTT PPP: 27.5 SECONDS (ref 23–36)
BASOPHILS # BLD AUTO: 0.1 X10(3) UL (ref 0–0.2)
BASOPHILS NFR BLD AUTO: 1.7 %
BILIRUB UR QL: NEGATIVE
BUN BLD-MCNC: 18 MG/DL (ref 9–23)
BUN/CREAT SERPL: 15.5 (ref 10–20)
CALCIUM BLD-MCNC: 10.2 MG/DL (ref 8.7–10.4)
CHLORIDE SERPL-SCNC: 109 MMOL/L (ref 98–112)
CLARITY UR: CLEAR
CO2 SERPL-SCNC: 25 MMOL/L (ref 21–32)
COLOR UR: YELLOW
CREAT BLD-MCNC: 1.16 MG/DL
DEPRECATED RDW RBC AUTO: 40 FL (ref 35.1–46.3)
EGFRCR SERPLBLD CKD-EPI 2021: 70 ML/MIN/1.73M2 (ref 60–?)
EOSINOPHIL # BLD AUTO: 0.3 X10(3) UL (ref 0–0.7)
EOSINOPHIL NFR BLD AUTO: 5.1 %
ERYTHROCYTE [DISTWIDTH] IN BLOOD BY AUTOMATED COUNT: 12.9 % (ref 11–15)
FASTING STATUS PATIENT QL REPORTED: YES
GLUCOSE BLD-MCNC: 142 MG/DL (ref 70–99)
GLUCOSE UR-MCNC: NORMAL MG/DL
HCT VFR BLD AUTO: 39.7 %
HGB BLD-MCNC: 14.1 G/DL
HGB UR QL STRIP.AUTO: NEGATIVE
IMM GRANULOCYTES # BLD AUTO: 0.01 X10(3) UL (ref 0–1)
IMM GRANULOCYTES NFR BLD: 0.2 %
INR BLD: 0.86 (ref 0.8–1.2)
KETONES UR-MCNC: NEGATIVE MG/DL
LEUKOCYTE ESTERASE UR QL STRIP.AUTO: NEGATIVE
LYMPHOCYTES # BLD AUTO: 1.66 X10(3) UL (ref 1–4)
LYMPHOCYTES NFR BLD AUTO: 28.1 %
MCH RBC QN AUTO: 30.5 PG (ref 26–34)
MCHC RBC AUTO-ENTMCNC: 35.5 G/DL (ref 31–37)
MCV RBC AUTO: 85.7 FL
MONOCYTES # BLD AUTO: 0.57 X10(3) UL (ref 0.1–1)
MONOCYTES NFR BLD AUTO: 9.6 %
NEUTROPHILS # BLD AUTO: 3.27 X10 (3) UL (ref 1.5–7.7)
NEUTROPHILS # BLD AUTO: 3.27 X10(3) UL (ref 1.5–7.7)
NEUTROPHILS NFR BLD AUTO: 55.3 %
NITRITE UR QL STRIP.AUTO: NEGATIVE
OSMOLALITY SERPL CALC.SUM OF ELEC: 300 MOSM/KG (ref 275–295)
PH UR: 5 [PH] (ref 5–8)
PLATELET # BLD AUTO: 254 10(3)UL (ref 150–450)
POTASSIUM SERPL-SCNC: 4.5 MMOL/L (ref 3.5–5.1)
PROT UR-MCNC: NEGATIVE MG/DL
PROTHROMBIN TIME: 12.3 SECONDS (ref 11.6–14.8)
RBC # BLD AUTO: 4.63 X10(6)UL
RH BLOOD TYPE: POSITIVE
SODIUM SERPL-SCNC: 143 MMOL/L (ref 136–145)
SP GR UR STRIP: 1.02 (ref 1–1.03)
UROBILINOGEN UR STRIP-ACNC: NORMAL
WBC # BLD AUTO: 5.9 X10(3) UL (ref 4–11)

## 2024-11-23 PROCEDURE — 85025 COMPLETE CBC W/AUTO DIFF WBC: CPT

## 2024-11-23 PROCEDURE — 87086 URINE CULTURE/COLONY COUNT: CPT

## 2024-11-23 PROCEDURE — 86850 RBC ANTIBODY SCREEN: CPT

## 2024-11-23 PROCEDURE — 80048 BASIC METABOLIC PNL TOTAL CA: CPT

## 2024-11-23 PROCEDURE — 86901 BLOOD TYPING SEROLOGIC RH(D): CPT

## 2024-11-23 PROCEDURE — 85730 THROMBOPLASTIN TIME PARTIAL: CPT

## 2024-11-23 PROCEDURE — 86900 BLOOD TYPING SEROLOGIC ABO: CPT

## 2024-11-23 PROCEDURE — 36415 COLL VENOUS BLD VENIPUNCTURE: CPT

## 2024-11-23 PROCEDURE — 81003 URINALYSIS AUTO W/O SCOPE: CPT

## 2024-11-23 PROCEDURE — 85610 PROTHROMBIN TIME: CPT

## 2024-11-26 RX ORDER — ACETAMINOPHEN 500 MG
1000 TABLET ORAL ONCE
Status: CANCELLED | OUTPATIENT
Start: 2024-11-26 | End: 2024-11-26

## 2024-11-26 RX ORDER — SODIUM CHLORIDE, SODIUM LACTATE, POTASSIUM CHLORIDE, CALCIUM CHLORIDE 600; 310; 30; 20 MG/100ML; MG/100ML; MG/100ML; MG/100ML
INJECTION, SOLUTION INTRAVENOUS CONTINUOUS
Status: CANCELLED | OUTPATIENT
Start: 2024-11-26

## 2024-11-29 ENCOUNTER — TELEPHONE (OUTPATIENT)
Dept: SURGERY | Facility: CLINIC | Age: 64
End: 2024-11-29

## 2024-11-29 NOTE — TELEPHONE ENCOUNTER
Patient sister (Chela) called to inform us patient will be submitting short term disability, forms dept email was provided. Obtained detail. Patient sister will call back to confirm if forms were received.   Type of Leave: short term disability   Reason for Leave: sx   Start date of leave: 12/3/24  End date of leave: 4 weeks   How many flare ups per month/length?:  How many appts per month/length?:   Was Fee and Turnaround info Given?:

## 2024-11-29 NOTE — TELEPHONE ENCOUNTER
Disability forms received via email. Logged for processing. No active MyChart to send Release of Information. Patient sister requested to send blank Release of Information to her email at [email or web address]

## 2024-12-02 ENCOUNTER — ANESTHESIA EVENT (OUTPATIENT)
Dept: SURGERY | Facility: HOSPITAL | Age: 64
End: 2024-12-02
Payer: COMMERCIAL

## 2024-12-02 NOTE — TELEPHONE ENCOUNTER
Release of Information question. Explained to patient sister. Will be sending Release of Information via e-mail.

## 2024-12-03 ENCOUNTER — HOSPITAL ENCOUNTER (OUTPATIENT)
Facility: HOSPITAL | Age: 64
Discharge: HOME | End: 2024-12-04
Attending: UROLOGY | Admitting: UROLOGY
Payer: COMMERCIAL

## 2024-12-03 ENCOUNTER — TELEPHONE (OUTPATIENT)
Dept: SURGERY | Facility: CLINIC | Age: 64
End: 2024-12-03

## 2024-12-03 ENCOUNTER — ANESTHESIA (OUTPATIENT)
Dept: SURGERY | Facility: HOSPITAL | Age: 64
End: 2024-12-03
Payer: COMMERCIAL

## 2024-12-03 DIAGNOSIS — K42.9 RECURRENT UMBILICAL HERNIA: ICD-10-CM

## 2024-12-03 DIAGNOSIS — C61 PROSTATE CANCER (HCC): Primary | ICD-10-CM

## 2024-12-03 LAB
GLUCOSE BLDC GLUCOMTR-MCNC: 132 MG/DL (ref 70–99)
GLUCOSE BLDC GLUCOMTR-MCNC: 167 MG/DL (ref 70–99)
GLUCOSE BLDC GLUCOMTR-MCNC: 182 MG/DL (ref 70–99)
GLUCOSE BLDC GLUCOMTR-MCNC: 189 MG/DL (ref 70–99)

## 2024-12-03 PROCEDURE — 07BC4ZX EXCISION OF PELVIS LYMPHATIC, PERCUTANEOUS ENDOSCOPIC APPROACH, DIAGNOSTIC: ICD-10-PCS | Performed by: UROLOGY

## 2024-12-03 PROCEDURE — 38571 LAPAROSCOPY LYMPHADENECTOMY: CPT | Performed by: UROLOGY

## 2024-12-03 PROCEDURE — 99203 OFFICE O/P NEW LOW 30 MIN: CPT | Performed by: HOSPITALIST

## 2024-12-03 PROCEDURE — 0WQF4ZZ REPAIR ABDOMINAL WALL, PERCUTANEOUS ENDOSCOPIC APPROACH: ICD-10-PCS | Performed by: UROLOGY

## 2024-12-03 PROCEDURE — 55866 LAPS SURG PRST8ECT RPBIC RAD: CPT | Performed by: UROLOGY

## 2024-12-03 PROCEDURE — 0VT04ZZ RESECTION OF PROSTATE, PERCUTANEOUS ENDOSCOPIC APPROACH: ICD-10-PCS | Performed by: UROLOGY

## 2024-12-03 PROCEDURE — 8E0W4CZ ROBOTIC ASSISTED PROCEDURE OF TRUNK REGION, PERCUTANEOUS ENDOSCOPIC APPROACH: ICD-10-PCS | Performed by: UROLOGY

## 2024-12-03 PROCEDURE — 49613 RPR AA HRN RCR < 3 RDC: CPT | Performed by: UROLOGY

## 2024-12-03 RX ORDER — NICOTINE POLACRILEX 4 MG
30 LOZENGE BUCCAL
Status: DISCONTINUED | OUTPATIENT
Start: 2024-12-03 | End: 2024-12-03 | Stop reason: HOSPADM

## 2024-12-03 RX ORDER — ONDANSETRON 2 MG/ML
4 INJECTION INTRAMUSCULAR; INTRAVENOUS EVERY 6 HOURS PRN
Status: DISCONTINUED | OUTPATIENT
Start: 2024-12-03 | End: 2024-12-03 | Stop reason: HOSPADM

## 2024-12-03 RX ORDER — SENNOSIDES 8.6 MG
17.2 TABLET ORAL NIGHTLY
Status: DISCONTINUED | OUTPATIENT
Start: 2024-12-03 | End: 2024-12-04

## 2024-12-03 RX ORDER — OXYCODONE HYDROCHLORIDE 5 MG/1
5 TABLET ORAL EVERY 6 HOURS PRN
Status: DISCONTINUED | OUTPATIENT
Start: 2024-12-03 | End: 2024-12-04

## 2024-12-03 RX ORDER — ACETAMINOPHEN 10 MG/ML
1000 INJECTION, SOLUTION INTRAVENOUS EVERY 8 HOURS
Status: DISCONTINUED | OUTPATIENT
Start: 2024-12-03 | End: 2024-12-04

## 2024-12-03 RX ORDER — METOCLOPRAMIDE HYDROCHLORIDE 5 MG/ML
10 INJECTION INTRAMUSCULAR; INTRAVENOUS EVERY 8 HOURS PRN
Status: DISCONTINUED | OUTPATIENT
Start: 2024-12-03 | End: 2024-12-04

## 2024-12-03 RX ORDER — SODIUM CHLORIDE 9 MG/ML
INJECTION, SOLUTION INTRAVENOUS CONTINUOUS
Status: DISCONTINUED | OUTPATIENT
Start: 2024-12-03 | End: 2024-12-04

## 2024-12-03 RX ORDER — OXYCODONE HYDROCHLORIDE 5 MG/1
2.5 TABLET ORAL EVERY 6 HOURS PRN
Status: DISCONTINUED | OUTPATIENT
Start: 2024-12-03 | End: 2024-12-04

## 2024-12-03 RX ORDER — NALOXONE HYDROCHLORIDE 0.4 MG/ML
0.08 INJECTION, SOLUTION INTRAMUSCULAR; INTRAVENOUS; SUBCUTANEOUS AS NEEDED
Status: DISCONTINUED | OUTPATIENT
Start: 2024-12-03 | End: 2024-12-03 | Stop reason: HOSPADM

## 2024-12-03 RX ORDER — DOCUSATE SODIUM 100 MG/1
100 CAPSULE, LIQUID FILLED ORAL 2 TIMES DAILY
Status: DISCONTINUED | OUTPATIENT
Start: 2024-12-03 | End: 2024-12-04

## 2024-12-03 RX ORDER — HYDROMORPHONE HYDROCHLORIDE 1 MG/ML
0.4 INJECTION, SOLUTION INTRAMUSCULAR; INTRAVENOUS; SUBCUTANEOUS EVERY 5 MIN PRN
Status: DISCONTINUED | OUTPATIENT
Start: 2024-12-03 | End: 2024-12-03 | Stop reason: HOSPADM

## 2024-12-03 RX ORDER — EPHEDRINE SULFATE 50 MG/ML
INJECTION, SOLUTION INTRAVENOUS AS NEEDED
Status: DISCONTINUED | OUTPATIENT
Start: 2024-12-03 | End: 2024-12-03 | Stop reason: SURG

## 2024-12-03 RX ORDER — NICOTINE POLACRILEX 4 MG
15 LOZENGE BUCCAL
Status: DISCONTINUED | OUTPATIENT
Start: 2024-12-03 | End: 2024-12-04

## 2024-12-03 RX ORDER — MIDAZOLAM HYDROCHLORIDE 1 MG/ML
INJECTION INTRAMUSCULAR; INTRAVENOUS AS NEEDED
Status: DISCONTINUED | OUTPATIENT
Start: 2024-12-03 | End: 2024-12-03 | Stop reason: SURG

## 2024-12-03 RX ORDER — LISINOPRIL 20 MG/1
40 TABLET ORAL DAILY
Status: DISCONTINUED | OUTPATIENT
Start: 2024-12-04 | End: 2024-12-04

## 2024-12-03 RX ORDER — METOCLOPRAMIDE HYDROCHLORIDE 5 MG/ML
10 INJECTION INTRAMUSCULAR; INTRAVENOUS EVERY 8 HOURS PRN
Status: DISCONTINUED | OUTPATIENT
Start: 2024-12-03 | End: 2024-12-03 | Stop reason: HOSPADM

## 2024-12-03 RX ORDER — ACETAMINOPHEN 500 MG
1000 TABLET ORAL ONCE
Status: COMPLETED | OUTPATIENT
Start: 2024-12-03 | End: 2024-12-03

## 2024-12-03 RX ORDER — HYDROMORPHONE HYDROCHLORIDE 1 MG/ML
0.6 INJECTION, SOLUTION INTRAMUSCULAR; INTRAVENOUS; SUBCUTANEOUS EVERY 5 MIN PRN
Status: DISCONTINUED | OUTPATIENT
Start: 2024-12-03 | End: 2024-12-03 | Stop reason: HOSPADM

## 2024-12-03 RX ORDER — HYDROMORPHONE HYDROCHLORIDE 1 MG/ML
0.2 INJECTION, SOLUTION INTRAMUSCULAR; INTRAVENOUS; SUBCUTANEOUS EVERY 5 MIN PRN
Status: DISCONTINUED | OUTPATIENT
Start: 2024-12-03 | End: 2024-12-03 | Stop reason: HOSPADM

## 2024-12-03 RX ORDER — GLYCOPYRROLATE 0.2 MG/ML
INJECTION, SOLUTION INTRAMUSCULAR; INTRAVENOUS AS NEEDED
Status: DISCONTINUED | OUTPATIENT
Start: 2024-12-03 | End: 2024-12-03 | Stop reason: SURG

## 2024-12-03 RX ORDER — POLYETHYLENE GLYCOL 3350 17 G/17G
17 POWDER, FOR SOLUTION ORAL DAILY PRN
Status: DISCONTINUED | OUTPATIENT
Start: 2024-12-03 | End: 2024-12-04

## 2024-12-03 RX ORDER — HEPARIN SODIUM 5000 [USP'U]/ML
5000 INJECTION, SOLUTION INTRAVENOUS; SUBCUTANEOUS EVERY 12 HOURS SCHEDULED
Status: DISCONTINUED | OUTPATIENT
Start: 2024-12-03 | End: 2024-12-04

## 2024-12-03 RX ORDER — PHENYLEPHRINE HCL 10 MG/ML
VIAL (ML) INJECTION AS NEEDED
Status: DISCONTINUED | OUTPATIENT
Start: 2024-12-03 | End: 2024-12-03 | Stop reason: SURG

## 2024-12-03 RX ORDER — DEXTROSE MONOHYDRATE 25 G/50ML
50 INJECTION, SOLUTION INTRAVENOUS
Status: DISCONTINUED | OUTPATIENT
Start: 2024-12-03 | End: 2024-12-03 | Stop reason: HOSPADM

## 2024-12-03 RX ORDER — AMLODIPINE BESYLATE 5 MG/1
5 TABLET ORAL NIGHTLY
Status: DISCONTINUED | OUTPATIENT
Start: 2024-12-03 | End: 2024-12-04

## 2024-12-03 RX ORDER — LIDOCAINE HYDROCHLORIDE ANHYDROUS AND DEXTROSE MONOHYDRATE .8; 5 G/100ML; G/100ML
INJECTION, SOLUTION INTRAVENOUS CONTINUOUS PRN
Status: DISCONTINUED | OUTPATIENT
Start: 2024-12-03 | End: 2024-12-03 | Stop reason: SURG

## 2024-12-03 RX ORDER — LIDOCAINE HYDROCHLORIDE 10 MG/ML
INJECTION, SOLUTION EPIDURAL; INFILTRATION; INTRACAUDAL; PERINEURAL AS NEEDED
Status: DISCONTINUED | OUTPATIENT
Start: 2024-12-03 | End: 2024-12-03 | Stop reason: SURG

## 2024-12-03 RX ORDER — NICOTINE POLACRILEX 4 MG
15 LOZENGE BUCCAL
Status: DISCONTINUED | OUTPATIENT
Start: 2024-12-03 | End: 2024-12-03 | Stop reason: HOSPADM

## 2024-12-03 RX ORDER — SODIUM CHLORIDE 9 MG/ML
INJECTION, SOLUTION INTRAVENOUS CONTINUOUS
Status: DISCONTINUED | OUTPATIENT
Start: 2024-12-03 | End: 2024-12-03

## 2024-12-03 RX ORDER — DEXAMETHASONE SODIUM PHOSPHATE 4 MG/ML
VIAL (ML) INJECTION AS NEEDED
Status: DISCONTINUED | OUTPATIENT
Start: 2024-12-03 | End: 2024-12-03 | Stop reason: SURG

## 2024-12-03 RX ORDER — ROCURONIUM BROMIDE 10 MG/ML
INJECTION, SOLUTION INTRAVENOUS AS NEEDED
Status: DISCONTINUED | OUTPATIENT
Start: 2024-12-03 | End: 2024-12-03 | Stop reason: SURG

## 2024-12-03 RX ORDER — NICOTINE POLACRILEX 4 MG
30 LOZENGE BUCCAL
Status: DISCONTINUED | OUTPATIENT
Start: 2024-12-03 | End: 2024-12-04

## 2024-12-03 RX ORDER — BUPIVACAINE HYDROCHLORIDE 5 MG/ML
INJECTION, SOLUTION EPIDURAL; INTRACAUDAL AS NEEDED
Status: DISCONTINUED | OUTPATIENT
Start: 2024-12-03 | End: 2024-12-03 | Stop reason: HOSPADM

## 2024-12-03 RX ORDER — HYDROMORPHONE HYDROCHLORIDE 1 MG/ML
0.4 INJECTION, SOLUTION INTRAMUSCULAR; INTRAVENOUS; SUBCUTANEOUS EVERY 4 HOURS PRN
Status: DISCONTINUED | OUTPATIENT
Start: 2024-12-03 | End: 2024-12-04

## 2024-12-03 RX ORDER — DEXTROSE MONOHYDRATE 25 G/50ML
50 INJECTION, SOLUTION INTRAVENOUS
Status: DISCONTINUED | OUTPATIENT
Start: 2024-12-03 | End: 2024-12-04

## 2024-12-03 RX ORDER — ONDANSETRON 2 MG/ML
4 INJECTION INTRAMUSCULAR; INTRAVENOUS EVERY 6 HOURS PRN
Status: DISCONTINUED | OUTPATIENT
Start: 2024-12-03 | End: 2024-12-04

## 2024-12-03 RX ORDER — SODIUM CHLORIDE, SODIUM LACTATE, POTASSIUM CHLORIDE, CALCIUM CHLORIDE 600; 310; 30; 20 MG/100ML; MG/100ML; MG/100ML; MG/100ML
INJECTION, SOLUTION INTRAVENOUS CONTINUOUS PRN
Status: DISCONTINUED | OUTPATIENT
Start: 2024-12-03 | End: 2024-12-03 | Stop reason: SURG

## 2024-12-03 RX ORDER — ROSUVASTATIN CALCIUM 20 MG/1
40 TABLET, COATED ORAL NIGHTLY
Status: DISCONTINUED | OUTPATIENT
Start: 2024-12-03 | End: 2024-12-04

## 2024-12-03 RX ORDER — SODIUM CHLORIDE, SODIUM LACTATE, POTASSIUM CHLORIDE, CALCIUM CHLORIDE 600; 310; 30; 20 MG/100ML; MG/100ML; MG/100ML; MG/100ML
INJECTION, SOLUTION INTRAVENOUS CONTINUOUS
Status: DISCONTINUED | OUTPATIENT
Start: 2024-12-03 | End: 2024-12-03 | Stop reason: HOSPADM

## 2024-12-03 RX ORDER — ONDANSETRON 2 MG/ML
INJECTION INTRAMUSCULAR; INTRAVENOUS AS NEEDED
Status: DISCONTINUED | OUTPATIENT
Start: 2024-12-03 | End: 2024-12-03 | Stop reason: SURG

## 2024-12-03 RX ADMIN — PHENYLEPHRINE HCL 100 MCG: 10 MG/ML VIAL (ML) INJECTION at 11:15:00

## 2024-12-03 RX ADMIN — PHENYLEPHRINE HCL 100 MCG: 10 MG/ML VIAL (ML) INJECTION at 10:18:00

## 2024-12-03 RX ADMIN — PHENYLEPHRINE HCL 100 MCG: 10 MG/ML VIAL (ML) INJECTION at 10:56:00

## 2024-12-03 RX ADMIN — PHENYLEPHRINE HCL 100 MCG: 10 MG/ML VIAL (ML) INJECTION at 11:51:00

## 2024-12-03 RX ADMIN — ROCURONIUM BROMIDE 20 MG: 10 INJECTION, SOLUTION INTRAVENOUS at 10:30:00

## 2024-12-03 RX ADMIN — EPHEDRINE SULFATE 10 MG: 50 INJECTION, SOLUTION INTRAVENOUS at 11:24:00

## 2024-12-03 RX ADMIN — PHENYLEPHRINE HCL 100 MCG: 10 MG/ML VIAL (ML) INJECTION at 10:42:00

## 2024-12-03 RX ADMIN — PHENYLEPHRINE HCL 100 MCG: 10 MG/ML VIAL (ML) INJECTION at 10:28:00

## 2024-12-03 RX ADMIN — LIDOCAINE HYDROCHLORIDE 50 MG: 10 INJECTION, SOLUTION EPIDURAL; INFILTRATION; INTRACAUDAL; PERINEURAL at 07:39:00

## 2024-12-03 RX ADMIN — GLYCOPYRROLATE 0.2 MG: 0.2 INJECTION, SOLUTION INTRAMUSCULAR; INTRAVENOUS at 07:32:00

## 2024-12-03 RX ADMIN — ROCURONIUM BROMIDE 10 MG: 10 INJECTION, SOLUTION INTRAVENOUS at 12:41:00

## 2024-12-03 RX ADMIN — PHENYLEPHRINE HCL 100 MCG: 10 MG/ML VIAL (ML) INJECTION at 11:59:00

## 2024-12-03 RX ADMIN — SODIUM CHLORIDE, SODIUM LACTATE, POTASSIUM CHLORIDE, CALCIUM CHLORIDE: 600; 310; 30; 20 INJECTION, SOLUTION INTRAVENOUS at 07:40:00

## 2024-12-03 RX ADMIN — ROCURONIUM BROMIDE 20 MG: 10 INJECTION, SOLUTION INTRAVENOUS at 08:59:00

## 2024-12-03 RX ADMIN — MIDAZOLAM HYDROCHLORIDE 2 MG: 1 INJECTION INTRAMUSCULAR; INTRAVENOUS at 07:33:00

## 2024-12-03 RX ADMIN — LIDOCAINE HYDROCHLORIDE ANHYDROUS AND DEXTROSE MONOHYDRATE 1.5 MG/KG/HR: .8; 5 INJECTION, SOLUTION INTRAVENOUS at 07:44:00

## 2024-12-03 RX ADMIN — PHENYLEPHRINE HCL 100 MCG: 10 MG/ML VIAL (ML) INJECTION at 11:05:00

## 2024-12-03 RX ADMIN — DEXAMETHASONE SODIUM PHOSPHATE 8 MG: 4 MG/ML VIAL (ML) INJECTION at 07:33:00

## 2024-12-03 RX ADMIN — ROCURONIUM BROMIDE 50 MG: 10 INJECTION, SOLUTION INTRAVENOUS at 07:39:00

## 2024-12-03 RX ADMIN — PHENYLEPHRINE HCL 200 MCG: 10 MG/ML VIAL (ML) INJECTION at 08:59:00

## 2024-12-03 RX ADMIN — ONDANSETRON 4 MG: 2 INJECTION INTRAMUSCULAR; INTRAVENOUS at 07:32:00

## 2024-12-03 RX ADMIN — SODIUM CHLORIDE: 9 INJECTION, SOLUTION INTRAVENOUS at 11:41:00

## 2024-12-03 RX ADMIN — SODIUM CHLORIDE, SODIUM LACTATE, POTASSIUM CHLORIDE, CALCIUM CHLORIDE: 600; 310; 30; 20 INJECTION, SOLUTION INTRAVENOUS at 13:29:00

## 2024-12-03 NOTE — OPERATIVE REPORT
[facility]  part of [facility]  Urology Operative Note         Mario Loera Location: OR   [facility] [id number] MRN [id number]   Admission Date 12/3/2024 Operation Date 12/3/2024   Attending Physician Jose Angel Turpin MD       Patient Name: Mario Loera     Preoperative Diagnosis: Prostate cancer (HCC) [C61]     Postoperative Diagnosis: Prostate cancer (HCC) [C61]     Procedure(s): XI Robot-assisted laparoscopic radical prostatectomy, bilateral pelvic lymph node dissection, laparoscopic lysis of adhesions, primary repair of recurrent umbilical hernia.    Modifier 22 is being added due to significant increase in length and technical difficulty of the prostatectomy related to the presence of significant omental and small bowel adhesions in the right lower quadrant as well as the anterior abdominal wall related to patient's past surgical history.  30 minutes were spent performing laparoscopic lysis of adhesions prior to the prostatectomy to ensure safe port placement and facilitate the procedure.    Primary Surgeon: Jose Angel Turpin MD     Surgical Assistant.: Cheryl Salazar     Anesthesia: General     Specimen:   ID Type Source Tests Collected by Time Destination   1 : 1. Abi prostatic fat Tissue Abi prostatic fat SURGICAL PATHOLOGY TISSUE Jose Angel Turpin MD 12/3/2024  9:50 AM    2 : 2. Bilateral pelvic lymph nodes Tissue Lymph node SURGICAL PATHOLOGY TISSUE Jose Angel Turpin MD 12/3/2024  9:54 AM    3 : 3. Prostate, bilateral seminal vesicles, and bilateral ampulla of vas Tissue Prostate SURGICAL PATHOLOGY TISSUE Jose Angel Turpin MD 12/3/2024 12:19 PM       Estimated Blood Loss: 50 mL.  Complications: None   Indications for procedure: Patient is a 64-year-old male who was diagnosed with clinically localized favorable intermediate risk prostate cancer upon prostate biopsy performed 6/13/2024 for evaluation of elevated screening PSA levels and an abnormal prostate MRI.  Prostate MRI showed organ  confined disease.  Management options were discussed with the patient who elected to proceed with a robotic radical prostatectomy and pelvic lymph node dissection for initial definitive local therapy.  We discussed rationale, approach, benefits, risks, possible complications, and reasonable alternatives of treatment.  We discussed the expected postoperative course of recovery.  We discussed surgical risks including not limited to medical and anesthetic complications, bleeding, infection, damage to surrounding organs or structures, possible need for additional procedures.  We discussed the risks of infertility, urinary incontinence and erectile dysfunction.  Patient verbalized understanding and wishes to proceed.  He was preoperatively medically optimized for the surgery. He denies any new complaints.  Patient asked if we can repair his recurrent umbilical hernia during the procedure.  I explained to him that we can potentially do that if the clinical situation allows however I would not be using any mesh due to the clean contaminated operative field from his radical prostatectomy.  The risk of recurrence was discussed.  He verbalized understanding and agrees to proceed.    Surgical Findings:   1.  Omental and small bowel adhesions in the right lower quadrant as well as the anterior abdominal wall related to past surgical history.  30-40 minutes of laparoscopic lysis of adhesions performed to facilitate robotic port placement as well as safe completion of the procedure.    2.  Nerve sparing robotic radical prostatectomy occurred with bilateral pelvic lymph node dissection.  All gross potential disease was fully resected with negative surgically visible margins.  Watertight vesicourethral anastomosis confirmed by bladder filling.    3.  Recurrent fat-containing umbilical hernia.  Primary umbilical hernia repair performed.    Complexity: High (optional) due to significant intraabdominal adhesions from previous hernia  repair and appendectomy.     Operative Summary: The patient was brought to the Operating Room. Patient identification was reconfirmed prior to anesthesia in the Operating Room, and intravenous antibiotics were administered for infection prophylaxis. Bilateral lower extremity compression devices were placed for DVT prevention and general endotracheal anesthesia was obtained without difficulty. A standard universal time-out procedure was carried out after anesthesia induction and prior to starting the procedure. The patient was positioned supine on the operating table with the arms tucked and all pressure areas and bony prominences well padded. The patient was positioned to be stable in steep Trendelenburg during surgery without excessive patient movement. The patient's lower abdomen and genitalia were shaved, prepped and draped in routine fashion, and the procedure was initiated.    After standard draping, sterilely on the field a 22-Fr Chicas catheter was placed to the level of the bladder and the bladder emptied. The procedure was initiated by establishing a pneumoperitoneum by passage of a Veress needle at the level above the umbilicus in the midline into the intraperitoneal cavity, with appropriate needle position confirmed clinically. A pneumoperitoneum to 12 mm of mercury pressure was established without difficulty, with the patient showing no adverse hemodynamic or respiratory function change, and an initial trocar was placed in the midline superior to the umbilicus using a 8-mm robotic trocar.  Through this initial trocar, intraabdominal laparoscopy was performed.    INTRA-ABDOMINAL FINDINGS:  There were significant intraabdominal adhesions of the omentum and small bowel to the anterior abdominal wall in the midline around the level of the umbilicus as well as in the right lower quadrant.  This was thought to be related to patient's past surgical history.  There is also a recurrent fat-containing umbilical  hernia. No bleeding, bowel abnormality or injury, or other intraabdominal pathology recognized.    I then inserted the 5 mm assistant port in the right hemiabdomen under direct visualization.  I was also able to insert the left lateralmost 8 mm robotic port under direct visualization.  At this point, laparoscopic lysis of adhesions was indicated to facilitate safe placement of the rest of the ports as well as facilitate the robotic radical prostatectomy.    A Maryland tip laparoscopic LigaSure was introduced and laparoscopic lysis of adhesions was performed under direct visualization using blunt and sharp dissection with the careful use of electrocautery.  Those were taken down thus clearing the anterior abdominal wall.  The contents of the fat-containing umbilical hernia were reduced into the abdominal cavity.  This added an additional 30-40 minutes of operative time to this procedure.  I then was able to introduce 2 additional 8 mm robotic trocars as well as a 12 mm air seal port all under direct visualization in the usual locations across the abdomen.    Once all 5 working ports were placed, the robot was brought to the field, docked and procedure was initiated.    INITIATION OF PROCEDURE:  The procedure was started using a 0-degree lens scope, using the monopolar scissors in the near right lateral arm, the Maryland bipolar in the near left lateral arm, and the ProGrasp in the far left lateral arm. Initial dissection was performed posterior to the bladder to isolate and mobilize the vas deferens and seminal vesicles in the midline beneath and below the bladder at the level of the cul-de-sac. The peritoneum was incised horizontally to reveal the vasa, which were isolated for approximately 2 cm proximal to the ampulla and divided with cautery. This was performed bilaterally with careful dissection ensuring appropriate recognition of the vasa. The seminal vesicles were then identified, mobilized, dissected and freed  bilaterally using blunt and sharp dissection. With the vasa and seminal vesicles isolated and freed bilaterally, they were grasped for anterior retraction and a plane was established in the midline between the vasa and seminal vesicles and the rectum, and this dissection was continued distally along the rectum below Denonvilliers fascia, between the rectum and prostate. Once adequate posterior midline dissection was achieved at the vasa and prostate base, attention was subsequently directed towards anterior dissection at the space of Retzius.    The anterior peritoneum was incised lateral to the medial umbilical ligaments to enter into the extraperitoneal, extravesical space. The anterior peritoneum was incised laterally to the level of the internal ring, and subsequently dissection was continued into the pelvis parallel with the course of the vasa into the pelvis, incising the peritoneum in this manner to widely mobilize the superior extent of the bladder. With the space of Retzius entered, dissection was carried distally to the pubic symphysis through the avascular areolar tissues. The lateral dissection was sufficiently beyond the internal ring to leave a wide margin of anterior peritoneum to cover it if needed at the end of the procedure. With the pubic symphysis identified, gentle blunt dissection was utilized to fully expose the endopelvic fascia, which was defatted, and excellent visualization was obtained with the general position of the prostate and transition to the bladder appreciated. The outline of the prostate and the bladder neck junction were seen beneath the endopelvic fascia, and the fascia was subsequently incised immediately lateral to the prostate with the underlying levator muscles visualized.  The levator ani muscles were swept off the prostate with good exposure of the lateral extent of the prostate and the prostatic apex, but limited apical dissection was performed at this time. The Dorsal  venous complex was addressed at this point. It was identified, mobilized and dissected, then stapled and divided with excellent hemostasis using a 30 mm purple load of the EndoGIA stapler.    At the mid-prostate and near the bladder neck junction, an anterior cystotomy site was localized. The bladder was divided with cautery immediately proximal to the prostate with entry into the bladder first occurring in an anterior location, and once an anterior cystotomy was performed, the Chicas catheter balloon was released and the tip of the Chicas catheter was seen and brought through the cystotomy. Good visualization of the prostatic urethra through the cystotomy confirmed the appropriate location had been chosen for bladder neck transection, with the posterior bladder neck well visualized. Using the ProGrasp good anterior retraction of the the prostate was obtained with excellent visualization of the posterior bladder neck.    No significant BPH or median prostate lobe was seen once the bladder was opened, and posterior dissection was continued per routine.    At this point, dissection was continued to circumferentially separate the bladder from the prostate, although the degree of dissection with cautery at the lateral aspects and lateral edges of the bladder neck junction was carefully limited to minimize potential thermal injury to the more laterally located neurovascular bundle. Dissection was carried through the posterior bladder neck full thickness in the midline below the prostate base, and this allowed exposure and entry into the posterior space, where the vasa had been isolated to start the surgery.    The vasa and the seminal vesicles were brought through the midline and retracted anteriorly, giving exposure to the lateral edges of perivesical tissue and to the more lateral prostate pedicles - these components were subsequently addressed.    PROSTATE PEDICLE DISSECTION:  The tissue surrounding the prostate was  clinically normal and routine antegrade neurovascular bundle preservation was performed bilaterally.     Dissection was performed on the right side and careful attention was made to strictly avoid use of any thermal energy in the course of the lateral dissection, to optimally maintain the integrity of the further laterally located neurovascular bundle. Sequential isolation and division of components of the prostatic pedicle was performed, with each small packet of tissue with vessels ligated with surgical clips, then sharply divided with the robotic cece, again without use of any cautery.      Dissection was performed on the left side and careful attention was made to strictly avoid use of any thermal energy in the course of the lateral dissection, to optimally maintain the integrity of the further laterally located neurovascular bundle. Sequential isolation and division of components of the prostatic pedicle was performed, with each small packet of tissue with vessels ligated with surgical clips, then sharply divided with the robotic cece, again without use of any cautery.      On completion of bilateral lateral dissection, excellent exposure of the posterior and lateral edges of the prostate was achieved distally to the urethra itself, beyond the prostate apex, with consistent nerve preservation at this level towards the pelvic floor as along the lateral prostate margin. With the posterior and lateral aspects of the dissection completed, attention was turned anteriorly for dissection of the prostatic apex. The anterior periprostatic dissection was limited to maintain the integrity of the anterior endopelvic fascia, with the puboprostatic ligaments not specifically divided.  The urethra was fully isolated circumferentially and the prostate apex margin was well visualized and confirmed to be completely included on the specimen. At this point, the remaining attachment of the specimen was at the urethra, which was  sharply divided, and an excellent urethral stump was achieved. The specimen was inspected intra-corporally and showed no abnormality or sign of cancer extension. It was entrapped in a surgical specimen bag.    LYMPH NODE DISSECTION:  Bilateral pelvic lymph node dissection was then undertaken using standard templates, with the external iliac vein being the lateral margin, Lazaro's ligament the distal margin, the obturator nerve and artery as the medial margin, and the proximal external iliac vein as the proximal margin. Identical templates for dissection were carried out bilaterally, first on the right and then on the left side, with specimens from the lymph node template grossly normal and removed from the field and submitted for permanent pathologic assessment.    URETHRAL ANASTOMOSIS:  With the prostatectomy and lymph node dissection completed, attention was turned towards the urinary reconstruction and urethrovesical anastomosis. First, the bladder was brought lower into the pelvis using a 3-0 V-loc suture utilizing the Agus technique, approximating the cut edge of the posterior peritoneum behind the bladder to the rectourethralis fascia, and this made the actual anastomosis tension-free.  We confirmed good hemostasis before running the anastomosis. Double-armed #2-0 Quill  were then placed about the six o'clock position of the posterior bladder neck and a continuous running anastomosis was constructed with each needle progressing in opposite directions towards the twelve o'clock point, where the ends were secured in a single knot. On completion of the anastomosis, there was a clear watertight closure, which was confirmed on irrigation and distension of the bladder with 120 cc of sterile water. A new Chicas catheter was placed and passed easily into the bladder across the anastomosis; it was set to straight drainage with 12 cc placed in the Chicas catheter balloon. The primary procedure was subsequently  complete.    UMBILICAL HERNIA REPAIR AND COMPLETION OF PROCEDURE:  At this point, we turned our attention to performing the umbilical hernia repair.  I elected to perform that robotically.  The camera was hopped to the #1 arm.  I was able to visualize the hernia defect after the fat contents were reduced.  It was about 1.5 to 2 cm long.  The fascia was identified on the abdominal side.  A 0 V-Loc suture was then introduced and primary closure of the fascia at the umbilical hernia site was performed in a running fashion.  The defect was closed.  It was tension-free.  The 12 mm assistant port fascia was then closed using an endoscopic fascial closure device and 0-vicryl suture under direct visualization. All trocars were removed under direct vision, with no evidence of hemorrhage or abnormality seen at any of the sites when visualized internally. Hemostasis in the pelvis was excellent on final inspection prior to trocar removal. The primary specimen was extracted through the supraumbilical site, which was enlarged to accommodate the size of the prostate.    The supraumbilical incision was closed primarily with running #0 Vicryl suture to approximate the anterior fascia.  The subcutaneous tissues at the extraction site were approximated using interrupted 3-0 Vicryl.    The remaining trocar incisions did not require primary fascial closure given their size and location. The overlying soft tissue at all incisions was well irrigated and infused with 0.5% Marcaine for postoperative analgesia. All incisions were subsequently closed only at the skin with absorbable #4 Monocryl suture and reinforced with Dermabond. Dry dressing was placed over the drain location using a Biopatch and a Tegaderm.    The patient was extubated uneventfully in the Operating Room. He had stable vital signs throughout the surgery and was transferred to the PACU for routine monitoring having tolerated the procedure well without any immediate  complications.     All lap pad, instrument, needle, and sponge counts were correct x2 at the end of the procedure.       Implants: * No implants in log *     Drains: 18 Indian urethral Chicas catheter to straight drainage.  12 mL of sterile water in the balloon.    Condition: Extubated and stable to PACU.    I was present and either scrubbed or on the robotic console and performed the procedure in its entirety. At patient's request, findings were discussed with his girlfriend following the procedure.      Jose Angel Turpin MD

## 2024-12-03 NOTE — ANESTHESIA PROCEDURE NOTES
Airway  Date/Time: 12/3/2024 7:41 AM  Urgency: Elective    Airway not difficult    General Information and Staff    Patient location during procedure: OR  Anesthesiologist: Alon Morales MD  Performed: anesthesiologist   Performed by: Alon Morales MD  Authorized by: Alon Morales MD      Indications and Patient Condition  Indications for airway management: anesthesia  Sedation level: deep  Preoxygenated: yes  Patient position: sniffing  Mask difficulty assessment: 1 - vent by mask    Final Airway Details  Final airway type: endotracheal airway      Successful airway: ETT  Cuffed: yes   Successful intubation technique: Video laryngoscopy  Endotracheal tube insertion site: oral  Blade type: Finney.  Blade size: #3  ETT size (mm): 7.5    Cormack-Lehane Classification: grade I - full view of glottis  Placement verified by: capnometry   Cuff volume (mL): 8  Measured from: teeth  ETT to teeth (cm): 22  Number of attempts at approach: 1

## 2024-12-03 NOTE — PROGRESS NOTES
[facility] Dosing Service  Antibiotic Dosing    Mario Loera is a 64 year old for whom pharmacy was consulted to dose ceftriaxone (ROCEPHIN) for treatment of surgical prophylaxis.    CrCl: CrCl cannot be calculated (Patient's most recent lab result is older than the maximum 7 days allowed.).    Actual Body Weight:   Wt Readings from Last 1 Encounters:   11/26/24 97.5 kg (215 lb)    Ideal body weight: 75.3 kg (166 lb 0.1 oz)  Adjusted ideal body weight: 84.2 kg (185 lb 9.7 oz)   Body mass index is 29.99 kg/m².    ceftriaxone (ROCEPHIN) has been ordered per P&T approved protocol.    Thank you,   Abraham Mendez, PharmD  12/3/2024  6:56 AM

## 2024-12-03 NOTE — ANESTHESIA POSTPROCEDURE EVALUATION
Patient: Mario Loera    Procedure Summary       Date: 12/03/24 Room / Location: [facility] MAIN OR 06 / [facility] MAIN OR    Anesthesia Start: 0732 Anesthesia Stop: 1352    Procedure: XI Robot-assisted laparoscopic radical prostatectomy, bilateral pelvic lymph node dissection, laparoscopic lysis of adhesions, primary umbilical hernia repair (Abdomen) Diagnosis:       Prostate cancer (HCC)      (Prostate cancer (HCC) [C61])    Surgeons: Jose Angel Turpin MD Anesthesiologist: Alon Morales MD    Anesthesia Type: general ASA Status: 2            Anesthesia Type: general    Vitals Value Taken Time   /65 12/03/24 1350   Temp 98 12/03/24 1353   Pulse 96 12/03/24 1352   Resp 25 12/03/24 1352   SpO2 92 % 12/03/24 1352   Vitals shown include unfiled device data.    [facility] AN Post Evaluation:   Patient Evaluated in PACU  Patient Participation: complete - patient participated  Level of Consciousness: awake  Airway Patency:patent  Dental exam unchanged from preop  Yes    Nausea/Vomiting: none  Cardiovascular Status: acceptable  Respiratory Status: acceptable  Postoperative Hydration acceptable      Alon Morales MD  12/3/2024 1:53 PM

## 2024-12-03 NOTE — ANESTHESIA PREPROCEDURE EVALUATION
Anesthesia PreOp Note    HPI:     Mario Loera is a 64 year old male who presents for preoperative consultation requested by: Jose Angel Turpin MD    Date of Surgery: 12/3/2024    Procedure(s):  XI Robot-assisted laparoscopic radical prostatectomy, bilateral pelvic lymph node dissection  Indication: Prostate cancer (HCC) [C61]    Relevant Problems   No relevant active problems       NPO:  Last Liquid Consumption Date: 12/02/24  Last Liquid Consumption Time: 1900  Last Solid Consumption Date: 12/02/24  Last Solid Consumption Time: 1900  Last Liquid Consumption Date: 12/02/24          History Review:  Patient Active Problem List    Diagnosis Date Noted    Prostate mass 11/05/2024    Pre-operative general physical examination 11/05/2024    Neuropathy of right foot 02/09/2024    Leg cramps 02/17/2022    Prostate cancer screening 09/16/2021    Immunization due 09/16/2021    History of BPH 02/06/2021    Well adult exam 02/06/2021    Essential hypertension 02/06/2021    Controlled type 2 diabetes mellitus without complication, without long-term current use of insulin (HCC) 07/30/2020    Carotid atherosclerosis, bilateral 10/10/2019    Hypercholesteremia 02/23/2017    Abnormal ECG 02/23/2017       Past Medical History:    Calculus of kidney    High blood pressure    High cholesterol    Kidney stones    Other and unspecified hyperlipidemia    Prostatitis    Type II or unspecified type diabetes mellitus without mention of complication, not stated as uncontrolled    Unspecified essential hypertension       Past Surgical History:   Procedure Laterality Date    Appendectomy      Colonoscopy      Colonoscopy N/A 12/30/2019    Procedure: COLONOSCOPY;  Surgeon: Simon Alvarez MD;  Location: [facility] ENDOSCOPY    Hernia surgery      Thoracotomy,excis bullae      as infant for empyema       Prescriptions Prior to Admission[1]  Current Medications and Prescriptions Ordered in Epic[2]    Allergies[3]    Family History   Problem  Relation Age of Onset    Heart Disease Father 69        had [address] and 3 way bypass.    Cancer Father         Prostate    Heart Disease Paternal Aunt     Heart Disease Mother     Cancer Other         Prostate    Cancer Paternal Grandfather         prostate cancer     Social History     Socioeconomic History    Marital status: Single   Tobacco Use    Smoking status: Never     Passive exposure: Never    Smokeless tobacco: Never   Vaping Use    Vaping status: Never Used   Substance and Sexual Activity    Alcohol use: No    Drug use: No       Available pre-op labs reviewed.  Lab Results   Component Value Date    WBC 5.9 11/23/2024    RBC 4.63 11/23/2024    HGB 14.1 11/23/2024    HCT 39.7 11/23/2024    MCV 85.7 11/23/2024    MCH 30.5 11/23/2024    MCHC 35.5 11/23/2024    RDW 12.9 11/23/2024    .0 11/23/2024     Lab Results   Component Value Date     11/23/2024    K 4.5 11/23/2024     11/23/2024    CO2 25.0 11/23/2024    BUN 18 11/23/2024    CREATSERUM 1.16 11/23/2024     (H) 11/23/2024    CA 10.2 11/23/2024     Lab Results   Component Value Date    INR 0.86 11/23/2024       Vital Signs:  Body mass index is 29.29 kg/m².   height is 1.803 m (5' 11\") and weight is 95.3 kg (210 lb). His oral temperature is 97.3 °F (36.3 °C). His blood pressure is 146/73 and his pulse is 69. His respiration is 16 and oxygen saturation is 97%.   Vitals:    11/26/24 0748 12/03/24 0703   BP:  146/73   Pulse:  69   Resp:  16   Temp:  97.3 °F (36.3 °C)   TempSrc:  Oral   SpO2:  97%   Weight: 97.5 kg (215 lb) 95.3 kg (210 lb)   Height: 1.803 m (5' 11\") 1.803 m (5' 11\")        Anesthesia Evaluation     Patient summary reviewed and Nursing notes reviewed    Airway   Mallampati: II  TM distance: <3 FB  Neck ROM: limited  Dental - Dentition appears grossly intact     Pulmonary - negative ROS and normal exam   Cardiovascular - normal exam  (+) hypertension    Neuro/Psych - negative ROS     GI/Hepatic/Renal - negative ROS      Endo/Other    (+) diabetes mellitus  Abdominal  - normal exam                 Anesthesia Plan:   ASA:  2  Plan:   General  Airway:  ETT  Informed Consent Plan and Risks Discussed With:  Patient      I have informed Mario Loera and/or legal guardian or family member of the nature of the anesthetic plan, benefits, risks including possible dental damage if relevant, major complications, and any alternative forms of anesthetic management.   All of the patient's questions were answered to the best of my ability. The patient desires the anesthetic management as planned.  Alon Morales MD  12/3/2024 7:16 AM  Present on Admission:  **None**           [1]   Medications Prior to Admission   Medication Sig Dispense Refill Last Dose/Taking    finasteride 5 MG Oral Tab Take 1 tablet (5 mg total) by mouth daily. 90 tablet 3 12/2/2024 at  6:00 PM    lisinopril 40 MG Oral Tab Take 1 tablet (40 mg total) by mouth daily. 90 tablet 3 12/2/2024 at  8:00 AM    rosuvastatin 40 MG Oral Tab Take 1 tablet (40 mg total) by mouth nightly. 90 tablet 3 12/2/2024 at  6:00 PM    amLODIPine 5 MG Oral Tab Take 1 tablet (5 mg total) by mouth daily. (Patient taking differently: Take 1 tablet (5 mg total) by mouth nightly.) 90 tablet 3 12/2/2024 at  6:00 PM    metFORMIN HCl 1000 MG Oral Tab Take 1 tablet (1,000 mg total) by mouth 2 (two) times daily with meals. 180 tablet 0 12/2/2024 at  6:00 PM    Ascorbic Acid (VITAMIN C OR) Take by mouth.   12/2/2024 at  8:00 AM    Multiple Vitamins-Minerals (MULTIVITAMIN MEN OR) Take by mouth.   12/2/2024 at  8:00 AM    FreeStyle Lancets Does not apply Misc 1 Lancet by Finger stick route daily. 100 each 0     Glucose Blood (FREESTYLE LITE TEST) In Vitro Strip 1 strip by Other route daily. Check blood sugar daily 100 strip 3     aspirin 81 MG Oral Tab Take 1 tablet by mouth daily. 100 tablet 3 11/23/2024   [2]   Current Facility-Administered Medications Ordered in Epic   Medication Dose Route Frequency Provider  Last Rate Last Admin    sodium chloride 0.9% infusion   Intravenous Continuous Jose Angel Turpin MD 20 mL/hr at 12/03/24 0716 New Bag at 12/03/24 0716    cefTRIAXone (Rocephin) 2 g in sodium chloride 0.9% 100 mL IVPB-ADDV  2 g Intravenous 30 Min Pre-Op Jose Angel Turpin MD        gentamicin (Garamycin) 420 mg in sodium chloride 0.9% 100 mL IVPB  5 mg/kg (Adjusted) Intravenous 60 Min Pre-Op Jose Angel Turpin MD         No current Epic-ordered outpatient medications on file.   [3]   Allergies  Allergen Reactions    Ciprofloxacin OTHER (SEE COMMENTS)     Don't remember

## 2024-12-03 NOTE — CONSULTS
[facility]    PATIENT'S NAME: MARIA ISABEL LUNA   ATTENDING PHYSICIAN: Jose Angel Turpin MD   CONSULTING PHYSICIAN: Alia Dave MD   PATIENT ACCOUNT#:   [id number]    LOCATION:  [facility] PACU [address] [address]  MEDICAL RECORD #:   [id number]       YOB: 1960  ADMISSION DATE:       12/03/2024      CONSULT DATE:  12/03/2024    REPORT OF CONSULTATION      REASON FOR ADMISSION:  Post radical prostatectomy.    HISTORY OF PRESENT ILLNESS:  The patient is a 64-year-old  male who was recently diagnosed with prostate cancer after a prostate biopsy for evaluation of elevated PSA as an outpatient.  Biopsy was positive for adenocarcinoma.  Imaging studies showed no distant metastases.  The patient elected radical prostatectomy approach.  Scheduled today for above-mentioned procedure by Dr. Turpin.  Postoperatively transferred to PACU for further monitoring.    PAST MEDICAL HISTORY:  Recent diagnosis of prostate cancer, enlarged prostate, hypertension, hyperlipidemia, kidney stones, diabetes mellitus type 2, history of prostatitis.    PAST SURGICAL HISTORY:  Appendectomy, hernia repair, cystoscopy and lithotripsy, thoracotomy, and bullae excision.    MEDICATIONS:  Please see medication reconciliation list.     ALLERGIES:  Ciprofloxacin.    FAMILY HISTORY:  Father had coronary artery disease and prostate cancer.    REVIEW OF SYSTEMS:  Currently resting in bed.  Some abdominal discomfort.  No chest pain.  No shortness of breath.  Other 12-point review of systems negative.      PHYSICAL EXAMINATION:    GENERAL:  Alert, oriented to time, place, and person, mild to moderate distress.   VITAL SIGNS:  Temperature 97.3, pulse 94, respiratory rate 21, blood pressure 125/75, pulse ox 95% on 4L nasal cannula oxygen.  HEENT:  Atraumatic.  Oropharynx clear.  Dry mucous membranes.  Ears, nose normal.  Eyes:  Anicteric sclerae.  NECK:  Supple.  No lymphadenopathy.  Trachea midline.  Full range of motion.  LUNGS:   Clear to auscultation bilaterally.  Normal respiratory effort.  HEART:  Regular rate and rhythm.  S1 and S2 auscultated.  No murmur.  ABDOMEN:  Soft, nondistended.  Laparoscopic incisions.  Hypoactive bowel sounds.  EXTREMITIES:  No peripheral edema, clubbing, or cyanosis.  NEUROLOGIC:  Motor and sensory intact.  Cranial nerves II-XII are intact.    ASSESSMENT AND PLAN:    1.   Prostate cancer status post robot-assisted laparoscopic radical prostatectomy, bilateral pelvic lymph node dissection, laparoscopic lysis of adhesions, primary umbilical hernia repair.  Pain control.  Monitor hemodynamic status.  DVT prophylaxis.  Full pathology report still pending.  2.   Essential hypertension.  Continue home medications and monitor.  3.   Diabetes mellitus type 2.  Continue home medications.  Monitor Accu-Cheks.  Sliding scale insulin.  4.   Hyperlipidemia.  Continue home medications.    Dictated By Alia Dave MD  d: 12/03/2024 14:53:56  t: 12/03/2024 15:28:45  Job [id number]  FB/

## 2024-12-03 NOTE — H&P
History & Physical Examination    Patient Name: Mario Loera  MRN: [id number]  [facility]: [id number]  YOB: 1960    Diagnosis: prostate cancer    Present Illness: Patient is a 64-year-old male who was diagnosed with clinically localized favorable intermediate risk prostate cancer upon prostate biopsy performed 6/13/2024 for evaluation of elevated screening PSA levels and an abnormal prostate MRI.  Prostate MRI showed organ confined disease.  Management options were discussed with the patient who elected to proceed with a robotic radical prostatectomy and pelvic lymph node dissection for initial definitive local therapy.  We discussed rationale, approach, benefits, risks, possible complications, and reasonable alternatives of treatment.  We discussed the expected postoperative course of recovery.  We discussed surgical risks including not limited to medical and anesthetic complications, bleeding, infection, damage to surrounding organs or structures, possible need for additional procedures.  We discussed the risks of infertility, urinary incontinence and erectile dysfunction.  Patient verbalized understanding and wishes to proceed.  He was preoperatively medically optimized for the surgery.    He denies any new complaints.    Allergies: Allergies[1]    Past Medical History:    Calculus of kidney    High blood pressure    High cholesterol    Kidney stones    Other and unspecified hyperlipidemia    Prostatitis    Type II or unspecified type diabetes mellitus without mention of complication, not stated as uncontrolled    Unspecified essential hypertension     Past Surgical History:   Procedure Laterality Date    Appendectomy      Colonoscopy      Colonoscopy N/A 12/30/2019    Procedure: COLONOSCOPY;  Surgeon: Simon Alvarez MD;  Location: [facility] ENDOSCOPY    Hernia surgery      Thoracotomy,excis bullae      as infant for empyema     Family History   Problem Relation Age of Onset    Heart Disease Father 69         had 4 way and 3 way bypass.    Cancer Father         Prostate    Heart Disease Paternal Aunt     Heart Disease Mother     Cancer Other         Prostate    Cancer Paternal Grandfather         prostate cancer     Social History     Tobacco Use    Smoking status: Never     Passive exposure: Never    Smokeless tobacco: Never   Substance Use Topics    Alcohol use: No       SYSTEM Check if Review is Normal Check if Physical Exam is Normal If not normal, please explain:   HEENT [X] [X]    NECK & BACK [X] [X]    HEART [X] [X]    LUNGS [X] [X]    ABDOMEN [X] [UMBILICAL HERNIA, REDUCIBLE]    UROGENITAL [X] [X]    EXTREMITIES [X] [X]    OTHER        [ x ] I have discussed the risks and benefits and alternatives with the patient/family.  They understand and agree to proceed with plan of care.  [ x ] I have reviewed the History and Physical done within the last 30 days.  Any changes noted above.    Jose Angel Turpin MD  12/3/2024       [1]   Allergies  Allergen Reactions    Ciprofloxacin OTHER (SEE COMMENTS)     Don't remember

## 2024-12-04 VITALS
BODY MASS INDEX: 29.96 KG/M2 | SYSTOLIC BLOOD PRESSURE: 131 MMHG | WEIGHT: 214 LBS | RESPIRATION RATE: 18 BRPM | HEART RATE: 79 BPM | DIASTOLIC BLOOD PRESSURE: 73 MMHG | TEMPERATURE: 98 F | OXYGEN SATURATION: 97 % | HEIGHT: 71 IN

## 2024-12-04 LAB
ANION GAP SERPL CALC-SCNC: 7 MMOL/L (ref 0–18)
BASOPHILS # BLD AUTO: 0.01 X10(3) UL (ref 0–0.2)
BASOPHILS NFR BLD AUTO: 0.1 %
BUN BLD-MCNC: 16 MG/DL (ref 9–23)
BUN/CREAT SERPL: 13.8 (ref 10–20)
CALCIUM BLD-MCNC: 8.7 MG/DL (ref 8.7–10.4)
CHLORIDE SERPL-SCNC: 114 MMOL/L (ref 98–112)
CO2 SERPL-SCNC: 23 MMOL/L (ref 21–32)
CREAT BLD-MCNC: 1.16 MG/DL
DEPRECATED RDW RBC AUTO: 41.6 FL (ref 35.1–46.3)
EGFRCR SERPLBLD CKD-EPI 2021: 70 ML/MIN/1.73M2 (ref 60–?)
EOSINOPHIL # BLD AUTO: 0 X10(3) UL (ref 0–0.7)
EOSINOPHIL NFR BLD AUTO: 0 %
ERYTHROCYTE [DISTWIDTH] IN BLOOD BY AUTOMATED COUNT: 13.2 % (ref 11–15)
GLUCOSE BLD-MCNC: 136 MG/DL (ref 70–99)
GLUCOSE BLDC GLUCOMTR-MCNC: 130 MG/DL (ref 70–99)
GLUCOSE BLDC GLUCOMTR-MCNC: 141 MG/DL (ref 70–99)
HCT VFR BLD AUTO: 34.9 %
HGB BLD-MCNC: 12.2 G/DL
IMM GRANULOCYTES # BLD AUTO: 0.05 X10(3) UL (ref 0–1)
IMM GRANULOCYTES NFR BLD: 0.4 %
LYMPHOCYTES # BLD AUTO: 0.99 X10(3) UL (ref 1–4)
LYMPHOCYTES NFR BLD AUTO: 7.2 %
MCH RBC QN AUTO: 30.6 PG (ref 26–34)
MCHC RBC AUTO-ENTMCNC: 35 G/DL (ref 31–37)
MCV RBC AUTO: 87.5 FL
MONOCYTES # BLD AUTO: 1.15 X10(3) UL (ref 0.1–1)
MONOCYTES NFR BLD AUTO: 8.4 %
NEUTROPHILS # BLD AUTO: 11.53 X10 (3) UL (ref 1.5–7.7)
NEUTROPHILS # BLD AUTO: 11.53 X10(3) UL (ref 1.5–7.7)
NEUTROPHILS NFR BLD AUTO: 83.9 %
OSMOLALITY SERPL CALC.SUM OF ELEC: 301 MOSM/KG (ref 275–295)
PLATELET # BLD AUTO: 209 10(3)UL (ref 150–450)
POTASSIUM SERPL-SCNC: 4.3 MMOL/L (ref 3.5–5.1)
RBC # BLD AUTO: 3.99 X10(6)UL
SODIUM SERPL-SCNC: 144 MMOL/L (ref 136–145)
WBC # BLD AUTO: 13.7 X10(3) UL (ref 4–11)

## 2024-12-04 RX ORDER — POLYETHYLENE GLYCOL 3350 17 G/17G
17 POWDER, FOR SOLUTION ORAL DAILY PRN
Status: SHIPPED | COMMUNITY
Start: 2024-12-04

## 2024-12-04 RX ORDER — HYDROCODONE BITARTRATE AND ACETAMINOPHEN 5; 325 MG/1; MG/1
1 TABLET ORAL EVERY 6 HOURS PRN
Qty: 30 TABLET | Refills: 0 | Status: SHIPPED | OUTPATIENT
Start: 2024-12-04

## 2024-12-04 NOTE — PLAN OF CARE
Patient advanced to carb controlled diet, tolerating well, denies nausea or bloating. IV fluids decreased due to slight increase in swelling to fingers. Patient complains of incisional pain, pain controlled with scheduled Tylenol and Oxycodone as needed. Encouraged frequent ambulation, patient up walking in halls this morning. Denies passing gas, no BM. Chicas draining well with good output, urine color is green tinged from dry used in surgery. Incision is CDI and open to air. Chicas and leg bag education provided at home to patient and patient's girlfriend. IV removed, discharge instructions and follow up information given to patient.    Problem: Patient Centered Care  Goal: Patient preferences are identified and integrated in the patient's plan of care  Description: Interventions:  - What would you like us to know as we care for you? Girlfriend is a nurse and he works as a cook at [employer]  - Provide timely, complete, and accurate information to patient/family  - Incorporate patient and family knowledge, values, beliefs, and cultural backgrounds into the planning and delivery of care  - Encourage patient/family to participate in care and decision-making at the level they choose  - Honor patient and family perspectives and choices  Outcome: Adequate for Discharge     Problem: Patient/Family Goals  Goal: Patient/Family Long Term Goal  Description: Patient's Long Term Goal: return to work    Interventions:  - reviewed lifting and activity restrictions  - See additional Care Plan goals for specific interventions  Outcome: Adequate for Discharge  Goal: Patient/Family Short Term Goal  Description: Patient's Short Term Goal: manage pain    Interventions:   - Oxycodone as needed and scheduled Tylenol for pain  - See additional Care Plan goals for specific interventions  Outcome: Adequate for Discharge     Problem: PAIN - ADULT  Goal: Verbalizes/displays adequate comfort level or patient's stated pain goal  Description:  INTERVENTIONS:  - Encourage pt to monitor pain and request assistance  - Assess pain using appropriate pain scale  - Administer analgesics based on type and severity of pain and evaluate response  - Implement non-pharmacological measures as appropriate and evaluate response  - Consider cultural and social influences on pain and pain management  - Manage/alleviate anxiety  - Utilize distraction and/or relaxation techniques  - Monitor for opioid side effects  - Notify MD/LIP if interventions unsuccessful or patient reports new pain  - Anticipate increased pain with activity and pre-medicate as appropriate  Outcome: Adequate for Discharge     Problem: RISK FOR INFECTION - ADULT  Goal: Absence of fever/infection during anticipated neutropenic period  Description: INTERVENTIONS  - Monitor WBC  - Administer growth factors as ordered  - Implement neutropenic guidelines  Outcome: Adequate for Discharge     Problem: SAFETY ADULT - FALL  Goal: Free from fall injury  Description: INTERVENTIONS:  - Assess pt frequently for physical needs  - Identify cognitive and physical deficits and behaviors that affect risk of falls.  - [place] fall precautions as indicated by assessment.  - Educate pt/family on patient safety including physical limitations  - Instruct pt to call for assistance with activity based on assessment  - Modify environment to reduce risk of injury  - Provide assistive devices as appropriate  - Consider OT/PT consult to assist with strengthening/mobility  - Encourage toileting schedule  Outcome: Adequate for Discharge     Problem: DISCHARGE PLANNING  Goal: Discharge to home or other facility with appropriate resources  Description: INTERVENTIONS:  - Identify barriers to discharge w/pt and caregiver  - Include patient/family/discharge partner in discharge planning  - Arrange for needed discharge resources and transportation as appropriate  - Identify discharge learning needs (meds, wound care, etc)  - Arrange for  interpreters to assist at discharge as needed  - Consider post-discharge preferences of patient/family/discharge partner  - Complete POLST form as appropriate  - Assess patient's ability to be responsible for managing their own health  - Refer to Case Management Department for coordinating discharge planning if the patient needs post-hospital services based on physician/LIP order or complex needs related to functional status, cognitive ability or social support system  Outcome: Adequate for Discharge     Problem: GENITOURINARY - ADULT  Goal: Absence of urinary retention  Description: INTERVENTIONS:  - Assess patient’s ability to void and empty bladder  - Monitor intake/output and perform bladder scan as needed  - Follow urinary retention protocol/standard of care  - Consider collaborating with pharmacy to review patient's medication profile  - Implement strategies to promote bladder emptying  Outcome: Adequate for Discharge

## 2024-12-04 NOTE — DISCHARGE INSTRUCTIONS
Home Care Instructions  Robotic Radical Prostatectomy  Dr. JORDI Turpin      Pain Control  You will be provided with a prescription for Norco (hydrocodone/acetaminophen). You may take 1-2 tablets every 6-8 hours as needed for pain. This is a narcotic medication. You should take this medication with food. Do not drive or operate machinery while taking this medication. This medication can cause constipation so you should be taking a stool softener to avoid constipation. The stool softener (Senokot-S) was prescribed to your pharmacy and is also available over the counter. Keep in mind that Norco also contains acetaminophen, you do not want to exceed 4 grams of acetaminophen in 24 hours.    Diet  Diet as tolerated. Eat small, frequent meals rather than larger, heavier meals for the first 1-2 weeks after surgery. You may want to avoid greasy, fatty, and spicy foods for a couple days. You will probably feel bloated and gassy for the next couple days and these types of foods will likely add to that. Drink plenty of fluids. You should start passing gas within 2-3 days after surgery. You should have a bowel movement within 3-5 days after surgery.      Wound Care  You may shower starting 2 days after your surgery. Let the water run over your incisions, do not scrub at them, and pat dry after your shower. No tub baths or pool water. You most likely had a Mohan Sanchez (DANNA) drain which was removed from your abdomen before leaving the hospital. You may experience some drainage from this incision for 1-2 days. You should apply a gauze over this site to catch the drainage and change as needed.      Activity  No lifting greater than 10 lbs (a gallon of milk is 8 lbs) for 4 weeks. No strenuous activity or exercise for 4 weeks as well. This involves working out, vacuum cleaning, mowing the lawn, walking the dog, and scrubbing floors. You may climb stairs as needed but do not continuously run up and down the stairs. When you do need to  go up or down, take them slow. You should be up and walking around. Do not sit for extended periods of time. You may ride and be a passenger in a car, however you may not drive while you have a epps catheter.    Notify your physician if:  You develop a temperature greater than 101 F or persistently greater than 100.5 F.  Persistent nausea and/or vomiting, persistent diarrhea.  Increased pain not relieved by Norco.  Foul smelling drainage and/or redness to your incisions.  If you have chest pain, calf pain/swelling, or shortness of breath please go to the emergency room.    Below are your epps catheter instructions.  You will follow up in 7-10 days for your epps removal.          Home Care Instructions For Epps Catheter Care (Office Removal)     We hope you were pleased with your care at [facility]. We wish you the best outcome and overall experience with your operation. These instructions will help to minimize pain, prevent an infection, and improve the likelihood of a successful result.    CARE OF YOUR URINE CATHETER ( ALSO CALLED A EPPS CATHETER)  You have been discharged with an indwelling urinary catheter ( Epps catheter). A catheter is a thin, flexible tube. An indwelling urinary catheter has two parts. The first part is a tube that drains urine from your bladder. The second part is a bag or other device that collects the urine.   The most important thing to remember is that you want to prevent infection. Always wash your hands before handling your catheter bag or tubing.   Home Care Basics:  Empty the urine bag when it is full  Do not pull on the catheter or remove it.  Removal by you can damage your urethra.  Draining The Bedside Bag   Wash your hands with soap and clean, running water or an alcohol-based hand  that contains at least 60% alcohol.   Hold the drainage tube over a toilet or measuring container.   Unclamp the tube and let the bag drain.   Don’t touch the tip of the drainage  tube or let it touch the toilet or container, if it does please scrub the tip with alcohol.  Emptying a Leg Bag   Wash your hands.   Remove the stopper on the bag.   Drain the bag into the toilet or a measuring container. Don’t let the tip of the drainage tube touch anything, including your fingers.   Clean the tip of the drainage tube with alcohol.   Replace the stopper.  Cleaning The Drainage Tube   When the bag is empty, clean the tip of the drainage tube with an alcohol wipe.   Clamp the tube.   Reinsert the tube into the pocket on the drainage bag.  Cleaning Your Skin and Tubing   Clean the skin near the catheter with soap and water.   Wash your genital area.   Wash the catheter tubing. Always wash the catheter in the direction away from your body.   You will be told when and how to change your bag and tubing.   Don’t try to remove the catheter by yourself.   You may shower with the catheter in place.

## 2024-12-04 NOTE — PROGRESS NOTES
Pt arrived to unit. Pt is A&Ox 4, room air, tolerating CLD - ACHS, voiding per epps catheter, tolerating pain with PRN medications, ambulating standby assist. Call light within reach, calls appropriately, I-bed awareness/alarm on.

## 2024-12-04 NOTE — PLAN OF CARE
Problem: Patient Centered Care  Goal: Patient preferences are identified and integrated in the patient's plan of care  Description: Interventions:  - Provide timely, complete, and accurate information to patient/family  - Incorporate patient and family knowledge, values, beliefs, and cultural backgrounds into the planning and delivery of care  - Encourage patient/family to participate in care and decision-making at the level they choose  - Honor patient and family perspectives and choices  Outcome: Progressing    No acute changes overnight.  Patient aoX4 with no complaints of pain; scheduled ofirmev given. VSS on room air, tele monitoring (no calls), IVF infusing, accucheck ACHS, epps catheter still with green urine after surgery due to contrast dye. Abdominal lap sites CDI. Dr. Basilio was notified regarding bilateral hand swelling; no redness noted. Per MD, this is likely due to patient's position during surgery yesterday.   Patient did ambulate with standby assist during this shift and tolerated well. Safety precautions maintained; call light within reach.

## 2024-12-04 NOTE — PROGRESS NOTES
[facility]  part of [facility]    Urology Progress Note    Mario Loera Patient Status:  Outpatient in a Bed    1960 MRN [id number]   Location [facility] 4W/SW/SE Attending Jose Angel Turpin MD   Hosp Day # 0 PCP Jossue Lui DO        Subjective:   Pt is resting comfortably in bed. Afebrile, VSS. States pain is tolerable. Bilateral hand swelling noted. No associated pain or erythema. Patient states it has decreased slightly overnight. Epps is draining light green with good UOP. Has not passed flatus. Patient was up ambulating in the halls last night. No c/o chest pain or difficulty breathing.    Cr 1.16   WBC 13.7 (likely reactive)  Hgb 12.2      Objective:     24hr Min/Max:  Temp  Min: 97.3 °F (36.3 °C)  Max: 98 °F (36.7 °C)  Pulse  Min: 83  Max: 100  BP  Min: 103/65  Max: 137/85  Resp  Min: 18  Max: 24  SpO2  Min: 93 %  Max: 99 %    Most Recent :    Vitals:    24 0419   BP: 130/80   Pulse: 83   Resp: 18   Temp: 98 °F (36.7 °C)     [unfilled]  No intake/output data recorded.    GENERAL APPEARANCE: well developed, well nourished, in no acute distress  EYES: sclera non-icteric  ORAL CAVITY: mucosa moist  NECK/THYROID: no obvious masses or goiter  CV/RESP: non-labored breathing  ABDOMEN: soft, appropriately TTP, incisions c/d with edges well approximated  : epps draining light green urine  EXTREMITIES: warm, well perfused, no clubbing, cyanosis or edema          Results:     Lab Results   Component Value Date    WBC 13.7 (H) 2024    HGB 12.2 (L) 2024    HCT 34.9 (L) 2024    .0 2024    CREATSERUM 1.16 2024    BUN 16 2024     2024    K 4.3 2024     (H) 2024    CO2 23.0 2024     (H) 2024    CA 8.7 2024    ALB 4.5 2024    ALKPHO 49 2024    BILT 0.6 2024    TP 7.1 2024    AST 21 2024    ALT 25 2024    PTT 27.5 2024    INR 0.86  11/23/2024    TSH 1.228 02/03/2024    PSA 2.74 02/03/2024    B12 369 02/03/2024             Assessment and Plan:     Pt is a 64 year old male who is POD 1 status-post robot-assisted laparoscopic radical prostatectomy, bilateral pelvic lymph node dissection, laparoscopic lysis of adhesions, and primary repair of recurrent umbilical hernia with Dr Turpin on 12/03/2024.    - Advance diet to general  - Decrease IV fluids to 75 mL/hr. Saline lock when tolerating diet  - Do not remove epps catheter. Patient to be discharged with  - Okay for discharge once tolerating diet and pain is managed with oral pain medications      All questions answered. Patient verbalizes understanding and agrees with plan.    VIKTORIYA Hollis  [facility] Urology  12/4/2024

## 2024-12-06 ENCOUNTER — TELEPHONE (OUTPATIENT)
Dept: SURGERY | Facility: CLINIC | Age: 64
End: 2024-12-06

## 2024-12-06 NOTE — TELEPHONE ENCOUNTER
Per patient wants to know what post-operation restrictions are and when he'll be able to drive. Please advise.

## 2024-12-06 NOTE — TELEPHONE ENCOUNTER
I s/w pt and determined that pt was concerned about the tip of the penis and the epps cath coming out there. I asked if he was uncircumcised and he stated yes and I explained that when the foreskin if down the head of the penis is not seen only the cath tube. I told pt that there will be some drainage of blood mixed with serous fluid which is expected because the cath is an irritant. Pt was thankful for the call.

## 2024-12-09 NOTE — PROGRESS NOTES
[facility] Urologic Oncology  Follow-Up Visit    HPI:   Mario Loera is a 64 year old male here today for post-operative follow-up. The patient underwent a robotic-assisted laparoscopic radical prostatectomy, bilateral pelvic lymph node dissection, laparoscopic lysis of adhesions, and primary repair of recurrent umbilical hernia with Dr Turpin on 12/03/2024.    The patient states he is doing well. Pain is tolerable, has decrease to one pain pill per day. Chicas draining clear yellow urine. His bowel movements are daily and soft. He has been following activity restrictions. No c/o fever/chills or other s/s of infection.    Final pathology is bilateral acinar prostatic adenocarcinoma, [place] 3+4 = 7 pattern, grade group 2, predominantly involving the right and left apex regions. Pathologic TNM: pT2(m), N0.     1.  Clinically localized favorable intermediate risk prostate cancer (cT1c cN0 Mx)  Family history of prostate cancer in his father and paternal uncle.    Has been on finasteride for many years for BPH.  PSA from February 2024 elevated at 5.48 ng/mL when corrected for finasteride use.    Seen by Dr. Earl.  Prostate MRI revealing a prostate volume of 33 mL.  PI-RADS 3 lesion in the left peripheral zone apex.  No lymphadenopathy.    he underwent a UroNav MRI-US fusion prostate biopsy on 6/13/2024 which revealed grade group 2 ([place] 3+4 =7) prostate cancer in 1/3 cores from the WERO, with 3% involvement.  12 standard cores negative for malignancy.    He was counseled on his options and presents for an opinion regarding surgery.    He denies any significant baseline LUTS.  His IPSS is 4 with a quality-of-life score of 2.    He denies any significant erectile dysfunction.    No gross hematuria or dysuria.  He has history of nephrolithiasis for which he underwent ureteroscopy with laser lithotripsy in 2022.  Currently denies any flank pain.    12/03/2024: robotic-assisted laparoscopic  radical prostatectomy, bilateral pelvic lymph node dissection, laparoscopic lysis of adhesions, and primary repair of recurrent umbilical hernia with Dr Turpin    12/11/2024 F/U: Patient is doing well. Chicas removed. Will obtain PSA in 6 weeks.      PAST MEDICAL HISTORY: Hypertension.  Hyperlipidemia.  DM.  Nephrolithiasis.  Prostate cancer 6/2024..    PAST SURGICAL HISTORY: Laparoscopic appendectomy and umbilical hernia repair.  Thoracotomy as an infant.    SOCIAL HISTORY: Single.  Has 1 son.  No smoking or illicit drug use.  Rare social alcohol.  Works as a [employer] at [employer].     Family History   Problem Relation Age of Onset    Heart Disease Father 69        had [address] and 3 way bypass.    Cancer Father         Prostate    Heart Disease Paternal Aunt     Heart Disease Mother     Cancer Other         Prostate    Cancer Paternal Grandfather         prostate cancer     Allergies: Ciprofloxacin      REVIEW OF SYSTEMS:  Pertinent positives and negatives per HPI. A 12-point ROS was performed and is otherwise negative.       EXAM:  There were no vitals taken for this visit.    Physical Exam  Constitutional:       Appearance: He is well-developed.   HENT:      Head: Normocephalic.   Eyes:      General: No scleral icterus.  Cardiovascular:      Rate and Rhythm: Normal rate.   Pulmonary:      Effort: Pulmonary effort is normal.   Abdominal:      General: Abdomen is flat.      Palpations: Abdomen is soft.      Hernia: Hernia: umbilical, reducible..      Comments: Incisions are C/D with edges well-approximated. No swelling, erythema, or purulent drainage.    Genitourinary:     Penis: Circumcised.       Testes: Normal.      Comments: Bruising noted to genitals. No swelling or tenderness  Skin:     General: Skin is warm and dry.   Neurological:      Mental Status: He is alert and oriented to person, place, and time.   Psychiatric:         Mood and Affect: Mood normal.         Behavior: Behavior normal.        LABS:     PSA Screen   Latest Ref Rng <=4.00    5/18/2016 0.8   1/17/2018 0.8   2/27/2019 0.96    2/8/2020 1.08    9/9/2021 1.58    2/10/2022 1.94    10/4/2022 2.44    7/19/2023 3.61    2/3/2024 2.74        PATHOLOGY:    Provider:  Piper Earl MD      Collected:           06/13/2024         Location [place] Score % of Pattern 4  Grade Group   Positive Cores / Total Cores  Tumor Length (mm)  % Tissue Involved       A. Region of interest 3+4 40 2 1/3 1.0 3    B. Right base                C. Right mid                D. Right apex                E. Left base                F. Left mid                G. Left apex                   IMAGING:    MRI Prostate (5/1/24):     1. PI-RADS CLASSIFICATION:  PI-RADS 3 - Intermediate (the presence of clinically significant cancer is equivocal).      2. There is an indeterminate lesion at the left aspect of the prostatic apex. Continued ascertainment of quantitative PSA can be considered with follow-up prostatic MRI in 1 year as part of an active surveillance program.      3. No elevation of PSA density.      4. Negative for discernible intrapelvic lymphadenopathy.       5. No suspicious osseous lesions are identified in the imaged volume.      6. Distal colonic diverticulosis without evidence of MR complication in the imaged volume.      7. Lesser incidental findings as above.         IMPRESSION:  64 year old male with family history of prostate cancer.  Patient diagnosed with clinically localized favorable intermediate risk prostate cancer upon UroNav prostate biopsy performed 6/13/2024 for evaluation of elevated screening PSA levels. Underwent Robotic-assisted laparoscopic radical prostatectomy, bilateral pelvic lymph node dissection, laparoscopic lysis of adhesions, and primary repair of recurrent umbilical hernia with Dr Turpin on 12/03/2024.    Final pathology results discussed with patient and accompanying wife. They were handed a copy of the report. Discussed  the significance of  adverse findings when/if identified on final pathology. Will await post prostatectomy PSA levels in 6 weeks to guide need for further treatment.     Discussed prostate cancer surveillance following definitive local treatment per NCCN guidelines.     Chicas catheter removed after deflating balloon. We discussed the expected postoperative course of recovery of sexual function and urinary continence.     Patient instructed on the importance of performing Kegel exercises in the recovery of urinary control.  A printed handout was provided with instructions on performing pelvic floor strengthening exercises.     All questions answered.       PLAN:  1.  Kegel exercises  2. Obtain PSA in 6 weeks  3. Follow-up after PSA     Eulalia Allison, VIKTORIYA  12/11/2024

## 2024-12-09 NOTE — TELEPHONE ENCOUNTER
Dr. Turpin    Please sign off on form if you agree to: disability for surgery     -Signature page will be the first page scanned  -From your Inbasket, Highlight the patient and click Chart   -Double click the 11/29/24 Forms Completion telephone encounter  -Scroll down to the Media section   -Click the blue Hyperlink: disability Dr. Turpin 12/9/24  -Click Acknowledge located in the top right ribbon/menu   -Drag the mouse into the blank space of the document and a + sign will appear. Left click to   electronically sign the document.  -Once signed, simply exit out of the screen and you signature will be saved.     Thank you,  Teresa

## 2024-12-10 ENCOUNTER — TELEPHONE (OUTPATIENT)
Dept: SURGERY | Facility: CLINIC | Age: 64
End: 2024-12-10

## 2024-12-11 ENCOUNTER — OFFICE VISIT (OUTPATIENT)
Dept: SURGERY | Facility: CLINIC | Age: 64
End: 2024-12-11

## 2024-12-11 DIAGNOSIS — C61 PROSTATE CANCER (HCC): Primary | ICD-10-CM

## 2024-12-11 PROCEDURE — 99024 POSTOP FOLLOW-UP VISIT: CPT

## 2024-12-12 ENCOUNTER — TELEPHONE (OUTPATIENT)
Dept: FAMILY MEDICINE CLINIC | Facility: CLINIC | Age: 64
End: 2024-12-12

## 2024-12-16 RX ORDER — BLOOD-GLUCOSE METER
1 KIT MISCELLANEOUS DAILY
Qty: 100 STRIP | Refills: 3 | Status: SHIPPED | OUTPATIENT
Start: 2024-12-16

## 2024-12-16 NOTE — TELEPHONE ENCOUNTER
REFILL PASSED PER [facility] PROTOCOLS    Requested Prescriptions   Pending Prescriptions Disp Refills    FREESTYLE LITE TEST In Vitro Strip [Pharmacy Med Name: FREESTYLE LITE STRIPS 100'S] 100 strip 3     Sig: USE 1 STRIP TO CHECK BLOOD SUGAR DAILY       Diabetic Supplies Protocol Passed - 12/16/2024  9:06 AM        Passed - In person appointment or virtual visit in the past 12 mos or appointment in next 3 mos     Recent Outpatient Visits              5 days ago Prostate cancer (HCC)    [facility], BrodheadEulalia Mejia APRN    Office Visit    1 month ago Pre-operative general physical examination    [facility], MerlinLexi Gutierrez APRN    Office Visit    5 months ago Prostate cancer ([facility])    [facility], Jose Angel Aquino MD    Office Visit    6 months ago Prostate cancer (HCC)    [facility], Piper Haywood MD    Office Visit    7 months ago Elevated PSA    [facility], Piper Haywood MD    Office Visit                             Recent Outpatient Visits              5 days ago Prostate cancer (HCC)    [facility], BrodheadEulalia Henderson APRN    Office Visit    1 month ago Pre-operative general physical examination    [facility], Lexi Simmons APRN    Office Visit    5 months ago Prostate cancer (HCC)    [facility], Jose Angel Aquino MD    Office Visit    6 months ago Prostate cancer ([facility])    [facility], Piper Haywood MD    Office Visit    7 months ago Elevated PSA    [facility], Piper Haywood MD    Office Visit

## 2024-12-16 NOTE — TELEPHONE ENCOUNTER
Patient is requesting refill      [facility] - [place], [place] [address] [phone], [phone]

## 2024-12-17 ENCOUNTER — TELEPHONE (OUTPATIENT)
Dept: SURGERY | Facility: CLINIC | Age: 64
End: 2024-12-17

## 2024-12-17 NOTE — TELEPHONE ENCOUNTER
Received some forms through fax for disability/FMLA. I gave them to VIKTORIYA Martell to review and fill, when done I will fax them again to their office at [phone] I sent over lab results for latest lab work done, along with recent office visit with VIKTORIYA Esteban I received a confirmation and will send forms to scanning.

## 2024-12-17 NOTE — TELEPHONE ENCOUNTER
Patient called back. Patient states Family Medical Leave Act/disability are for surgery he had. Start date 12/03/24-4wks.

## 2024-12-19 NOTE — TELEPHONE ENCOUNTER
Dr. Turpin    Please sign off on form if you agree to:disability     -Signature page will be the first page scanned  -From your Inbasket, Highlight the patient and click Chart   -Double click the 11/29/24 Forms Completion telephone encounter  -Scroll down to the Media section   -Click the blue Hyperlink: disability Dr. Turpin 12/19/24  -Click Acknowledge located in the top right ribbon/menu   -Drag the mouse into the blank space of the document and a + sign will appear. Left click to   electronically sign the document.  -Once signed, simply exit out of the screen and you signature will be saved.     Thank you,  Teresa

## 2024-12-20 ENCOUNTER — TELEPHONE (OUTPATIENT)
Facility: CLINIC | Age: 64
End: 2024-12-20

## 2024-12-20 NOTE — TELEPHONE ENCOUNTER
Patient outreach message received:    colonoscopy recall for 5 years.     Recall reminder letter sent out to patient via [employer].

## 2024-12-26 ENCOUNTER — TELEPHONE (OUTPATIENT)
Dept: SURGERY | Facility: CLINIC | Age: 64
End: 2024-12-26

## 2024-12-26 NOTE — TELEPHONE ENCOUNTER
Additional  forms from [employer] were received via fax. Forms were scanned to forms dept and originals were sent to [employer]. Fax # is [phone] .

## 2024-12-27 ENCOUNTER — TELEPHONE (OUTPATIENT)
Dept: SURGERY | Facility: CLINIC | Age: 64
End: 2024-12-27

## 2024-12-27 NOTE — TELEPHONE ENCOUNTER
Please contact patient at [phone] he has some questions and concerns. Did not state what it's regarding. Just wants a nurse to call him back to address them. Thanks

## 2024-12-30 NOTE — TELEPHONE ENCOUNTER
Please advice. Can I create a Note for work on Mr. Loera he states he wants to return to work either  or . And how much is he able to lift. Thanks.          I called and spoke with patient, I introduced myself and where I was calling from, verified his name and . I asked how could I help him answer his questions.  He asked me if he could get a Dr. CHRISTIANSEN note for back to work and what would be his weight lifting restrictions if any. I told hm I would ask geri or Dr. CHRISTIANSEN for that.   He asked me if he needed to fast for upcoming PSA Test. I stated that no he doesn't need to fast, but to drink water, no intercourse, and no bike riding at least 3 days prior to lab work.  Patient asked if he could resume on aspirin pills, I told him that based on his discharged paperwork I stated the following,       Mr. Loera agreed and he understood. Lastly I told him that he would receive the note for work through [employer]. He agreed and call ended.

## 2024-12-30 NOTE — TELEPHONE ENCOUNTER
He can go back to work on 01/06 if he prefers. Starting on 01/01/25, he can gradually start increasing how much he is lifting, and obviously stop if he feels a strain in his abdomen. I would put 20-25 lbs on the note until 02/03/2025 and then no restrictions.

## 2024-12-30 NOTE — TELEPHONE ENCOUNTER
Noted, thank you. Patient was called and informed of note for work, and date of return with restrictions.

## 2025-01-09 ENCOUNTER — TELEPHONE (OUTPATIENT)
Dept: SURGERY | Facility: CLINIC | Age: 65
End: 2025-01-09

## 2025-01-18 ENCOUNTER — LAB ENCOUNTER (OUTPATIENT)
Dept: LAB | Age: 65
End: 2025-01-18
Payer: COMMERCIAL

## 2025-01-18 DIAGNOSIS — C61 PROSTATE CANCER (HCC): ICD-10-CM

## 2025-01-18 LAB — PSA SERPL-MCNC: <0.04 NG/ML (ref ?–4)

## 2025-01-18 PROCEDURE — 84153 ASSAY OF PSA TOTAL: CPT

## 2025-01-18 PROCEDURE — 36415 COLL VENOUS BLD VENIPUNCTURE: CPT

## 2025-01-21 ENCOUNTER — TELEPHONE (OUTPATIENT)
Dept: SURGERY | Facility: CLINIC | Age: 65
End: 2025-01-21

## 2025-01-21 RX ORDER — FINASTERIDE 5 MG/1
5 TABLET, FILM COATED ORAL DAILY
Qty: 90 TABLET | Refills: 3 | OUTPATIENT
Start: 2025-01-21

## 2025-01-21 RX ORDER — ROSUVASTATIN CALCIUM 40 MG/1
40 TABLET, COATED ORAL DAILY
Qty: 90 TABLET | Refills: 3 | Status: SHIPPED | OUTPATIENT
Start: 2025-01-21

## 2025-01-21 RX ORDER — LANCETS 28 GAUGE
1 EACH MISCELLANEOUS DAILY
Qty: 100 EACH | Refills: 3 | Status: SHIPPED | OUTPATIENT
Start: 2025-01-21

## 2025-01-21 RX ORDER — AMLODIPINE BESYLATE 5 MG/1
5 TABLET ORAL DAILY
Qty: 90 TABLET | Refills: 3 | Status: SHIPPED | OUTPATIENT
Start: 2025-01-21

## 2025-01-21 RX ORDER — LISINOPRIL 40 MG/1
40 TABLET ORAL DAILY
Qty: 90 TABLET | Refills: 3 | Status: SHIPPED | OUTPATIENT
Start: 2025-01-21

## 2025-01-21 NOTE — TELEPHONE ENCOUNTER
Transferring remaining refills to alternative pharmacy per pt's request.    Medications: amlodipine 5 mg, lisinopril 40 mg, and rosuvastatin 40 mg    All 3 prescriptions were sent the same day, for the same qty/refill and to same pharmacy:  Original date written: 11/8/2024  Quantity: #90  Additional Refills: 3  Prescribing provider: SOHAN Mccarty  Pharmacy: [place], [place] (#[id number])    Has patient filled from original prescriptions?  [] YES      [x] NO    Patient has not filled on these 3 prescriptions yet, full prescriptions getting sent.  Remaining:  Quantity: #90  Additional Refills: 3  Request Pharmacy: [facility]    Requested Prescriptions     Pending Prescriptions Disp Refills    LISINOPRIL 40 MG Oral Tab [Pharmacy Med Name: LISINOPRIL TABS 40MG] 90 tablet 3     Sig: TAKE 1 TABLET DAILY    AMLODIPINE 5 MG Oral Tab [Pharmacy Med Name: AMLODIPINE BESYLATE TABS 5MG] 90 tablet 3     Sig: TAKE 1 TABLET DAILY     ROSUVASTATIN 40 MG Oral Tab [Pharmacy Med Name: ROSUVASTATIN TABS 40MG] 90 tablet 3     Sig: TAKE 1 TABLET AT BEDTIME

## 2025-01-21 NOTE — TELEPHONE ENCOUNTER
I called and spoke with Mr. Loera verified his name and , patient was notified that Jasbir sent him a MCM, but I did read it for him and asked if he wanted me to schedule him for a follow up, he stated he will call and schedule when he's free since currently he's at work.     Santhosh MCM: 2025 sent   Mario,  Your PSA is undetectable. Please schedule a follow-up appointment to discuss.  Have a good day,  VIKTORIYA Hollis

## 2025-01-21 NOTE — TELEPHONE ENCOUNTER
Patient asking to discuss blood test results from 1/18. Per patient VIKTORIYA Esteban requested blood test. Please call.

## 2025-01-24 NOTE — PROGRESS NOTES
[facility] Urologic Oncology  Follow-Up Visit    HPI:   Mario Loera is a 64 year old male here today follow-up. The patient was last seen on 12/11/2024.    The patient states he is doing well. Incisions are healing well. No complaints aside from incontinence.    Has been working on Kegel exercises. Uses 5 briefs per day. Has seen some improvement, but not much.     Erections are minimal. He has noticed some increased blood flow at times, but has not achieved an actual erection. He is not currently interested in treatment at this time.     01/18/2024 PSA <0.04 ng/mL    1.  Clinically localized favorable intermediate risk prostate cancer (cT1c cN0 Mx)  Family history of prostate cancer in his father and paternal uncle.    Has been on finasteride for many years for BPH.  PSA from February 2024 elevated at 5.48 ng/mL when corrected for finasteride use.    Seen by Dr. Earl.  Prostate MRI revealing a prostate volume of 33 mL.  PI-RADS 3 lesion in the left peripheral zone apex.  No lymphadenopathy.    he underwent a UroNav MRI-US fusion prostate biopsy on 6/13/2024 which revealed grade group 2 ([place] 3+4 =7) prostate cancer in 1/3 cores from the WERO, with 3% involvement.  12 standard cores negative for malignancy.    He was counseled on his options and presents for an opinion regarding surgery.    He denies any significant baseline LUTS.  His IPSS is 4 with a quality-of-life score of 2.    He denies any significant erectile dysfunction.    No gross hematuria or dysuria.  He has history of nephrolithiasis for which he underwent ureteroscopy with laser lithotripsy in 2022.  Currently denies any flank pain.    12/03/2024: robotic-assisted laparoscopic radical prostatectomy, bilateral pelvic lymph node dissection, laparoscopic lysis of adhesions, and primary repair of recurrent umbilical hernia with Dr Turpin    12/11/2024 F/U: Patient is doing well. Chicas removed. Will obtain PSA in 6  weeks.    01/2025 F/U: PSA undetectable. Incontinence has minimal improvement with Kegels. Referral for PFPT given. Not interested in treatment for ED at this time.  F/U in 6 months      PAST MEDICAL HISTORY: Hypertension.  Hyperlipidemia.  DM.  Nephrolithiasis.  Prostate cancer 6/2024..    PAST SURGICAL HISTORY: Laparoscopic appendectomy and umbilical hernia repair.  Thoracotomy as an infant.    SOCIAL HISTORY: Single.  Has 1 son.  No smoking or illicit drug use.  Rare social alcohol.  Works as a cook at [email or web address].     Family History   Problem Relation Age of Onset    Heart Disease Father 69        had 4 [address] and 3 way bypass.    Cancer Father         Prostate    Heart Disease Paternal Aunt     Heart Disease Mother     Cancer Other         Prostate    Cancer Paternal Grandfather         prostate cancer     Allergies: Ciprofloxacin      REVIEW OF SYSTEMS:  Pertinent positives and negatives per HPI. A 12-point ROS was performed and is otherwise negative.       EXAM:  There were no vitals taken for this visit.    Physical Exam  Constitutional:       Appearance: He is well-developed.   HENT:      Head: Normocephalic.   Eyes:      General: No scleral icterus.  Cardiovascular:      Rate and Rhythm: Normal rate.   Pulmonary:      Effort: Pulmonary effort is normal.   Abdominal:      General: Abdomen is flat.      Palpations: Abdomen is soft.      Comments: Incisions are well-healed with edges well-approximated    Skin:     General: Skin is warm and dry.   Neurological:      Mental Status: He is alert and oriented to person, place, and time.   Psychiatric:         Mood and Affect: Mood normal.         Behavior: Behavior normal.       LABS:     PSA Screen   Latest Ref Rng <=4.00    5/18/2016 0.8   1/17/2018 0.8   2/27/2019 0.96    2/8/2020 1.08    9/9/2021 1.58    2/10/2022 1.94    10/4/2022 2.44    7/19/2023 3.61    2/3/2024 2.74    01/18/2025 <0.04       PATHOLOGY:    Provider:  Piper Earl MD       Collected:           06/13/2024         Location [place] Score % of Pattern 4  Grade Group   Positive Cores / Total Cores  Tumor Length (mm)  % Tissue Involved       A. Region of interest 3+4 40 2 1/3 1.0 3    B. Right base                C. Right mid                D. Right apex                E. Left base                F. Left mid                G. Left apex                   IMAGING:    MRI Prostate (5/1/24):     1. PI-RADS CLASSIFICATION:  PI-RADS 3 - Intermediate (the presence of clinically significant cancer is equivocal).      2. There is an indeterminate lesion at the left aspect of the prostatic apex. Continued ascertainment of quantitative PSA can be considered with follow-up prostatic MRI in 1 year as part of an active surveillance program.      3. No elevation of PSA density.      4. Negative for discernible intrapelvic lymphadenopathy.       5. No suspicious osseous lesions are identified in the imaged volume.      6. Distal colonic diverticulosis without evidence of MR complication in the imaged volume.      7. Lesser incidental findings as above.         IMPRESSION:  64 year old male with family history of prostate cancer.  Patient diagnosed with clinically localized favorable intermediate risk prostate cancer upon UroNav prostate biopsy performed 6/13/2024 for evaluation of elevated screening PSA levels. Underwent Robotic-assisted laparoscopic radical prostatectomy, bilateral pelvic lymph node dissection, laparoscopic lysis of adhesions, and primary repair of recurrent umbilical hernia with Dr Turpin on 12/03/2024.    Discussed prostate cancer surveillance following definitive local treatment per NCCN guidelines.    Patient instructed on the importance of performing Kegel exercises in the recovery of urinary control.  Referral given for PFPT.    Oral phosphodiesterase inhibitor therapy discussed with patient, he is not interested at this time.     All questions answered.       PLAN:  - Continue  Shannan lin  - PFPT referral given  - PSA in 6 months  - Follow-up in 6 months with PSA a few days prior    Eulalia Allison, APRN  1/27/2025

## 2025-01-27 ENCOUNTER — OFFICE VISIT (OUTPATIENT)
Dept: SURGERY | Facility: CLINIC | Age: 65
End: 2025-01-27

## 2025-01-27 DIAGNOSIS — C61 PROSTATE CANCER (HCC): Primary | ICD-10-CM

## 2025-01-27 PROCEDURE — 99213 OFFICE O/P EST LOW 20 MIN: CPT

## 2025-01-29 ENCOUNTER — OFFICE VISIT (OUTPATIENT)
Dept: FAMILY MEDICINE CLINIC | Facility: CLINIC | Age: 65
End: 2025-01-29

## 2025-01-29 VITALS
HEIGHT: 72 IN | TEMPERATURE: 97 F | BODY MASS INDEX: 28.8 KG/M2 | WEIGHT: 212.63 LBS | DIASTOLIC BLOOD PRESSURE: 81 MMHG | SYSTOLIC BLOOD PRESSURE: 135 MMHG | HEART RATE: 91 BPM

## 2025-01-29 DIAGNOSIS — I10 ESSENTIAL HYPERTENSION: ICD-10-CM

## 2025-01-29 DIAGNOSIS — Z90.79 S/P PROSTATECTOMY: ICD-10-CM

## 2025-01-29 DIAGNOSIS — C61 PROSTATE CANCER (HCC): ICD-10-CM

## 2025-01-29 DIAGNOSIS — E11.9 CONTROLLED TYPE 2 DIABETES MELLITUS WITHOUT COMPLICATION, WITHOUT LONG-TERM CURRENT USE OF INSULIN (HCC): Primary | ICD-10-CM

## 2025-01-29 PROCEDURE — 99214 OFFICE O/P EST MOD 30 MIN: CPT | Performed by: NURSE PRACTITIONER

## 2025-01-29 NOTE — ASSESSMENT & PLAN NOTE
Pt is recovering well without complaints  Psa is now undetectable  Has order for pelvic floor treatment  Will repeat psa in 6 months

## 2025-01-29 NOTE — PROGRESS NOTES
HPI  Pt presents for follow up on Prostate cancer status post robot-assisted laparoscopic radical prostatectomy, bilateral pelvic lymph node dissection, laparoscopic lysis of adhesions, primary umbilical hernia repair done on 12/3/2024.   Psa is undetectable    Was seen yesterday for follow up-was given referral for pelvic floor therapy. Has some incontinence issues still.     Is back at work for 2 weeks.     Appetite is good. Moving bowels easily.     Blood sugar running 120s    Review of Systems   Constitutional:  Negative for activity change and appetite change.   Gastrointestinal:  Negative for abdominal distention, abdominal pain, constipation and diarrhea.   Genitourinary:  Negative for decreased urine volume, dysuria, frequency and testicular pain.        + urinary incontinence       Vitals:    01/29/25 0802   BP: 135/81   Pulse: 91   Temp: 97 °F (36.1 °C)   Weight: 212 lb 9.6 oz (96.4 kg)   Height: 6' (1.829 m)     Body mass index is 28.83 kg/m².  Wt Readings from Last 6 Encounters:   01/29/25 212 lb 9.6 oz (96.4 kg)   12/03/24 214 lb (97.1 kg)   11/05/24 213 lb 9.6 oz (96.9 kg)   02/09/24 206 lb (93.4 kg)   07/21/23 195 lb (88.5 kg)   07/15/23 190 lb (86.2 kg)     BP Readings from Last 5 Encounters:   01/29/25 135/81   12/04/24 131/73   11/05/24 140/90   02/09/24 128/71   01/26/24 142/71         Health Maintenance   Topic Date Due    Pneumococcal Vaccine: 50+ Years (3 of 3 - PPSV23, PCV20 or PCV21) 03/30/2024    COVID-19 Vaccine (4 - 2024-25 season) 09/01/2024    Influenza Vaccine (1) 10/01/2024    HTN: BP Follow-Up  12/05/2024    Colorectal Cancer Screening  12/30/2024    Annual Depression Screening  01/01/2025    Fall Risk Screening (Annual)  01/01/2025    Diabetes Care: Foot Exam (Annual)  01/01/2025    Diabetes Care: Microalb/Creat Ratio (Annual)  01/01/2025    Annual Physical  02/09/2025    Diabetes Care A1C  05/09/2025    Diabetes Care: GFR  12/04/2025    Diabetes Care Dilated Eye Exam  01/08/2026     PSA  01/18/2027    Zoster Vaccines  Completed    Meningococcal B Vaccine  Aged Out       Past Medical History:    Calculus of kidney    High blood pressure    High cholesterol    Kidney stones    Other and unspecified hyperlipidemia    Prostatitis    Type II or unspecified type diabetes mellitus without mention of complication, not stated as uncontrolled    Unspecified essential hypertension       .  Past Surgical History:   Procedure Laterality Date    Appendectomy      Colonoscopy      Colonoscopy N/A 12/30/2019    Procedure: COLONOSCOPY;  Surgeon: Simon Alvarez MD;  Location: [facility] ENDOSCOPY    Hernia surgery      Laparoscopy, surgical prostatectomy, retropubic radical, w/nerve sparing  12/03/2024    RALP + PLND. Laparoscopic lysis of adhesions. Primary umbilical hernia repair.    Thoracotomy,excis bullae      as infant for empyema       Family History   Problem Relation Age of Onset    Heart Disease Father 69        had [address] and [address] way bypass.    Cancer Father         Prostate    Heart Disease Paternal Aunt     Heart Disease Mother     Cancer Other         Prostate    Cancer Paternal Grandfather         prostate cancer       Social History     Socioeconomic History    Marital status: Single     Spouse name: Not on file    Number of children: Not on file    Years of education: Not on file    Highest education level: Not on file   Occupational History    Not on file   Tobacco Use    Smoking status: Never     Passive exposure: Never    Smokeless tobacco: Never   Vaping Use    Vaping status: Never Used   Substance and Sexual Activity    Alcohol use: No    Drug use: No    Sexual activity: Not on file   Other Topics Concern    Not on file   Social History Narrative    Not on file     Social Drivers of Health     Financial Resource Strain: Not on file   Food Insecurity: No Food Insecurity (12/3/2024)    Food Insecurity     Food Insecurity: Never true   Transportation Needs: No Transportation Needs (12/3/2024)     Transportation Needs     Lack of Transportation: No     Car Seat: Not on file   Physical Activity: Not on file   Stress: Not on file   Social Connections: Not on file   Housing Stability: Low Risk  (12/3/2024)    Housing Stability     Housing Instability: No     Housing Instability Emergency: Not on file     Crib or Bassinette: Not on file       Current Outpatient Medications   Medication Sig Dispense Refill    FreeStyle Lancets Does not apply Misc 1 Lancet by Finger stick route daily. 100 each 3    LISINOPRIL 40 MG Oral Tab TAKE 1 TABLET DAILY 90 tablet 3    amLODIPine 5 MG Oral Tab Take 1 tablet (5 mg total) by mouth daily. 90 tablet 3    metFORMIN HCl 1000 MG Oral Tab Take 1 tablet (1,000 mg total) by mouth 2 (two) times daily with meals. 180 tablet 0    rosuvastatin 40 MG Oral Tab Take 1 tablet (40 mg total) by mouth daily. 90 tablet 3    Glucose Blood (FREESTYLE LITE TEST) In Vitro Strip 1 strip by In Vitro route daily. 100 strip 3    sennosides 17.2 MG Oral Tab Take 1 tablet (17.2 mg total) by mouth nightly. 30 tablet 0    HYDROcodone-acetaminophen (NORCO) 5-325 MG Oral Tab Take 1 tablet by mouth every 6 (six) hours as needed for Pain. 30 tablet 0    aspirin 81 MG Oral Tab Take 1 tablet (81 mg total) by mouth daily. HOLD until epps removed or as instructed by Dr. Turpin      Ascorbic Acid (VITAMIN C OR) Take by mouth.      Multiple Vitamins-Minerals (MULTIVITAMIN MEN OR) Take by mouth.      polyethylene glycol, PEG 3350, 17 g Oral Powd Pack Take 17 g by mouth daily as needed (constipation). (Patient not taking: Reported on 1/29/2025)         Allergies:  Allergies[1]    Physical Exam  Vitals and nursing note reviewed.   Constitutional:       General: He is not in acute distress.     Appearance: Normal appearance.   Cardiovascular:      Rate and Rhythm: Normal rate and regular rhythm.      Heart sounds: Normal heart sounds.   Pulmonary:      Effort: Pulmonary effort is normal. No respiratory distress.       Breath sounds: Normal breath sounds.   Skin:     General: Skin is warm and dry.   Neurological:      Mental Status: He is alert and oriented to person, place, and time.         Assessment and Plan:   Problem List Items Addressed This Visit       Controlled type 2 diabetes mellitus without complication, without long-term current use of insulin (HCC) - Primary     Continue present management  Follow up 6 months         Essential hypertension     Blood pressure controlled  Continue present management  F/u 6 months         Prostate cancer (HCC)    S/P prostatectomy     Pt is recovering well without complaints  Psa is now undetectable  Has order for pelvic floor treatment  Will repeat psa in 6 months                     Discussed plan of care with pt and pt is in agreement.All questions answered. Pt to call with questions or concerns.      Encouraged to sign up for My Chart if not already registered.     Note to patient and family:  The 21st Century Cures Act makes medical notes available to patients in the interest of transparency.  However, please be advised that this is a medical document.  It is intended as a peer to peer communication.  It is written in medical language and may contain abbreviations or verbiage that are technical and unfamiliar.  It may appear blunt or direct.  Medical documents are intended to carry relevant information, facts as evident, and the clinical opinion of the practitioner.       [1]   Allergies  Allergen Reactions    Ciprofloxacin OTHER (SEE COMMENTS)     Don't remember

## 2025-02-01 RX ORDER — AMLODIPINE BESYLATE 5 MG/1
5 TABLET ORAL DAILY
Qty: 90 TABLET | Refills: 3 | Status: SHIPPED | OUTPATIENT
Start: 2025-02-01

## 2025-02-01 RX ORDER — ROSUVASTATIN CALCIUM 40 MG/1
40 TABLET, COATED ORAL DAILY
Qty: 90 TABLET | Refills: 3 | Status: SHIPPED | OUTPATIENT
Start: 2025-02-01

## 2025-02-01 RX ORDER — ROSUVASTATIN CALCIUM 40 MG/1
40 TABLET, COATED ORAL NIGHTLY
Qty: 10 TABLET | Refills: 0 | Status: SHIPPED | OUTPATIENT
Start: 2025-02-01

## 2025-02-01 RX ORDER — BLOOD-GLUCOSE METER
1 KIT MISCELLANEOUS DAILY
Qty: 100 STRIP | Refills: 3 | Status: SHIPPED | OUTPATIENT
Start: 2025-02-01

## 2025-02-01 RX ORDER — LISINOPRIL 40 MG/1
40 TABLET ORAL DAILY
Qty: 90 TABLET | Refills: 3 | Status: SHIPPED | OUTPATIENT
Start: 2025-02-01

## 2025-02-01 RX ORDER — LANCETS 28 GAUGE
1 EACH MISCELLANEOUS DAILY
Qty: 100 EACH | Refills: 3 | Status: SHIPPED | OUTPATIENT
Start: 2025-02-01

## 2025-02-01 RX ORDER — LISINOPRIL 40 MG/1
40 TABLET ORAL DAILY
Qty: 10 TABLET | Refills: 0 | Status: SHIPPED | OUTPATIENT
Start: 2025-02-01

## 2025-02-01 RX ORDER — AMLODIPINE BESYLATE 5 MG/1
5 TABLET ORAL DAILY
Qty: 10 TABLET | Refills: 0 | Status: SHIPPED | OUTPATIENT
Start: 2025-02-01

## 2025-02-01 NOTE — TELEPHONE ENCOUNTER
Pending 10 days worth of patient's amlodipine, rosuvastatin and lisinopril, so patient has enough until mail order arrives.    Patient requesting all of their medications be transferred:  [facility] [place], [place] - [address] [address] [facility] [phone], [phone]     [phone] is [facility] number    Fax [phone]    Original Pharmacy - [facility]  Rosuvastatin 40 mg    Date: 1/21/25    Qty: #90    Rfl: 3 additional    Metformin 1000 mg    Date: 1/21/25    Qty: #180    Rfl: 0 additional    Lisinopril 40 mg    Date: 1/21/25    Qty: #90    Rfl: 3 additional    Freestyle Lite test strips    Date: 12/16/24    Qty: #100    Rfl: 3 additional    Freestyle Lancets    Date: 1/21/25    Qty: #100    Rfl: 3 additional    Amlodipine 5 mg    Date: 1/21/25    Qty: #90    Rfl: 3 additional    Transferring all prescriptions along with original qtys and rfls. Patient has not filled on these prescriptions that were sent to [facility].

## 2025-02-01 NOTE — TELEPHONE ENCOUNTER
Patient is requesting refill for     amLODIPine 5 MG Oral Tab,    LISINOPRIL 40 MG Oral Tab , rosuvastatin 40 MG Oral Tab   He is changing pharmacy and needs everything changed to them   [facility]) [place], [place] - [address] [address] [phone], [phone]    [phone] is [facility] number       Fax [phone]    Patient is requesting 10 pills  for each sent to [facility] in the meantime

## 2025-02-01 NOTE — TELEPHONE ENCOUNTER
Please kindly review; protocol failed or medication has no protocol attached.     Patient requesting a 10 day supply of amlodipine, rosuvastatin and lisinopril be sent to their local [facility], due to their mail order not arriving on time.    Recent Visit  Date Type Provider Dept   01/29/25 Office Visit Lexi Mccarty APRN [facility] Appointments  None    Requested Prescriptions   Pending Prescriptions Disp Refills    amLODIPine 5 MG Oral Tab 10 tablet 0     Sig: Take 1 tablet (5 mg total) by mouth daily.       Hypertension Medications Protocol Passed - 2/1/2025 10:38 AM        Passed - CMP or BMP in past 12 months        Passed - Last BP reading less than 140/90     BP Readings from Last 1 Encounters:   01/29/25 135/81               Passed - In person appointment or virtual visit in the past 12 mos or appointment in next 3 mos     Recent Outpatient Visits              3 days ago Controlled type 2 diabetes mellitus without complication, without long-term current use of insulin ([facility])    [facility], Lexi Simmons APRN    Office Visit    5 days ago Prostate cancer ([facility])    [facility], Eulalia Caballero APRN    Office Visit    1 month ago Prostate cancer ([facility])    [facility], Eulalia Caballero APRN    Office Visit    2 months ago Pre-operative general physical examination    [facility], Lexi Simmons APRN    Office Visit    6 months ago Prostate cancer ([facility])    [facility], Jose Angel Aquino MD    Office Visit                      Passed - EGFRCR or GFRNAA > 50     GFR Evaluation  EGFRCR: 70 , resulted on 12/4/2024          Passed - Medication is active on med list          lisinopril 40 MG Oral Tab 10 tablet 0     Sig: Take 1 tablet (40 mg total) by mouth daily.       Hypertension  Medications Protocol Passed - 2/1/2025 10:38 AM        Passed - CMP or BMP in past 12 months        Passed - Last BP reading less than 140/90     BP Readings from Last 1 Encounters:   01/29/25 135/81               Passed - In person appointment or virtual visit in the past 12 mos or appointment in next 3 mos     Recent Outpatient Visits              3 days ago Controlled type 2 diabetes mellitus without complication, without long-term current use of insulin ([facility])    [facility], Lexi Simmons APRN    Office Visit    5 days ago Prostate cancer ([facility])    [facility], Eulalia Caballero APRN    Office Visit    1 month ago Prostate cancer ([facility])    [facility], Eulalia Caballero APRN    Office Visit    2 months ago Pre-operative general physical examination    [facility], Lexi Simmons APRN    Office Visit    6 months ago Prostate cancer ([facility])    [facility], Jose Angel Aquino MD    Office Visit                      Passed - EGFRCR or GFRNAA > 50     GFR Evaluation  EGFRCR: 70 , resulted on 12/4/2024          Passed - Medication is active on med list          rosuvastatin 40 MG Oral Tab 10 tablet 0     Sig: Take 1 tablet (40 mg total) by mouth nightly.       Cholesterol Medication Protocol Failed - 2/1/2025 10:38 AM        Failed - Medication is active on med list        Passed - ALT < 80     Lab Results   Component Value Date    ALT 25 02/03/2024             Passed - ALT resulted within past year        Passed - Lipid panel within past 12 months     Lab Results   Component Value Date    CHOLEST 97 02/03/2024    TRIG 47 02/03/2024    HDL 62 (H) 02/03/2024    LDL 23 02/03/2024    VLDL 6 02/03/2024    TCHDLRATIO 2.9 06/29/2017    NONHDLC 35 02/03/2024             Passed - In person appointment or virtual visit in  the past 12 mos or appointment in next 3 mos     Recent Outpatient Visits              3 days ago Controlled type 2 diabetes mellitus without complication, without long-term current use of insulin ([facility])    [facility], [facility], Lexi Simmons, VIKTORIYA    Office Visit    5 days ago Prostate cancer ([facility])    [facility], Eulalia Caballero, APRCAIN    Office Visit    1 month ago Prostate cancer ([facility])    [facility], Eulalia Caballero, APRCAIN    Office Visit    2 months ago Pre-operative general physical examination    [facility], MerlinLexi Gutierrez, APRCAIN    Office Visit    6 months ago Prostate cancer ([facility])    [facility], Jose Angel Aquino MD    Office Visit                       Signed Prescriptions Disp Refills    amLODIPine 5 MG Oral Tab 90 tablet 3     Sig: Take 1 tablet (5 mg total) by mouth daily.       There is no refill protocol information for this order       FreeStyle Lancets Does not apply Misc 100 each 3     Si Lancet by Finger stick route daily.       There is no refill protocol information for this order       Glucose Blood (FREESTYLE LITE TEST) In Vitro Strip 100 strip 3     Si strip by In Vitro route daily.       There is no refill protocol information for this order       lisinopril 40 MG Oral Tab 90 tablet 3     Sig: Take 1 tablet (40 mg total) by mouth daily.       There is no refill protocol information for this order       metFORMIN HCl 1000 MG Oral Tab 180 tablet 0     Sig: Take 1 tablet (1,000 mg total) by mouth 2 (two) times daily with meals.       There is no refill protocol information for this order       rosuvastatin 40 MG Oral Tab 90 tablet 3     Sig: Take 1 tablet (40 mg total) by mouth daily.       There is no refill protocol information for this order            Recent Outpatient Visits               3 days ago Controlled type 2 diabetes mellitus without complication, without long-term current use of insulin (HCC)    [facility], [facility], Lexi Simmons, VIKTORIYA    Office Visit    5 days ago Prostate cancer ([facility])    [facility], Eulalia Caballero, VIKTORIYA    Office Visit    1 month ago Prostate cancer ([facility])    [facility], Eulalia Caballero, VIKTORIYA    Office Visit    2 months ago Pre-operative general physical examination    [facility], Lexi Simmons APRN    Office Visit    6 months ago Prostate cancer ([facility])    [facility], Jose Angel Aquino MD    Office Visit

## 2025-02-06 NOTE — TELEPHONE ENCOUNTER
From 2/1/25 \" Patient requesting all of their medications be transferred:  [facility]) [place], [place] - [address] [address] [facility] [phone], [phone]\"

## 2025-02-13 RX ORDER — LISINOPRIL 40 MG/1
40 TABLET ORAL DAILY
Qty: 10 TABLET | Refills: 0 | OUTPATIENT
Start: 2025-02-13

## 2025-02-13 RX ORDER — ROSUVASTATIN CALCIUM 40 MG/1
40 TABLET, COATED ORAL NIGHTLY
Qty: 10 TABLET | Refills: 0 | OUTPATIENT
Start: 2025-02-13

## 2025-02-13 RX ORDER — AMLODIPINE BESYLATE 5 MG/1
5 TABLET ORAL DAILY
Qty: 10 TABLET | Refills: 0 | OUTPATIENT
Start: 2025-02-13

## 2025-02-13 NOTE — TELEPHONE ENCOUNTER
amLODIPine 5 MG Oral Tab 90 tablet 3 2/1/2025 --    Sig - Route: Take 1 tablet (5 mg total) by mouth daily. - Oral    Sent to pharmacy as: amLODIPine Besylate 5 MG Oral Tablet (Norvasc)    E-Prescribing Status: Receipt confirmed by pharmacy (2/1/2025 10:37 AM CST)      Pharmacy    [facility] [address] [address] [phone], [phone]      Disp Refills Start End    lisinopril 40 MG Oral Tab 90 tablet 3 2/1/2025 --    Sig - Route: Take 1 tablet (40 mg total) by mouth daily. - Oral    Sent to pharmacy as: Lisinopril 40 MG Oral Tablet (Prinivil; Zestril)    E-Prescribing Status: Receipt confirmed by pharmacy (2/1/2025 10:37 AM CST)      Pharmacy    [facility] ([facility]) [place] [id number] [phone], [phone]      Disp Refills Start End    rosuvastatin 40 MG Oral Tab 90 tablet 3 2/1/2025 --    Sig - Route: Take 1 tablet (40 mg total) by mouth daily. - Oral    Sent to pharmacy as: Rosuvastatin Calcium 40 MG Oral Tablet (Crestor)    E-Prescribing Status: Receipt confirmed by pharmacy (2/1/2025 10:37 AM CST)      Pharmacy    [facility] ([facility]) [facility] [address] [address] [phone], [phone]

## 2025-02-28 ENCOUNTER — TELEPHONE (OUTPATIENT)
Dept: PHYSICAL THERAPY | Facility: HOSPITAL | Age: 65
End: 2025-02-28

## 2025-03-03 ENCOUNTER — OFFICE VISIT (OUTPATIENT)
Dept: PHYSICAL THERAPY | Age: 65
End: 2025-03-03
Payer: COMMERCIAL

## 2025-03-03 DIAGNOSIS — C61 PROSTATE CANCER (HCC): Primary | ICD-10-CM

## 2025-03-03 PROCEDURE — 97530 THERAPEUTIC ACTIVITIES: CPT

## 2025-03-03 PROCEDURE — 97112 NEUROMUSCULAR REEDUCATION: CPT

## 2025-03-03 PROCEDURE — 97162 PT EVAL MOD COMPLEX 30 MIN: CPT

## 2025-03-03 NOTE — PATIENT INSTRUCTIONS
Access Code: [email or web address]  URL: [email or web address]  Date: 03/03/2025  Prepared by: Aide Reyes    Exercises  - Supine Diaphragmatic Breathing with Pelvic Floor Lengthening  - 3 x daily - 7 x weekly - 1-2 sets - 10 reps  - Supine Butterfly Groin Stretch  - 2 x daily - 7 x weekly - 5 reps - 20 second hold  - Supine Pelvic Floor Contraction  - 3 x daily - 7 x weekly - 1-2 sets - 10 reps    Patient Education  - High-Fiber Diet to Support Pelvic Health  - Urinary Incontinence  - Get To Know Your Pelvic Floor- Male

## 2025-03-03 NOTE — PROGRESS NOTES
MUSCULOSKELETAL AND PELVIC FLOOR EVALUATION:     Diagnosis:   Prostate cancer and urinary incontinence Patient:  Mario Loera (65 year old, male)        Referring Provider: Eulalia Allison  Today's Date   3/3/2025    Precautions:  Cancer (DM)   Date of Evaluation: 03/03/25  Next MD visit: 6/2025  Date of Surgery: 12/3/2024 (prostectomy and umbilical hernia repair)     PATIENT SUMMARY   Summary of chief complaints: urinary incontinence post prostectomy  History of current condition: Current PSA is undectable. Urinary incontinence began after prostate surgery and reports that his symptom are unchanging. He reports mild leakage in the evening and sometime he has no leakage. He changes his pad 4x/day. He feels better when he is moving and has less leakage. Bowels are normal and he has BM 1x/day. He denies pain. He reports that the level of his leakage intensity is at an 8/10. He drinks 2x 8 oz of soda. He denies pain.   Pain level: current 0 /10, at best 0 /10, at worst 0 /10  Description of symptoms: occasional dribbles; medium leaking during the day time   Occupation: [employer]   Leisure activities/Hobbies: sedentary life   Prior level of function: lives alone; independenent with housechore; shopping  Current limitations: difficulty with coughing, blowing nose, rising up from chair; reaching overhead; carrying heavy weight  Pt goals: to stop leakage    Outpatient Therapy Pelvic Health Intake       Question 3/2/2025 12:19 PM CST - Filed by Patient    Please provide details on the following urinary history / complaints     Frequency of urination: # of times/day 10    Frequency of urination: # of times/night 4    When you have the urge to urinate, how long can you delay before you have to go to the toilet? (# of minutes or hours) 5    Do you have a history of urinary tract infections: Yes    Do you have a history of urine loss (select all that apply)       How many times a day does leakage occur? 5    If you have  leakage, what type(s) of protective device(s) do you use? (Select all that apply) Incontinence brief    On average, how many pads do you use each day? 5    Do you soak the pad fully? Yes    Do you change the pad each time it is wet? No    Do you experience any of these symptoms? (Select all that apply) Cannot get to the toilet in time     Have difficulty initiating a urine stream     Have difficulty stopping the urine stream     Dribble urine when you are urinating     Dribble after you empty your bladder    Have you ever had any of the following treatment:     Have you ever done exercises to control urine loss? If so, for how long? [place]egell for 2 months 2 or 3 times a day    Has your doctor ever prescribed any medication for urine loss?       Have you had any surgical procedures to treat urine loss? No    Please provide details on the following bowel history / complaints.     How many bowel movements do you have per day? 2    How many bowel movements do you have per night? 0    Do you experience diarrhea? If yes, how often? Occasionally    Please provide details on your daily fluid/food intake.     On average, what is your daily fluid intake (1 glass=8ounces)? (# of glasses per day) 50    How many are caffeinated? (# of glasses per day) 14    Do you restrict fluids because of incontenence (leakage)? No    Do you include fiber in your diet (fruits, vegetables, bran, etc.)? No    Psychosocial information     Do you live alone? Yes    Which recreational activities do you participate in if any? No    Have you had to restrict your activities due to your pelvic health concerns? No    On a scale of 0 (no impairments) to 10 (severe impairments), what are your feelings about your urinary or bowel incontinence or pelvic pain? 8    Have you had any changes in intimate relationships/sexual function due to your symptoms?  Yes    Your personal safety is of utmost importance to us at [facility], please answer the  following questions:     Are you being hurt, frightened, demeaned, or taken advantage of by anyone at your home or in your life?  No    Have you recently had thoughts of hurting yourself? No    Have you tried to hurt yourself in the past?  No    Pelvic Organ Prolapse Distress Inventory 6 Score (range: 0 - 100) Incomplete    Colorectal-Anal Distress Inventory 8 Score (range: 0 - 100) Incomplete    Urinary Distress Inventory 6 Score (range: 0 - 100) Incomplete    PFDI Summary Score (range: 0 - 300) Incomplete            Past medical history was reviewed with Mario.  Significant findings include: appendectomy; 2x sumbical repair; DM; HTN  Imaging/Tests:     Mario  has a past medical history of Calculus of kidney, High blood pressure, High cholesterol, Kidney stones, Other and unspecified hyperlipidemia, Prostatitis, Type II or unspecified type diabetes mellitus without mention of complication, not stated as uncontrolled, and Unspecified essential hypertension.  He  has a past surgical history that includes appendectomy; hernia surgery; thoracotomy,excis bullae; colonoscopy; colonoscopy (N/A, 12/30/2019); and laparoscopy, surgical prostatectomy, retropubic radical, w/nerve sparing (12/03/2024).    ASSESSMENT  Mario presents to physical therapy evaluation with primary c/o urinary incontinence post prostectomy. The results of the objective tests and measures show decreased postural awareness and strength; increased pelvic floor muscle tone; pelvic floor weakness; increase muscle tone at B hip adductors and hip external rotators; accessory contractions of hip adductors and gluteals with pelvic floor contractions. These factors are contributing to signs and symptoms of urinary incontinence s/p post-prostectomy. Functional deficits include but are not limited to difficulty with coughing, blowing nose, rising up from chair; reaching overhead; carrying heavy weight. Signs and symptoms are consistent with diagnosis of Prostate  cancer and urinary incontinence and urinary incontinence. Pt and PT discussed evaluation findings, pathology, POC and HEP.  Pt voiced understanding and performs HEP correctly without reported pain. Skilled Physical Therapy is medically necessary to address the above impairments and reach functional goals.    OBJECTIVE:   Musculoskeletal  Posture: Posture: flexed posture; forward head; R sholuder girdle elevated; decreased lumbar lordosis   Pelvic Alignment: NT   External Palpation: no TTP; increased muscle tone at B hip adductors; increased fascial restriction/muscle tone at lower abdominal reagion   Scars (location/surgery): umbilical region, B lower abdominal quadrants with minimal/moderate scare adhesions (no c/o pain)  Abdominal Wall Integrity: distended   Diastasis Recti (finger width depth while contracted):    Above Umbilicus: surgically repaired    Umbilicus: WNL    Below Umbilicus: WNL   Special Tests:   NT    Informed consent for internal pelvic evaluation given:Yes     External Observation:   Voluntary contraction: present   Voluntary relaxation: present (decreased)   Involuntary contraction: present   Involuntary relaxation: present   Mons pubis: WNL   Perineal body: WNL    Internal Examination     Pelvic Floor Muscle strength: (PERF= Power/Endurance/Reps/Fast) MMT:  Power Endurance REPS Fast   4 5 5 NT     External Anal Sphincter: intact   Accessory Muscle Use: gluteals; adductors     Tissue Laxity Test:  Anterior Wall: min  Posterior Wall: other (use comment) (decreased)   Apical: WNL     Eccentric lengthening contraction: minimal signs of eccentric lengthening contraction   Bearing down Valsalva Maneuver (2-3x): produces leakage     Internal Palpation:  WNL except  Superficial Transverse Perineal  minimal restriction   Bulbocavernosus  not tested   Ischiocavernosus minimal restriction   Deep Transverse Perineal not tested   Compress Urethra/Sphincter minimal restriction   Urethrovaginalis not tested    Pubococcygeus/Pubovaginalis moderate restriction; tenderness   Iliococcygeus moderate restriction; tenderness   Coccygeus moderate restriction; tenderness   Piriformis (palpated externally)  moderate restriction   Obturator Internus  moderate restriction; tenderness   Pelvic Clock minimal restriction; moderate restriction     MIRANDA ROM WNL and Strength (5/5) except below:  (* denotes performed with pain)  Trunk ROM MMT (-/5)     Flex mod loss       Ext mod loss      R L R L     Side bend             Rotation           ,   Hip   ROM MMT (-/5)    R L R L     Flex (L2)     5/5 5/5     Ext              Abd             ER     4/4 4/5     IR             ,   Knee   ROM MMT (-/5)    R L R L     Flex     5/5 5/5     Ext (L3)     5/5 5/5     ,   Ankle/Foot   ROM MMT (-/5)    R L R L     PF             DF (L4)     5/5 5/5     Inversion             Eversion             Grt Toe Ext (L5)               Flexibility:  LE Flexibility R L     Hip Flexor decreased decreased     Hamstrings decreased decreased     ITB         Piriformis decreased decreased     Quads         Gastroc-soleus decreased decreased       Neurological    Sensory/Reflex:  Vestibule: not tested   Anal [place]: not tested   Cough Reflex: present     Balance and Functional Mobility  Gait: pt ambulates on level ground with decreased foot clearance; other (use comment) (decreased hip extension).      Today's Treatment and Response:   Pt education was provided on exam findings, treatment diagnosis, treatment plan, expectations, and prognosis. Pt reported no adverse effects to treatment.  Today's Treatment       3/3/2025   PT Treatment   Therapeutic Exercise Hooklying: BKFO     Neuro Re-Ed Diaphragmatic breathing with internal manual facilitation  PF elongation and contraction with breathing with internal manual cueing   Therapeutic Activity Pt education on bladder irritants including caffeine  Pt education on benefit of fiber for bowel and bladder health  Pt education on  importance of hydration and increasing water intake  Pt education on importance of aerobic activity such as daily walking program   Neuro Re-Ed Min 10   Therapeutic Activity Min 20   Evaluation Min 20   Total of Timed Procedures 30   Total of Service Based 20   Total Treatment Time 50          No data to display                 Patient was instructed in and issued a HEP for: Access Code: [email or web address]  URL: [email or web address]  Date: 03/03/2025  Prepared by: Aide Reyes    Exercises  - Supine Diaphragmatic Breathing with Pelvic Floor Lengthening  - 3 x daily - 7 x weekly - 1-2 sets - 10 reps  - Supine Butterfly Groin Stretch  - 2 x daily - 7 x weekly - 5 reps - 20 second hold  - Supine Pelvic Floor Contraction  - 3 x daily - 7 x weekly - 1-2 sets - 10 reps    Patient Education  - High-Fiber Diet to Support Pelvic Health  - Urinary Incontinence  - Get To Know Your Pelvic Floor- Male    Charges:  PT EVAL: Moderate Complexity, 1 NMR, 1 TA  In agreement with evaluation findings and clinical rationale, this evaluation involved MODERATE COMPLEXITY decision making due to 1-2 personal factors/comorbidities, 3 or more body structures involved/activity limitations, and evolving symptoms as documented in the evaluation.                                                                       PLAN OF CARE:    Goals: (to be met in 12 visits)   Goals       Therapy Goals      Not Met Progress Toward Partially Met Met   Patient will improve PERF score to at least 5/10/10//10 for improved pelvic floor function and pelvic organ support. [] [] [] []   Patient will report decreased urinary frequency to </= 1x/day and 0x/night indicating normalizing micturition reflex for improved bladder health and function. [] [] [] []   Patient will report ability to postpone urination for at least 30 mins after initial urge presents for improved ability to participate in community outings without fear of UI. [] [] [] []   Patient  will report use of KNACK contraction at least 75% of the time to re-establish cough reflex and mitigate urinary leakage with increases in intra-abdominal pressure. [] [] [] []   Patient will report adherence to HEP for continued exercise benefits following cessation of PT. [] [] [] []      Patient will demonstrate PFM lengthening WNL with coordinated diaphragmatic breathing x 10 reps to alleviate PFM pain and muscle tension. [] [] [] []                   Frequency / Duration: Patient will be seen 1x/week or a total of 12  visits over a 90 day period. Treatment will include: Gait training; Manual Therapy; Neuromuscular Re-education; Self-Care Home Management; Therapeutic Activities; Therapeutic Exercise; Home Exercise Program instruction; Other (use comment) (cold/hot pack prn)    Education or treatment limitation: None   Rehab Potential: good       Patient/Family/Caregiver was advised of these findings, precautions, and treatment options and has agreed to actively participate in planning and for this course of care.    Thank you for your referral. Please co-sign or sign and return this letter via fax as soon as possible to [phone]. If you have any questions, please contact me at Dept: [phone]    Sincerely,  Electronically signed by therapist: Aide Reyes, PT  Physician's certification required: Yes  I certify the need for these services furnished under this plan of treatment and while under my care.    X___________________________________________________ Date____________________    Certification From: 3/3/2025  To:6/1/2025

## 2025-03-10 ENCOUNTER — APPOINTMENT (OUTPATIENT)
Dept: PHYSICAL THERAPY | Age: 65
End: 2025-03-10
Payer: COMMERCIAL

## 2025-03-11 ENCOUNTER — OFFICE VISIT (OUTPATIENT)
Dept: PHYSICAL THERAPY | Age: 65
End: 2025-03-11
Payer: COMMERCIAL

## 2025-03-11 PROCEDURE — 97140 MANUAL THERAPY 1/> REGIONS: CPT

## 2025-03-11 PROCEDURE — 97112 NEUROMUSCULAR REEDUCATION: CPT

## 2025-03-11 PROCEDURE — 97110 THERAPEUTIC EXERCISES: CPT

## 2025-03-17 ENCOUNTER — OFFICE VISIT (OUTPATIENT)
Dept: PHYSICAL THERAPY | Age: 65
End: 2025-03-17
Payer: COMMERCIAL

## 2025-03-17 PROCEDURE — 97110 THERAPEUTIC EXERCISES: CPT

## 2025-03-17 PROCEDURE — 97140 MANUAL THERAPY 1/> REGIONS: CPT

## 2025-03-17 PROCEDURE — 97112 NEUROMUSCULAR REEDUCATION: CPT

## 2025-03-17 NOTE — PROGRESS NOTES
Patient: Mario Loera (65 year old, male) Referring Provider:  Insurance:   Diagnosis: Prostate cancer and urinary incontinence Eulalia Allison  [employer]   Date of Surgery: 12/3/2024 (prostectomy and umbilical hernia repair) Next MD visit:  N/A   Precautions:  Cancer (DM) 6/2025 Referral Information:    Date of Evaluation: Req: 0, Auth: 0, Exp:     03/03/25 POC Auth Visits:  12       Today's Date   3/17/2025    Subjective  pt reports that he is having less leaking during the daytime. He has no leakage at night. He has no leakage at work. Reaching overhead still provokes leakage.       Pain: 0/10     Objective  see tx table            Assessment  Added shoulder flexion stretch overhead to improve fascial mobility, posture and mange intra-abdominal pressure. Fascial mobility at L lower abodminal quadrant has improved and pt had no symptoms of urge or leakage.    Goals: (to be met in 12 visits)    Goals         Therapy Goals         Not Met Progress Toward Partially Met Met   Patient will improve PERF score to at least 5/10/10//10 for improved pelvic floor function and pelvic organ support. []  []  []  []    Patient will report decreased urinary frequency to </= 1x/day and 0x/night indicating normalizing micturition reflex for improved bladder health and function. []  []  []  []    Patient will report ability to postpone urination for at least 30 mins after initial urge presents for improved ability to participate in community outings without fear of UI. []  []  []  []    Patient will report use of KNACK contraction at least 75% of the time to re-establish cough reflex and mitigate urinary leakage with increases in intra-abdominal pressure. []  []  []  []    Patient will report adherence to HEP for continued exercise benefits following cessation of PT. []  []  []  []       Patient will demonstrate PFM lengthening WNL with coordinated diaphragmatic breathing x 10 reps to alleviate PFM pain and muscle tension. []   []  []  []             Plan  Continoue to focus on ROM, strengthening and management of intra-abdominal pressure.    Treatment Last 4 Visits       3/3/2025 3/11/2025 3/17/2025   PT Treatment   Treatment Day  2 3   Therapeutic Exercise Hooklying: BKFO       Neuro Re-Ed Diaphragmatic breathing with internal manual facilitation  PF elongation and contraction with breathing with internal manual cueing     Therapeutic Activity Pt education on bladder irritants including caffeine  Pt education on benefit of fiber for bowel and bladder health  Pt education on importance of hydration and increasing water intake  Pt education on importance of aerobic activity such as daily walking program     Neuro Re-Ed Min 10     Therapeutic Activity Min 20     Evaluation Min 20     Total of Timed Procedures 30     Total of Service Based 20     Total Treatment Time 50            3/11/2025 3/17/2025   Pelvic Treatment   Treatment Day 2 3   Therapeutic Exercise Hooklying: BKFO 20x  Supine: HS stretch 20\"x5  Supine: piriformis stretch 20\"x5  Hooklying: LTR 20x  Hooklying: SKTC stretch 10\"x5 Hooklying: BKFO 20x  Supine: HS stretch with strap 20\"x5  Supine: piriformis stretch 20\"x5  Hooklying: LTR 20x  Hooklying: SKTC stretch 10\"x5  Hooklying: shoulder flexion with dowel, emphasis on trunk elongation 10x2   Neuro Re-Education Supine Diaphragmatic breathing  Supine Diaphragmatic breathing with PF lengthening  Sit to/from stand with breathing/utilizing Knack Supine Diaphragmatic breathing  Supine Diaphragmatic breathing with PF lengthening  Supine: Diaphragmatic breathing with TrA contactions 10x  Sit to/from stand with breathing/utilizing Knack   Manual Therapy Supine Abdominal massage and gentle scar mob Supine Abdominal massage and gentle scar mob   Therapeutic Exercise Minutes 23 25   Neuro Re-Educ Minutes 15 15   Manual Therapy Minutes 10 8   Total Time Of Timed Procedures 48 48   Total Time Of Service-Based Procedures 0 0   Total Treatment  Time 48 48   HEP Diaphragmatic breathing with PF lengthening  Sit to/from stand with breathing/Knack  Hooklying: LTR and BKFO  Abdominal massage Access Code: [email or web address]  URL: [email or web address]  Date: 03/17/2025  Prepared by: Aide Reyes    Exercises  - Supine Transversus Abdominis Bracing - Hands on Stomach  - 2 x daily - 7 x weekly - 2 sets - 10 reps  - Supine Shoulder Flexion AAROM with Dowel  - 2 x daily - 7 x weekly - 2 sets - 10 reps        HEP  Access Code: [email or web address]  URL: [email or web address]  Date: 03/17/2025  Prepared by: Aide Reyes    Exercises  - Supine Transversus Abdominis Bracing - Hands on Stomach  - 2 x daily - 7 x weekly - 2 sets - 10 reps  - Supine Shoulder Flexion AAROM with Dowel  - 2 x daily - 7 x weekly - 2 sets - 10 reps    Charges  2 TE, 1 NMR, 1 MT

## 2025-03-24 ENCOUNTER — OFFICE VISIT (OUTPATIENT)
Dept: PHYSICAL THERAPY | Age: 65
End: 2025-03-24
Payer: COMMERCIAL

## 2025-03-24 PROCEDURE — 97140 MANUAL THERAPY 1/> REGIONS: CPT

## 2025-03-24 PROCEDURE — 97112 NEUROMUSCULAR REEDUCATION: CPT

## 2025-03-24 PROCEDURE — 97110 THERAPEUTIC EXERCISES: CPT

## 2025-03-24 NOTE — PROGRESS NOTES
Patient: Mario Loera (65 year old, male) Referring Provider:  Insurance:   Diagnosis:   Elualia Allison  [employer]   Date of Surgery: No data recorded Next MD visit:  N/A   Precautions:  Cancer No data recorded Referral Information:    Date of Evaluation: Req: 0, Auth: 0, Exp:     No data recorded POC Auth Visits:  12        Today's Date   3/24/2025    Subjective  pt reports that he has no leakage at night. He sitll can have leakage when he lifts overhead. He report that he can hold his urine in for a longer period of time after getting the initial urge.       Pain: 0/10     Objective          See tx table     Assessment  Addressed fascial restrictions at B diaphragm (L side more restricted than R) to allow for better ease with OH lifting and to improve ability to manage IAP. No provocation of leakage during tx.    Goals (to be met in 12 visits)       Goals         Therapy Goals         Not Met Progress Toward Partially Met Met   Patient will improve PERF score to at least 5/10/10//10 for improved pelvic floor function and pelvic organ support. []  [x]  []  []    Patient will report decreased urinary frequency to </= 1x/day and 0x/night indicating normalizing micturition reflex for improved bladder health and function. []  [x]  []  []    Patient will report ability to postpone urination for at least 30 mins after initial urge presents for improved ability to participate in community outings without fear of UI. []  [x]  []  []    Patient will report use of KNACK contraction at least 75% of the time to re-establish cough reflex and mitigate urinary leakage with increases in intra-abdominal pressure. []  [x]  []  []    Patient will report adherence to HEP for continued exercise benefits following cessation of PT. []  [x]  []  []       Patient will demonstrate PFM lengthening WNL with coordinated diaphragmatic breathing x 10 reps to alleviate PFM pain and muscle tension. []  [x]  []  []                Plan  Focus  on ROM and progressive strengthening.    Treatment Last 4 Visits       3/3/2025 3/11/2025 3/17/2025 3/24/2025   PT Treatment   Treatment Day  2 3 4   Therapeutic Exercise Hooklying: BKFO        Neuro Re-Ed Diaphragmatic breathing with internal manual facilitation  PF elongation and contraction with breathing with internal manual cueing      Therapeutic Activity Pt education on bladder irritants including caffeine  Pt education on benefit of fiber for bowel and bladder health  Pt education on importance of hydration and increasing water intake  Pt education on importance of aerobic activity such as daily walking program      Neuro Re-Ed Min 10      Therapeutic Activity Min 20      Evaluation Min 20      Total of Timed Procedures 30      Total of Service Based 20      Total Treatment Time 50             3/11/2025 3/17/2025 3/24/2025   Pelvic Treatment   Treatment Day 2 3 4   Therapeutic Exercise Hooklying: BKFO 20x  Supine: HS stretch 20\"x5  Supine: piriformis stretch 20\"x5  Hooklying: LTR 20x  Hooklying: SKTC stretch 10\"x5 Hooklying: BKFO 20x  Supine: HS stretch with strap 20\"x5  Supine: piriformis stretch 20\"x5  Hooklying: LTR 20x  Hooklying: SKTC stretch 10\"x5  Hooklying: shoulder flexion with dowel, emphasis on trunk elongation 10x2 Hooklying: BKFO 20x  Supine: HS stretch with strap 20\"x5  Supine: piriformis stretch 20\"x5  Hooklying: LTR 20x  Hooklying: SKTC stretch 10\"x5  Hooklying: shoulder flexion with dowel, emphasis on trunk elongation 10x  Standing: shoulder flex wall slides, emphasis on trunk elongation 10x2   Neuro Re-Education Supine Diaphragmatic breathing  Supine Diaphragmatic breathing with PF lengthening  Sit to/from stand with breathing/utilizing Knack Supine Diaphragmatic breathing  Supine Diaphragmatic breathing with PF lengthening  Supine: Diaphragmatic breathing with TrA contactions 10x  Sit to/from stand with breathing/utilizing Knack Supine Diaphragmatic breathing with PF lengthening  Supine:  Diaphragmatic breathing (cueing to expand through ribs on inhalation) with TrA contactions 10x  Sit to/from stand with breathing/utilizing Knack   Manual Therapy Supine Abdominal massage and gentle scar mob Supine Abdominal massage and gentle scar mob Supine Abdominal massage and gentle scar mob; stm at B diaphragm   Therapeutic Exercise Minutes 23 25 23   Neuro Re-Educ Minutes 15 15 10   Manual Therapy Minutes 10 8 14   Total Time Of Timed Procedures 48 48 47   Total Time Of Service-Based Procedures 0 0 0   Total Treatment Time 48 48 47   HEP Diaphragmatic breathing with PF lengthening  Sit to/from stand with breathing/Knack  Hooklying: LTR and BKFO  Abdominal massage Access Code: [email or web address]  URL: [email or web address]  Date: 03/17/2025  Prepared by: Aide Clearybo    Exercises  - Supine Transversus Abdominis Bracing - Hands on Stomach  - 2 x daily - 7 x weekly - 2 sets - 10 reps  - Supine Shoulder Flexion AAROM with Dowel  - 2 x daily - 7 x weekly - 2 sets - 10 reps         HEP  Access Code: [email or web address]  URL: [email or web address]  Date: 03/17/2025  Prepared by: Aide Clearybo    Exercises  - Supine Transversus Abdominis Bracing - Hands on Stomach  - 2 x daily - 7 x weekly - 2 sets - 10 reps  - Supine Shoulder Flexion AAROM with Dowel  - 2 x daily - 7 x weekly - 2 sets - 10 reps    Charges  2 TE, 1 NMR, 1 MT

## 2025-03-31 ENCOUNTER — OFFICE VISIT (OUTPATIENT)
Dept: PHYSICAL THERAPY | Age: 65
End: 2025-03-31
Payer: COMMERCIAL

## 2025-03-31 PROCEDURE — 97112 NEUROMUSCULAR REEDUCATION: CPT

## 2025-03-31 PROCEDURE — 97140 MANUAL THERAPY 1/> REGIONS: CPT

## 2025-03-31 PROCEDURE — 97110 THERAPEUTIC EXERCISES: CPT

## 2025-03-31 NOTE — PROGRESS NOTES
Patient: Mario Loera (65 year old, male) Referring Provider:  Insurance:   Diagnosis:   Eulalia Allison  [employer]   Date of Surgery: No data recorded Next MD visit:  N/A   Precautions:  Cancer No data recorded Referral Information:    Date of Evaluation: Req: 0, Auth: 0, Exp:     No data recorded POC Auth Visits:  12        Today's Date   3/31/2025    Subjective  pt reports that he has less leakage. He may leak with OH reaching and sometimes with coughing. He reports decreased urge sensation. He reports that he still has to change pads 4x/day and this has reduced from previously 5x/day. He has no c/o night time leakage. He admits that he has more leakage during the AM compared to PM. He reports that he leaks more at home compared to when he is at work. He admits that he mostly sits and move around sometimes at his condo. He admits that he doesn't walk much on his days off.       Pain: 0/10     Objective  see tx table          Assessment  Continued to address fascial restrictions at B diaphragm (L side more restricted than R) to allow for better ease with OH lifting and to improve ability to manage IAP. Added child's pose to progress hip opening and improve PF lengthening with breathing. pt will trial reducing pad wearing this week.    Goals (to be met in 12 visits)     Goals         Therapy Goals         Not Met Progress Toward Partially Met Met   Patient will improve PERF score to at least 5/10/10//10 for improved pelvic floor function and pelvic organ support. []  [x]  []  []    Patient will report decreased urinary frequency to </= 1x/day and 0x/night indicating normalizing micturition reflex for improved bladder health and function. []  [x]  []  []    Patient will report ability to postpone urination for at least 30 mins after initial urge presents for improved ability to participate in community outings without fear of UI. []  [x]  []  []    Patient will report use of KNACK contraction at least 75% of the  time to re-establish cough reflex and mitigate urinary leakage with increases in intra-abdominal pressure. []  [x]  []  []    Patient will report adherence to HEP for continued exercise benefits following cessation of PT. []  [x]  []  []       Patient will demonstrate PFM lengthening WNL with coordinated diaphragmatic breathing x 10 reps to alleviate PFM pain and muscle tension. []  [x]  []  []                    Plan  Focus on ROM and progressive strengthening.    Treatment Last 4 Visits  Treatment Day: 5       3/11/2025 3/17/2025 3/24/2025 3/31/2025   Pelvic Treatment   Therapeutic Exercise Hooklying: BKFO 20x  Supine: HS stretch 20\"x5  Supine: piriformis stretch 20\"x5  Hooklying: LTR 20x  Hooklying: SKTC stretch 10\"x5 Hooklying: BKFO 20x  Supine: HS stretch with strap 20\"x5  Supine: piriformis stretch 20\"x5  Hooklying: LTR 20x  Hooklying: SKTC stretch 10\"x5  Hooklying: shoulder flexion with dowel, emphasis on trunk elongation 10x2 Hooklying: BKFO 20x  Supine: HS stretch with strap 20\"x5  Supine: piriformis stretch 20\"x5  Hooklying: LTR 20x  Hooklying: SKTC stretch 10\"x5  Hooklying: shoulder flexion with dowel, emphasis on trunk elongation 10x  Standing: shoulder flex wall slides, emphasis on trunk elongation 10x2 Supine: HS stretch with strap 20\"x5  Supine: piriformis stretch 20\"x5  Hooklying: LTR 20x  Standing: shoulder flex wall slides, emphasis on trunk elongation 10x2   Neuro Re-Education Supine Diaphragmatic breathing  Supine Diaphragmatic breathing with PF lengthening  Sit to/from stand with breathing/utilizing Knack Supine Diaphragmatic breathing  Supine Diaphragmatic breathing with PF lengthening  Supine: Diaphragmatic breathing with TrA contactions 10x  Sit to/from stand with breathing/utilizing Knack Supine Diaphragmatic breathing with PF lengthening  Supine: Diaphragmatic breathing (cueing to expand through ribs on inhalation) with TrA contactions 10x  Sit to/from stand with breathing/utilizing Knack  Supine Diaphragmatic breathing with PF lengthening and contraction  Supine: Diaphragmatic breathing (cueing to expand through ribs on inhalation) with TrA contactions 10x  Hooklying: bridging with hip add at ball with breathing 20x  Child's pose with diaphragmatic breathing x 10 breathes  Squats with diaphragmatic breathing 10x2   Manual Therapy Supine Abdominal massage and gentle scar mob Supine Abdominal massage and gentle scar mob Supine Abdominal massage and gentle scar mob; stm at B diaphragm Supine Abdominal massage and gentle scar mob; stm at B diaphragm   Therapeutic Activity    Pt education - advised pt to reduce pad wearing, particularly when he is at home to reduce dependency on pads and to increased mindfulness   Therapeutic Exercise Minutes 23 25 23 10   Neuro Re-Educ Minutes 15 15 10 23   Manual Therapy Minutes 10 8 14 8   Therapeutic Activity Minutes    3   Total Time Of Timed Procedures 48 48 47 44   Total Time Of Service-Based Procedures 0 0 0 0   Total Treatment Time 48 48 47 44   HEP Diaphragmatic breathing with PF lengthening  Sit to/from stand with breathing/Knack  Hooklying: LTR and BKFO  Abdominal massage Access Code: [email or web address]  URL: [email or web address]  Date: 03/17/2025  Prepared by: Aide Reyes    Exercises  - Supine Transversus Abdominis Bracing - Hands on Stomach  - 2 x daily - 7 x weekly - 2 sets - 10 reps  - Supine Shoulder Flexion AAROM with Dowel  - 2 x daily - 7 x weekly - 2 sets - 10 reps          HEP  Access Code: [email or web address]  URL: [email or web address]  Date: 03/17/2025  Prepared by: Aide Clearybo    Exercises  - Supine Transversus Abdominis Bracing - Hands on Stomach  - 2 x daily - 7 x weekly - 2 sets - 10 reps  - Supine Shoulder Flexion AAROM with Dowel  - 2 x daily - 7 x weekly - 2 sets - 10 reps    Charges  1 TE, 2 NMR, 1 MT

## 2025-04-07 ENCOUNTER — OFFICE VISIT (OUTPATIENT)
Dept: PHYSICAL THERAPY | Age: 65
End: 2025-04-07
Payer: COMMERCIAL

## 2025-04-07 PROCEDURE — 97110 THERAPEUTIC EXERCISES: CPT

## 2025-04-07 PROCEDURE — 97112 NEUROMUSCULAR REEDUCATION: CPT

## 2025-04-07 NOTE — PROGRESS NOTES
Patient: Mario Loera (65 year old, male) Referring Provider:  Insurance:   Diagnosis:   Eulalia Allison  [employer]   Date of Surgery: No data recorded Next MD visit:  N/A   Precautions:  Cancer No data recorded Referral Information:    Date of Evaluation: Req: 0, Auth: 0, Exp:     No data recorded POC Auth Visits:  12        Today's Date   4/7/2025    Subjective  pt reports that he has less leakage. Moving more at home helps. He no longer has c/o urge sensation and can hold his urine. He is able to go to the bathroom every 3-4 hours.       Pain: 0/10     Objective          See tx table     Assessment  Progressed pelvic floor strengthening by increasing PF contraction hold to 10\" without provocation of leakage or c/o difficulty sustaining 10\" hold. Provided tactile cueing to coordinate breathing and spinal ext/flexion with cat/cow.    Goals (to be met in 12 visits)     Goals         Therapy Goals         Not Met Progress Toward Partially Met Met   Patient will improve PERF score to at least 5/10/10//10 for improved pelvic floor function and pelvic organ support. []  [x]  []  []    Patient will report decreased urinary frequency to </= 1x/day and 0x/night indicating normalizing micturition reflex for improved bladder health and function. []  [x]  []  []    Patient will report ability to postpone urination for at least 30 mins after initial urge presents for improved ability to participate in community outings without fear of UI. []  [x]  []  []    Patient will report use of KNACK contraction at least 75% of the time to re-establish cough reflex and mitigate urinary leakage with increases in intra-abdominal pressure. []  [x]  []  []    Patient will report adherence to HEP for continued exercise benefits following cessation of PT. []  [x]  []  []       Patient will demonstrate PFM lengthening WNL with coordinated diaphragmatic breathing x 10 reps to alleviate PFM pain and muscle tension. []  [x]  []  []                         Plan  Focus on ROM and progressive strengthening.    Treatment Last 4 Visits  Treatment Day: 6       3/17/2025 3/24/2025 3/31/2025 4/7/2025   Pelvic Treatment   Therapeutic Exercise Hooklying: BKFO 20x  Supine: HS stretch with strap 20\"x5  Supine: piriformis stretch 20\"x5  Hooklying: LTR 20x  Hooklying: SKTC stretch 10\"x5  Hooklying: shoulder flexion with dowel, emphasis on trunk elongation 10x2 Hooklying: BKFO 20x  Supine: HS stretch with strap 20\"x5  Supine: piriformis stretch 20\"x5  Hooklying: LTR 20x  Hooklying: SKTC stretch 10\"x5  Hooklying: shoulder flexion with dowel, emphasis on trunk elongation 10x  Standing: shoulder flex wall slides, emphasis on trunk elongation 10x2 Supine: HS stretch with strap 20\"x5  Supine: piriformis stretch 20\"x5  Hooklying: LTR 20x  Standing: shoulder flex wall slides, emphasis on trunk elongation 10x2 Hooklying: BKFO 20x  Supine: HS stretch 20\"x5 B  Supine: piriformis stretch 20\"x5 B  Hooklying: LTR 20x  Hooklying: SKTC stretch 20\"x5 B  Standing; hip flexor stretch 20\"x5 B   Neuro Re-Education Supine Diaphragmatic breathing  Supine Diaphragmatic breathing with PF lengthening  Supine: Diaphragmatic breathing with TrA contactions 10x  Sit to/from stand with breathing/utilizing Knack Supine Diaphragmatic breathing with PF lengthening  Supine: Diaphragmatic breathing (cueing to expand through ribs on inhalation) with TrA contactions 10x  Sit to/from stand with breathing/utilizing Knack Supine Diaphragmatic breathing with PF lengthening and contraction  Supine: Diaphragmatic breathing (cueing to expand through ribs on inhalation) with TrA contactions 10x  Hooklying: bridging with hip add at ball with breathing 20x  Child's pose with diaphragmatic breathing x 10 breathes  Squats with diaphragmatic breathing 10x2 Hooklying: diaphragmatic breathing with PF lengthening and contraction (10\" hold) x10 reps  Hooklying: bridging with hip add at ball 20x  Hooklying: bridging with  hip abd at green tband 20x  Quadruped child's pose with diaphragmatic breathing x 10 breaths  Cat/cow with diaphragmatic breathing 10x  Squats with 20# kettle bell 10x2   Manual Therapy Supine Abdominal massage and gentle scar mob Supine Abdominal massage and gentle scar mob; stm at B diaphragm Supine Abdominal massage and gentle scar mob; stm at B diaphragm    Therapeutic Activity   Pt education - advised pt to reduce pad wearing, particularly when he is at home to reduce dependency on pads and to increased mindfulness    Therapeutic Exercise Minutes 25 23 10 23   Neuro Re-Educ Minutes 15 10 23 23   Manual Therapy Minutes 8 14 8    Therapeutic Activity Minutes   3    Total Time Of Timed Procedures 48 47 44 46   Total Time Of Service-Based Procedures 0 0 0 0   Total Treatment Time 48 47 44 46   HEP Access Code: [email or web address]  URL: [email or web address]  Date: 03/17/2025  Prepared by: Aide Reyes    Exercises  - Supine Transversus Abdominis Bracing - Hands on Stomach  - 2 x daily - 7 x weekly - 2 sets - 10 reps  - Supine Shoulder Flexion AAROM with Dowel  - 2 x daily - 7 x weekly - 2 sets - 10 reps   Access Code: [email or web address]  URL: [email or web address]  Date: 04/07/2025  Prepared by: Aide Reyes    Exercises  - Diaphragmatic Breathing in Child's Pose with Pelvic Floor Relaxation  - 1 x daily - 7 x weekly - 3 sets - 10 reps  - Supine Pelvic Floor Contraction  - 1 x daily - 7 x weekly - 3 sets - 10 reps  - Supine Bridge with Pelvic Floor Contraction  - 1 x daily - 7 x weekly - 3 sets - 10 reps  - Supine Diaphragmatic Breathing with Pelvic Floor Lengthening  - 1 x daily - 7 x weekly - 3 sets - 10 reps  - Supine Figure 4 Piriformis Stretch  - 1 x daily - 7 x weekly - 3 sets - 10 reps  - Cat Cow to Child's Pose  - 1 x daily - 7 x weekly - 3 sets - 10 reps        HEP  Access Code: [email or web address]  URL: [email or web address]  Date: 04/07/2025  Prepared by: Aide  Depak[employer]    Exercises  - Diaphragmatic Breathing in Child's Pose with Pelvic Floor Relaxation  - 1 x daily - 7 x weekly - 3 sets - 10 reps  - Supine Pelvic Floor Contraction  - 1 x daily - 7 x weekly - 3 sets - 10 reps  - Supine Bridge with Pelvic Floor Contraction  - 1 x daily - 7 x weekly - 3 sets - 10 reps  - Supine Diaphragmatic Breathing with Pelvic Floor Lengthening  - 1 x daily - 7 x weekly - 3 sets - 10 reps  - Supine Figure 4 Piriformis Stretch  - 1 x daily - 7 x weekly - 3 sets - 10 reps  - Cat Cow to Child's Pose  - 1 x daily - 7 x weekly - 3 sets - 10 reps    Charges  2 TE, 2 NMR

## 2025-04-14 ENCOUNTER — OFFICE VISIT (OUTPATIENT)
Dept: PHYSICAL THERAPY | Age: 65
End: 2025-04-14
Payer: COMMERCIAL

## 2025-04-14 PROCEDURE — 97112 NEUROMUSCULAR REEDUCATION: CPT

## 2025-04-14 PROCEDURE — 97110 THERAPEUTIC EXERCISES: CPT

## 2025-04-14 NOTE — PROGRESS NOTES
Patient: Mario Loera (65 year old, male) Referring Provider:  Insurance:   Diagnosis:   Eulalia Allison  [employer]   Date of Surgery: No data recorded Next MD visit:  N/A   Precautions:  Cancer No data recorded Referral Information:    Date of Evaluation: Req: 0, Auth: 0, Exp:     No data recorded POC Auth Visits:  12        Today's Date   4/14/2025    Subjective  pt reports that he is leaking down to 3x/day. He admits that his leaking can be moderate so that it does fill a pad. pt reports leaking with coughing. He is sleeping through most of the night. He may wake up 3x/week only 1x to get up to use the bathroom.       Pain: 0/10     Objective  see tx table             Assessment  Pt required physical cueing to coordinate spinal extension and flexion with breathing in order to perform cat/cow correctly. Added slouch/overcorrect in sitting to allow for better ease with spinal ext/flex with breathing. Reminded pt to utilized knack technique when he can anticipate a cough or sneeze.    Goals (to be met in 12 visits)       Goals         Therapy Goals         Not Met Progress Toward Partially Met Met   Patient will improve PERF score to at least 5/10/10//10 for improved pelvic floor function and pelvic organ support. []  [x]  []  []    Patient will report decreased urinary frequency to </= 1x/day and 0x/night indicating normalizing micturition reflex for improved bladder health and function. []  [x]  []  []    Patient will report ability to postpone urination for at least 30 mins after initial urge presents for improved ability to participate in community outings without fear of UI. []  [x]  []  []    Patient will report use of KNACK contraction at least 75% of the time to re-establish cough reflex and mitigate urinary leakage with increases in intra-abdominal pressure. []  [x]  []  []    Patient will report adherence to HEP for continued exercise benefits following cessation of PT. []  [x]  []  []       Patient  will demonstrate PFM lengthening WNL with coordinated diaphragmatic breathing x 10 reps to alleviate PFM pain and muscle tension. []  [x]  []  []                          Plan  Focus on ROM and progressive strengthening.    Treatment Last 4 Visits  Treatment Day: 7       3/24/2025 3/31/2025 4/7/2025 4/14/2025   Pelvic Treatment   Therapeutic Exercise Hooklying: BKFO 20x  Supine: HS stretch with strap 20\"x5  Supine: piriformis stretch 20\"x5  Hooklying: LTR 20x  Hooklying: SKTC stretch 10\"x5  Hooklying: shoulder flexion with dowel, emphasis on trunk elongation 10x  Standing: shoulder flex wall slides, emphasis on trunk elongation 10x2 Supine: HS stretch with strap 20\"x5  Supine: piriformis stretch 20\"x5  Hooklying: LTR 20x  Standing: shoulder flex wall slides, emphasis on trunk elongation 10x2 Hooklying: BKFO 20x  Supine: HS stretch 20\"x5 B  Supine: piriformis stretch 20\"x5 B  Hooklying: LTR 20x  Hooklying: SKTC stretch 20\"x5 B  Standing; hip flexor stretch 20\"x5 B Hooklying: LTR 10x  Supine: fig 4 piriformis stretch 20\"x5 B  Supine: hip flex SLR 10x2 B   Sitting; thoracolumbar ext 10x  Sitting: thoracic rot 10x  Standing: gastroc/hip flex stretch 20\"x5 B   Neuro Re-Education Supine Diaphragmatic breathing with PF lengthening  Supine: Diaphragmatic breathing (cueing to expand through ribs on inhalation) with TrA contactions 10x  Sit to/from stand with breathing/utilizing Knack Supine Diaphragmatic breathing with PF lengthening and contraction  Supine: Diaphragmatic breathing (cueing to expand through ribs on inhalation) with TrA contactions 10x  Hooklying: bridging with hip add at ball with breathing 20x  Child's pose with diaphragmatic breathing x 10 breathes  Squats with diaphragmatic breathing 10x2 Hooklying: diaphragmatic breathing with PF lengthening and contraction (10\" hold) x10 reps  Hooklying: bridging with hip add at ball 20x  Hooklying: bridging with hip abd at green tband 20x  Quadruped child's pose with  diaphragmatic breathing x 10 breaths  Cat/cow with diaphragmatic breathing 10x  Squats with 20# kettle bell 10x2 Hooklying: diaphragmatic breathing with PF lengthening and contraction (10\" hold) x10 reps  Hooklying; Bridging with chest press 8#: 10x2  Sitting: slouch/overcorrect with breathing 10x1  Quadruped cat/cow with breathing 10x1  Child's pose with breathing x 10 breaths  Squats with kettle bell 20#: 10x2   Manual Therapy Supine Abdominal massage and gentle scar mob; stm at B diaphragm Supine Abdominal massage and gentle scar mob; stm at B diaphragm     Therapeutic Activity  Pt education - advised pt to reduce pad wearing, particularly when he is at home to reduce dependency on pads and to increased mindfulness     Therapeutic Exercise Minutes 23 10 23 23   Neuro Re-Educ Minutes 10 23 23 23   Manual Therapy Minutes 14 8     Therapeutic Activity Minutes  3     Total Time Of Timed Procedures 47 44 46 46   Total Time Of Service-Based Procedures 0 0 0 0   Total Treatment Time 47 44 46 46   HEP   Access Code: [email or web address]  URL: [email or web address]  Date: 04/07/2025  Prepared by: Aide Reyes    Exercises  - Diaphragmatic Breathing in Child's Pose with Pelvic Floor Relaxation  - 1 x daily - 7 x weekly - 3 sets - 10 reps  - Supine Pelvic Floor Contraction  - 1 x daily - 7 x weekly - 3 sets - 10 reps  - Supine Bridge with Pelvic Floor Contraction  - 1 x daily - 7 x weekly - 3 sets - 10 reps  - Supine Diaphragmatic Breathing with Pelvic Floor Lengthening  - 1 x daily - 7 x weekly - 3 sets - 10 reps  - Supine Figure 4 Piriformis Stretch  - 1 x daily - 7 x weekly - 3 sets - 10 reps  - Cat Cow to Child's Pose  - 1 x daily - 7 x weekly - 3 sets - 10 reps         HEP  Access Code: [email or web address]  URL: [email or web address]  Date: 04/07/2025  Prepared by: Aide Reyes    Exercises  - Diaphragmatic Breathing in Child's Pose with Pelvic Floor Relaxation  - 1 x daily - 7 x weekly - 3  sets - 10 reps  - Supine Pelvic Floor Contraction  - 1 x daily - 7 x weekly - 3 sets - 10 reps  - Supine Bridge with Pelvic Floor Contraction  - 1 x daily - 7 x weekly - 3 sets - 10 reps  - Supine Diaphragmatic Breathing with Pelvic Floor Lengthening  - 1 x daily - 7 x weekly - 3 sets - 10 reps  - Supine Figure 4 Piriformis Stretch  - 1 x daily - 7 x weekly - 3 sets - 10 reps  - Cat Cow to Child's Pose  - 1 x daily - 7 x weekly - 3 sets - 10 reps    Charges  2 TE, 2 NMR

## 2025-04-21 ENCOUNTER — OFFICE VISIT (OUTPATIENT)
Dept: PHYSICAL THERAPY | Age: 65
End: 2025-04-21
Payer: COMMERCIAL

## 2025-04-21 PROCEDURE — 97110 THERAPEUTIC EXERCISES: CPT

## 2025-04-21 PROCEDURE — 97112 NEUROMUSCULAR REEDUCATION: CPT

## 2025-04-21 PROCEDURE — 97530 THERAPEUTIC ACTIVITIES: CPT

## 2025-04-21 NOTE — PROGRESS NOTES
Patient: Mario Loera (65 year old, male) Referring Provider:  Insurance:   Diagnosis:   Eulalia Allison  [employer]   Date of Surgery: No data recorded Next MD visit:  N/A   Precautions:  Cancer No data recorded Referral Information:    Date of Evaluation: Req: 0, Auth: 0, Exp:     No data recorded POC Auth Visits:  12        Today's Date   4/21/2025    Subjective  pt reports that he is leaking less. He still leaks at about 3x/day with reaching OH but with less amount of leaking.       Pain: 0/10     Objective       See tx table        Assessment  pt education provided on posture when working to minimize slouched posture when cooking due to increased IAP that may be contributing to leakage when he transitions to upright standing/reaching OH. tx focused on ther ex and activities to work on postural control while managing resistance. No provocation of leakage or urge sensation was produced.    Goals (to be met in 12 visits)       Goals         Therapy Goals         Not Met Progress Toward Partially Met Met   Patient will improve PERF score to at least 5/10/10//10 for improved pelvic floor function and pelvic organ support. []  [x]  []  []    Patient will report decreased urinary frequency to </= 1x/day and 0x/night indicating normalizing micturition reflex for improved bladder health and function. []  [x]  []  []    Patient will report ability to postpone urination for at least 30 mins after initial urge presents for improved ability to participate in community outings without fear of UI. []  []  []  [x]    Patient will report use of KNACK contraction at least 75% of the time to re-establish cough reflex and mitigate urinary leakage with increases in intra-abdominal pressure. []  []  []  [x]    Patient will report adherence to HEP for continued exercise benefits following cessation of PT. []  [x]  []  []       Patient will demonstrate PFM lengthening WNL with coordinated diaphragmatic breathing x 10 reps to  alleviate PFM pain and muscle tension. []  [x]  []  []                              Plan  Focus on ROM and progressive strengthening.    Treatment Last 4 Visits  Treatment Day: 8       3/31/2025 4/7/2025 4/14/2025 4/21/2025   Pelvic Treatment   Therapeutic Exercise Supine: HS stretch with strap 20\"x5  Supine: piriformis stretch 20\"x5  Hooklying: LTR 20x  Standing: shoulder flex wall slides, emphasis on trunk elongation 10x2 Hooklying: BKFO 20x  Supine: HS stretch 20\"x5 B  Supine: piriformis stretch 20\"x5 B  Hooklying: LTR 20x  Hooklying: SKTC stretch 20\"x5 B  Standing; hip flexor stretch 20\"x5 B Hooklying: LTR 10x  Supine: fig 4 piriformis stretch 20\"x5 B  Supine: hip flex SLR 10x2 B   Sitting; thoracolumbar ext 10x  Sitting: thoracic rot 10x  Standing: gastroc/hip flex stretch 20\"x5 B Hooklying: serratus press 8# 10x2  Hooklying: chest flies 8# 10x2  Hooklying: shoulder flex 3#: 10x2  Hooklying: bridging 4# 10x2  Quadruped Cat/cow 10x  Quadruped Threading the needle 10x  Standing: scap retraction red tband 20x2   Neuro Re-Education Supine Diaphragmatic breathing with PF lengthening and contraction  Supine: Diaphragmatic breathing (cueing to expand through ribs on inhalation) with TrA contactions 10x  Hooklying: bridging with hip add at ball with breathing 20x  Child's pose with diaphragmatic breathing x 10 breathes  Squats with diaphragmatic breathing 10x2 Hooklying: diaphragmatic breathing with PF lengthening and contraction (10\" hold) x10 reps  Hooklying: bridging with hip add at ball 20x  Hooklying: bridging with hip abd at green tband 20x  Quadruped child's pose with diaphragmatic breathing x 10 breaths  Cat/cow with diaphragmatic breathing 10x  Squats with 20# kettle bell 10x2 Hooklying: diaphragmatic breathing with PF lengthening and contraction (10\" hold) x10 reps  Hooklying; Bridging with chest press 8#: 10x2  Sitting: slouch/overcorrect with breathing 10x1  Quadruped cat/cow with breathing 10x1  Child's  pose with breathing x 10 breaths  Squats with kettle bell 20#: 10x2 Hooklying: isometric hold serratus press 5# with alt march: 10x2  Standing: bicep curls red tband with breathing 20x2  Standing; OH press red tband with breathing 10x2  Standing; chest pull red tband 20x  Standing: diagonal chest pull red tband 10 x ea dir   Manual Therapy Supine Abdominal massage and gentle scar mob; stm at B diaphragm      Therapeutic Activity Pt education - advised pt to reduce pad wearing, particularly when he is at home to reduce dependency on pads and to increased mindfulness   Pt education - posture correction in sitting and standing. Educated pt on standing posture to encourage a more neutral spine throughout most of his work shift and he has the tendency to slouch in standing and prolonged standing may contribute to increased IAP when he transition to upright standing/reaching OH.   Therapeutic Exercise Minutes 10 23 23 16   Neuro Re-Educ Minutes 23 23 25 23   Manual Therapy Minutes 8      Therapeutic Activity Minutes 3   8   Total Time Of Timed Procedures 44 46 48 47   Total Time Of Service-Based Procedures 0 0 0 0   Total Treatment Time 44 46 48 47   HEP  Access Code: [email or web address]  URL: [email or web address]  Date: 04/07/2025  Prepared by: Aide Reyes    Exercises  - Diaphragmatic Breathing in Child's Pose with Pelvic Floor Relaxation  - 1 x daily - 7 x weekly - 3 sets - 10 reps  - Supine Pelvic Floor Contraction  - 1 x daily - 7 x weekly - 3 sets - 10 reps  - Supine Bridge with Pelvic Floor Contraction  - 1 x daily - 7 x weekly - 3 sets - 10 reps  - Supine Diaphragmatic Breathing with Pelvic Floor Lengthening  - 1 x daily - 7 x weekly - 3 sets - 10 reps  - Supine Figure 4 Piriformis Stretch  - 1 x daily - 7 x weekly - 3 sets - 10 reps  - Cat Cow to Child's Pose  - 1 x daily - 7 x weekly - 3 sets - 10 reps  Access Code: [email or web address]  URL: [email or web address]  Date:  04/21/2025  Prepared by: Aide Reyes    Exercises  - Standing Row with Anchored Resistance  - 1 x daily - 7 x weekly - 3 sets - 10 reps  - Standing Overhead Press with Dumbbells at Wall  - 1 x daily - 7 x weekly - 3 sets - 10 reps  - Standing Shoulder Horizontal Abduction with Resistance  - 1 x daily - 7 x weekly - 3 sets - 10 reps        HEP  Access Code: [email or web address]  URL: [email or web address]  Date: 04/21/2025  Prepared by: Aide Reyes    Exercises  - Standing Row with Anchored Resistance  - 1 x daily - 7 x weekly - 3 sets - 10 reps  - Standing Overhead Press with Dumbbells at Wall  - 1 x daily - 7 x weekly - 3 sets - 10 reps  - Standing Shoulder Horizontal Abduction with Resistance  - 1 x daily - 7 x weekly - 3 sets - 10 reps    Charges  1 TE, 2 NMR, 1 TA

## 2025-04-25 NOTE — TELEPHONE ENCOUNTER
Please review.  Protocol failed/has no protocol    Last Office Visit: 11/05/2024  Requested Prescriptions     Pending Prescriptions Disp Refills    metFORMIN HCl 1000 MG Oral Tab [Pharmacy Med Name: GLUCOPHAGE TAB 1000MG] 180 tablet 0     Sig: TAKE 1 TABLET (1,000 MG TOTAL) BY MOUTH 2 (TWO) TIMES A DAY WITH MEALS. (NEED APPOINTMENT)

## 2025-04-28 ENCOUNTER — OFFICE VISIT (OUTPATIENT)
Dept: PHYSICAL THERAPY | Age: 65
End: 2025-04-28
Payer: COMMERCIAL

## 2025-04-28 ENCOUNTER — TELEPHONE (OUTPATIENT)
Dept: PHYSICAL THERAPY | Age: 65
End: 2025-04-28

## 2025-04-28 PROCEDURE — 97110 THERAPEUTIC EXERCISES: CPT

## 2025-04-28 PROCEDURE — 97112 NEUROMUSCULAR REEDUCATION: CPT

## 2025-04-28 NOTE — PROGRESS NOTES
Patient: Mario Loera (65 year old, male) Referring Provider:  Insurance:   Diagnosis:   Eulalia Allison  [employer]   Date of Surgery: No data recorded Next MD visit:  N/A   Precautions:  Cancer No data recorded Referral Information:    Date of Evaluation: Req: 0, Auth: 0, Exp:     No data recorded POC Auth Visits:  12        Today's Date   4/28/2025    Subjective  pt reports that each weak he is having less leakage. He has leakage still with overhead lifting and noticed with coughing. He wears pads 3x/day, but sometimes they are dry.       Pain: 0/10     Objective  see tx table            Assessment  Continued to work on progressive postural strengthening with loading and spinal/hip mobility exercises to improve management of IAP. No provocation of leakage.    Goals (to be met in 12 visits)       Goals         Therapy Goals         Not Met Progress Toward Partially Met Met   Patient will improve PERF score to at least 5/10/10//10 for improved pelvic floor function and pelvic organ support. []  [x]  []  []    Patient will report decreased urinary frequency to </= 1x/day and 0x/night indicating normalizing micturition reflex for improved bladder health and function. []  [x]  []  []    Patient will report ability to postpone urination for at least 30 mins after initial urge presents for improved ability to participate in community outings without fear of UI. []  []  []  [x]    Patient will report use of KNACK contraction at least 75% of the time to re-establish cough reflex and mitigate urinary leakage with increases in intra-abdominal pressure. []  []  []  [x]    Patient will report adherence to HEP for continued exercise benefits following cessation of PT. []  [x]  []  []       Patient will demonstrate PFM lengthening WNL with coordinated diaphragmatic breathing x 10 reps to alleviate PFM pain and muscle tension. []  [x]  []  []                                  Plan  Focus on ROM and progressive  strengthening.    Treatment Last 4 Visits  Treatment Day: 9       4/7/2025 4/14/2025 4/21/2025 4/28/2025   Pelvic Treatment   Therapeutic Exercise Hooklying: BKFO 20x  Supine: HS stretch 20\"x5 B  Supine: piriformis stretch 20\"x5 B  Hooklying: LTR 20x  Hooklying: SKTC stretch 20\"x5 B  Standing; hip flexor stretch 20\"x5 B Hooklying: LTR 10x  Supine: fig 4 piriformis stretch 20\"x5 B  Supine: hip flex SLR 10x2 B   Sitting; thoracolumbar ext 10x  Sitting: thoracic rot 10x  Standing: gastroc/hip flex stretch 20\"x5 B Hooklying: serratus press 8# 10x2  Hooklying: chest flies 8# 10x2  Hooklying: shoulder flex 3#: 10x2  Hooklying: bridging 4# 10x2  Quadruped Cat/cow 10x  Quadruped Threading the needle 10x  Standing: scap retraction red tband 20x2 Hooklying: LTR 10x  Sitting: trunk rot 10x  Sitting: thoracolumbar ext 10x  Standing: OH press 10# hold in B hands: 10x2  Standing: hip flexor/gastroc stretch 20\"x5 B  Standing: scap retraction green tband 20x  Squats with 20# kettle bell: 10x2   Neuro Re-Education Hooklying: diaphragmatic breathing with PF lengthening and contraction (10\" hold) x10 reps  Hooklying: bridging with hip add at ball 20x  Hooklying: bridging with hip abd at green tband 20x  Quadruped child's pose with diaphragmatic breathing x 10 breaths  Cat/cow with diaphragmatic breathing 10x  Squats with 20# kettle bell 10x2 Hooklying: diaphragmatic breathing with PF lengthening and contraction (10\" hold) x10 reps  Hooklying; Bridging with chest press 8#: 10x2  Sitting: slouch/overcorrect with breathing 10x1  Quadruped cat/cow with breathing 10x1  Child's pose with breathing x 10 breaths  Squats with kettle bell 20#: 10x2 Hooklying: isometric hold serratus press 5# with alt march: 10x2  Standing: bicep curls red tband with breathing 20x2  Standing; OH press red tband with breathing 10x2  Standing; chest pull red tband 20x  Standing: diagonal chest pull red tband 10 x ea dir Hooklying: bridge with serratus press  with  breathing 10#: 10x2  Standing: bicep curls green tband with breathing 10x2  Standing; OH press green tband with breathing 10x2  Standing; chest pull green tband with breathing 20x  Standing: diagonal chest pull green tband with breathing 10 x ea dir  High knee march with 10# over shoulders: 20' x 1 lap  [place] walk with 16# (32# total): 40' x 2 laps   Therapeutic Activity   Pt education - posture correction in sitting and standing. Educated pt on standing posture to encourage a more neutral spine throughout most of his work shift and he has the tendency to slouch in standing and prolonged standing may contribute to increased IAP when he transition to upright standing/reaching OH.    Therapeutic Exercise Minutes 23 23 16 23   Neuro Re-Educ Minutes 23 25 23 23   Therapeutic Activity Minutes   8    Total Time Of Timed Procedures 46 48 47 46   Total Time Of Service-Based Procedures 0 0 0 0   Total Treatment Time 46 48 47 46   HEP Access Code: [email or web address]  URL: [email or web address]  Date: 04/07/2025  Prepared by: Aide Reyes    Exercises  - Diaphragmatic Breathing in Child's Pose with Pelvic Floor Relaxation  - 1 x daily - 7 x weekly - 3 sets - 10 reps  - Supine Pelvic Floor Contraction  - 1 x daily - 7 x weekly - 3 sets - 10 reps  - Supine Bridge with Pelvic Floor Contraction  - 1 x daily - 7 x weekly - 3 sets - 10 reps  - Supine Diaphragmatic Breathing with Pelvic Floor Lengthening  - 1 x daily - 7 x weekly - 3 sets - 10 reps  - Supine Figure 4 Piriformis Stretch  - 1 x daily - 7 x weekly - 3 sets - 10 reps  - Cat Cow to Child's Pose  - 1 x daily - 7 x weekly - 3 sets - 10 reps  Access Code: [email or web address]  URL: [email or web address]  Date: 04/21/2025  Prepared by: Aide Reyes    Exercises  - Standing Row with Anchored Resistance  - 1 x daily - 7 x weekly - 3 sets - 10 reps  - Standing Overhead Press with Dumbbells at Wall  - 1 x daily - 7 x weekly - 3 sets - 10 reps  -  Standing Shoulder Horizontal Abduction with Resistance  - 1 x daily - 7 x weekly - 3 sets - 10 reps         HEP  Access Code: [email or web address]  URL: [email or web address]  Date: 04/21/2025  Prepared by: Aide Reyes    Exercises  - Standing Row with Anchored Resistance  - 1 x daily - 7 x weekly - 3 sets - 10 reps  - Standing Overhead Press with Dumbbells at Wall  - 1 x daily - 7 x weekly - 3 sets - 10 reps  - Standing Shoulder Horizontal Abduction with Resistance  - 1 x daily - 7 x weekly - 3 sets - 10 reps    Charges  2 TE, 2 NMR

## 2025-05-05 ENCOUNTER — OFFICE VISIT (OUTPATIENT)
Dept: PHYSICAL THERAPY | Age: 65
End: 2025-05-05
Payer: COMMERCIAL

## 2025-05-05 PROCEDURE — 97112 NEUROMUSCULAR REEDUCATION: CPT

## 2025-05-05 PROCEDURE — 97110 THERAPEUTIC EXERCISES: CPT

## 2025-05-05 NOTE — PROGRESS NOTES
Patient: Mario Loera (65 year old, male) Referring Provider:  Insurance:   Diagnosis:   Eulalia Allison  [employer]   Date of Surgery: No data recorded Next MD visit:  N/A   Precautions:  Cancer 6/2025 Referral Information:    Date of Evaluation: Req: 0, Auth: 0, Exp:     No data recorded POC Auth Visits:  12        Today's Date   5/5/2025    Subjective  pt still has leakage with sneezing and when he stretching overhead. He has no leakage at night time. He still wears 3 pads per day and most times they are not saturated and dry.       Pain: 0/10     Objective          See tx table     Assessment  Progressed strengthening of posterior chain and stretching of abodminals/hip flexors without provocation of leakage.    Goals (to be met in 12 visits)       Goals         Therapy Goals         Not Met Progress Toward Partially Met Met   Patient will improve PERF score to at least 5/10/10//10 for improved pelvic floor function and pelvic organ support. []  [x]  []  []    Patient will report decreased urinary frequency to </= 1x/day and 0x/night indicating normalizing micturition reflex for improved bladder health and function. []  []  [x]  []    Patient will report ability to postpone urination for at least 30 mins after initial urge presents for improved ability to participate in community outings without fear of UI. []  []  []  [x]    Patient will report use of KNACK contraction at least 75% of the time to re-establish cough reflex and mitigate urinary leakage with increases in intra-abdominal pressure. []  []  []  [x]    Patient will report adherence to HEP for continued exercise benefits following cessation of PT. []  []  [x]  []       Patient will demonstrate PFM lengthening WNL with coordinated diaphragmatic breathing x 10 reps to alleviate PFM pain and muscle tension. []  []  []  [x]                    Plan  Focus on ROM and progressive strengthening. Plan for discharge from PT after 2 more visits. pt to return in  1 month prior to f/u with oncologist.    Treatment Last 4 Visits  Treatment Day: 10       4/14/2025 4/21/2025 4/28/2025 5/5/2025   Pelvic Treatment   Therapeutic Exercise Hooklying: LTR 10x  Supine: fig 4 piriformis stretch 20\"x5 B  Supine: hip flex SLR 10x2 B   Sitting; thoracolumbar ext 10x  Sitting: thoracic rot 10x  Standing: gastroc/hip flex stretch 20\"x5 B Hooklying: serratus press 8# 10x2  Hooklying: chest flies 8# 10x2  Hooklying: shoulder flex 3#: 10x2  Hooklying: bridging 4# 10x2  Quadruped Cat/cow 10x  Quadruped Threading the needle 10x  Standing: scap retraction red tband 20x2 Hooklying: LTR 10x  Sitting: trunk rot 10x  Sitting: thoracolumbar ext 10x  Standing: OH press 10# hold in B hands: 10x2  Standing: hip flexor/gastroc stretch 20\"x5 B  Standing: scap retraction green tband 20x  Squats with 20# kettle bell: 10x2 Supine: DKTC stretch 10x5\"  Hooklying: fig 4 piriformis stretch 3x20\"  LEENA stretch 10x10\"  Prone: HS curls 20x2\" B  Prone: hip windshield wipers (hip IR/ER): 10x2 B  Standing: lumbar ext 10x   Neuro Re-Education Hooklying: diaphragmatic breathing with PF lengthening and contraction (10\" hold) x10 reps  Hooklying; Bridging with chest press 8#: 10x2  Sitting: slouch/overcorrect with breathing 10x1  Quadruped cat/cow with breathing 10x1  Child's pose with breathing x 10 breaths  Squats with kettle bell 20#: 10x2 Hooklying: isometric hold serratus press 5# with alt march: 10x2  Standing: bicep curls red tband with breathing 20x2  Standing; OH press red tband with breathing 10x2  Standing; chest pull red tband 20x  Standing: diagonal chest pull red tband 10 x ea dir Hooklying: bridge with serratus press  with breathing 10#: 10x2  Standing: bicep curls green tband with breathing 10x2  Standing; OH press green tband with breathing 10x2  Standing; chest pull green tband with breathing 20x  Standing: diagonal chest pull green tband with breathing 10 x ea dir  High knee march with 10# over shoulders:  20' x 1 lap  [place] walk with 16# (32# total): 40' x 2 laps Prone: glut sets with breathing and TrA contraction 20x5\"  Prone: hip ext SLR with breathing (pillow under abdomen) 10x2 B  High knee march with 10# over shoulders: 20' x 1 lap  [place] walk with 16# (32# total): 40' x 2 laps   Therapeutic Activity  Pt education - posture correction in sitting and standing. Educated pt on standing posture to encourage a more neutral spine throughout most of his work shift and he has the tendency to slouch in standing and prolonged standing may contribute to increased IAP when he transition to upright standing/reaching OH.     Therapeutic Exercise Minutes 23 16 23 23   Neuro Re-Educ Minutes 25 23 23 25   Therapeutic Activity Minutes  8     Total Time Of Timed Procedures 48 47 46 48   Total Time Of Service-Based Procedures 0 0 0 0   Total Treatment Time 48 47 46 48   HEP  Access Code: [email or web address]  URL: [email or web address]  Date: 04/21/2025  Prepared by: Aide Reyes    Exercises  - Standing Row with Anchored Resistance  - 1 x daily - 7 x weekly - 3 sets - 10 reps  - Standing Overhead Press with Dumbbells at Wall  - 1 x daily - 7 x weekly - 3 sets - 10 reps  - Standing Shoulder Horizontal Abduction with Resistance  - 1 x daily - 7 x weekly - 3 sets - 10 reps  Access Code: [email or web address]  URL: [email or web address]  Date: 05/05/2025  Prepared by: Aide Reyes    Exercises  - Prone Press Up On Elbows  - 1 x daily - 7 x weekly - 10 reps - 10 seconds hold  - Prone Gluteal Sets  - 1 x daily - 7 x weekly - 2 sets - 10 reps  - Prone Hip Extension with Pillow Under Abdomen  - 1 x daily - 7 x weekly - 2 sets - 10 reps  - Prone Hip Internal and External Rotation AROM  - 2 x daily - 7 x weekly - 2 sets - 10 reps        HEP  Access Code: [email or web address]  URL: [email or web address]  Date: 05/05/2025  Prepared by: Aide Reyes    Exercises  - Prone Press Up On Elbows  - 1 x daily -  7 x weekly - 10 reps - 10 seconds hold  - Prone Gluteal Sets  - 1 x daily - 7 x weekly - 2 sets - 10 reps  - Prone Hip Extension with Pillow Under Abdomen  - 1 x daily - 7 x weekly - 2 sets - 10 reps  - Prone Hip Internal and External Rotation AROM  - 2 x daily - 7 x weekly - 2 sets - 10 reps    Charges  2 TE, 2 NMR

## 2025-06-02 ENCOUNTER — OFFICE VISIT (OUTPATIENT)
Dept: PHYSICAL THERAPY | Age: 65
End: 2025-06-02
Payer: COMMERCIAL

## 2025-06-02 PROCEDURE — 97140 MANUAL THERAPY 1/> REGIONS: CPT

## 2025-06-02 PROCEDURE — 97110 THERAPEUTIC EXERCISES: CPT

## 2025-06-02 PROCEDURE — 97112 NEUROMUSCULAR REEDUCATION: CPT

## 2025-06-02 NOTE — PROGRESS NOTES
Patient: Mario Loera (65 year old, male) Referring Provider:  Insurance:   Diagnosis:   Eulalia Allison  [employer] INS   Date of Surgery: No data recorded Next MD visit:  N/A   Precautions:  Cancer 6/2025 Referral Information:    Date of Evaluation: Req: 0, Auth: 0, Exp:     No data recorded POC Auth Visits:  12        Today's Date   6/2/2025    Subjective  pt reports that the leakage has improved but he still has to change his pads at least 2x/day. He still has urinary leakage with sneezing and with overhead leakage. Her reports that the volume of leakage is small and getting less over time       Pain: 0/10     Objective  see tx table            Assessment  Continued to work on strengthening posterior chain and stretching of abodminals and hips to improve tissue extensibility, strength and muscle tone. He had no provocation of leakage or c/o urge.    Goals (to be met in 12 visits)       Goals         Therapy Goals         Not Met Progress Toward Partially Met Met   Patient will improve PERF score to at least 5/10/10//10 for improved pelvic floor function and pelvic organ support. []  [x]  []  []    Patient will report decreased urinary frequency to </= 1x/day and 0x/night indicating normalizing micturition reflex for improved bladder health and function. []  []  [x]  []    Patient will report ability to postpone urination for at least 30 mins after initial urge presents for improved ability to participate in community outings without fear of UI. []  []  []  [x]    Patient will report use of KNACK contraction at least 75% of the time to re-establish cough reflex and mitigate urinary leakage with increases in intra-abdominal pressure. []  []  []  [x]    Patient will report adherence to HEP for continued exercise benefits following cessation of PT. []  []  [x]  []       Patient will demonstrate PFM lengthening WNL with coordinated diaphragmatic breathing x 10 reps to alleviate PFM pain and muscle tension. []  []  []   [x]                        Plan  Continue with ROM and strengthening. Plan to discharge after 1 more visit as discussed with pt.    Treatment Last 4 Visits  Treatment Day: 11 4/21/2025 4/28/2025 5/5/2025 6/2/2025   Pelvic Treatment   Therapeutic Exercise Hooklying: serratus press 8# 10x2  Hooklying: chest flies 8# 10x2  Hooklying: shoulder flex 3#: 10x2  Hooklying: bridging 4# 10x2  Quadruped Cat/cow 10x  Quadruped Threading the needle 10x  Standing: scap retraction red tband 20x2 Hooklying: LTR 10x  Sitting: trunk rot 10x  Sitting: thoracolumbar ext 10x  Standing: OH press 10# hold in B hands: 10x2  Standing: hip flexor/gastroc stretch 20\"x5 B  Standing: scap retraction green tband 20x  Squats with 20# kettle bell: 10x2 Supine: DKTC stretch 10x5\"  Hooklying: fig 4 piriformis stretch 3x20\"  LENEA stretch 10x10\"  Prone: HS curls 20x2\" B  Prone: hip windshield wipers (hip IR/ER): 10x2 B  Standing: lumbar ext 10x Prone press ups: 10x2  Prone quad stretch 20\"x5 B  Prone: HS curls 20x2\" hold B  Prone hip IR/ER windshield wiper 10x2 B  Quadruped threading the needle: 10xB   Neuro Re-Education Hooklying: isometric hold serratus press 5# with alt march: 10x2  Standing: bicep curls red tband with breathing 20x2  Standing; OH press red tband with breathing 10x2  Standing; chest pull red tband 20x  Standing: diagonal chest pull red tband 10 x ea dir Hooklying: bridge with serratus press  with breathing 10#: 10x2  Standing: bicep curls green tband with breathing 10x2  Standing; OH press green tband with breathing 10x2  Standing; chest pull green tband with breathing 20x  Standing: diagonal chest pull green tband with breathing 10 x ea dir  High knee march with 10# over shoulders: 20' x 1 lap  [place] walk with 16# (32# total): 40' x 2 laps Prone: glut sets with breathing and TrA contraction 20x5\"  Prone: hip ext SLR with breathing (pillow under abdomen) 10x2 B  High knee march with 10# over shoulders: 20' x 1 lap  [place]  walk with 16# (32# total): 40' x 2 laps Prone: hip ext SLR with pillow under abdomen: 10x2 B  Child's pose with breathing emphasis on spinal elongation, PF lengthening, and shoulder flex stretch x 10 breaths  Hooklying: PF and TrA contractions: 10x2  OH press with 23# versa bar: 10xB   Manual Therapy    Abdmoninal massage  Stm at B hip adductors   Therapeutic Activity Pt education - posture correction in sitting and standing. Educated pt on standing posture to encourage a more neutral spine throughout most of his work shift and he has the tendency to slouch in standing and prolonged standing may contribute to increased IAP when he transition to upright standing/reaching OH.      Therapeutic Exercise Minutes 16 23 23 14   Neuro Re-Educ Minutes 23 23 25 23   Manual Therapy Minutes    8   Therapeutic Activity Minutes 8      Total Time Of Timed Procedures 47 46 48 45   Total Time Of Service-Based Procedures 0 0 0 0   Total Treatment Time 47 46 48 45   HEP Access Code: [email or web address]  URL: [email or web address]  Date: 04/21/2025  Prepared by: Aide Moraakaasaf    Exercises  - Standing Row with Anchored Resistance  - 1 x daily - 7 x weekly - 3 sets - 10 reps  - Standing Overhead Press with Dumbbells at Wall  - 1 x daily - 7 x weekly - 3 sets - 10 reps  - Standing Shoulder Horizontal Abduction with Resistance  - 1 x daily - 7 x weekly - 3 sets - 10 reps  Access Code: [email or web address]  URL: [email or web address]  Date: 05/05/2025  Prepared by: Aide Depakakibo    Exercises  - Prone Press Up On Elbows  - 1 x daily - 7 x weekly - 10 reps - 10 seconds hold  - Prone Gluteal Sets  - 1 x daily - 7 x weekly - 2 sets - 10 reps  - Prone Hip Extension with Pillow Under Abdomen  - 1 x daily - 7 x weekly - 2 sets - 10 reps  - Prone Hip Internal and External Rotation AROM  - 2 x daily - 7 x weekly - 2 sets - 10 reps         HEP  Access Code: [email or web address]  URL: [email or web address]  Date:  05/05/2025  Prepared by: Aide Reyes    Exercises  - Prone Press Up On Elbows  - 1 x daily - 7 x weekly - 10 reps - 10 seconds hold  - Prone Gluteal Sets  - 1 x daily - 7 x weekly - 2 sets - 10 reps  - Prone Hip Extension with Pillow Under Abdomen  - 1 x daily - 7 x weekly - 2 sets - 10 reps  - Prone Hip Internal and External Rotation AROM  - 2 x daily - 7 x weekly - 2 sets - 10 reps    Charges  1 TE, 2 NMR, 1 MT

## 2025-06-11 ENCOUNTER — OFFICE VISIT (OUTPATIENT)
Dept: PHYSICAL THERAPY | Age: 65
End: 2025-06-11
Payer: COMMERCIAL

## 2025-06-11 PROCEDURE — 97110 THERAPEUTIC EXERCISES: CPT

## 2025-06-11 PROCEDURE — 97140 MANUAL THERAPY 1/> REGIONS: CPT

## 2025-06-11 PROCEDURE — 97112 NEUROMUSCULAR REEDUCATION: CPT

## 2025-06-11 NOTE — PROGRESS NOTES
Patient: Mario Loera (65 year old, male) Referring Provider:  Insurance:   Diagnosis:   Eulalia Allison  [employer] INS   Date of Surgery: No data recorded Next MD visit:  N/A   Precautions:  Cancer 6/2025 Referral Information:    Date of Evaluation: Req: 0, Auth: 0, Exp:     No data recorded POC Auth Visits:  12       Discharge Summary  Pt has attended 12 visits in Physical Therapy.       Today's Date   6/11/2025    Subjective  pt reports that he is down to 2 pads per day and has significant less leakage with decrease volume and frequency. He denies leakage at night. pt reports that he has regular bowel movements 1x/day, rating bristol scale at # 3-4. He reports that he does his HEP regularly. He admits that he still has leaking with sneezing and overhead reaching and this is improving.       Pain: 0/10     Objective      PERF: 5/8/8//8          Assessment  Patient has attended 12 PT visit to address his primary concerns of stress and urge urinary incontinence. pt reports no c/o urge incontinence and FABRICIO has improved as pt reports decreased needs to wear pads down to 2x/day with decreased volume of leakage and decreased incidence of leakage that sometimes occurs with sneezing and overhead lifing. His pelvic floor muscle tone has improved to grossly minimal restrictions and he is able to coordinate PF contractions/lengthening with breathing without compensatory activation of accessory muscles. PERF strength has improved and pt will continue to work on fast speed contractions and knack that will help in reducing leakage with sneezing and overhead reaching. pt has no c/o pain. Goals have been met or partially met.    Goals (to be met in 12 visits)       Goals         Therapy Goals         Not Met Progress Toward Partially Met Met   Patient will improve PERF score to at least 5/10/10//10 for improved pelvic floor function and pelvic organ support. []  []  [x]  []    Patient will report decreased urinary frequency to  </= 1x/day and 0x/night indicating normalizing micturition reflex for improved bladder health and function. []  []  [x]  []    Patient will report ability to postpone urination for at least 30 mins after initial urge presents for improved ability to participate in community outings without fear of UI. []  []  []  [x]    Patient will report use of KNACK contraction at least 75% of the time to re-establish cough reflex and mitigate urinary leakage with increases in intra-abdominal pressure. []  []  []  [x]    Patient will report adherence to HEP for continued exercise benefits following cessation of PT. []  []  [x]  [x]       Patient will demonstrate PFM lengthening WNL with coordinated diaphragmatic breathing x 10 reps to alleviate PFM pain and muscle tension. []  []  []  [x]                        Plan       NIH-CPSI total score = 7 (QOL score = 7)    Plan: Discharge from physical therapy and continue with HEP. Patient advised to call PT or referring physician with questions or concerns.     Patient/Family/Caregiver was advised of these findings, precautions, and treatment options and has agreed to actively participate in planning and for this course of care.    Thank you for your referral. If you have any questions, please contact me at Dept: [phone].    Sincerely,  Electronically signed by therapist: Aide Reyes PT     Physician's certification required:  No         Treatment Last 4 Visits  Treatment Day: 12 4/28/2025 5/5/2025 6/2/2025 6/11/2025   Pelvic Treatment   Therapeutic Exercise Hooklying: LTR 10x  Sitting: trunk rot 10x  Sitting: thoracolumbar ext 10x  Standing: OH press 10# hold in B hands: 10x2  Standing: hip flexor/gastroc stretch 20\"x5 B  Standing: scap retraction green tband 20x  Squats with 20# kettle bell: 10x2 Supine: DKTC stretch 10x5\"  Hooklying: fig 4 piriformis stretch 3x20\"  LEENA stretch 10x10\"  Prone: HS curls 20x2\" B  Prone: hip windshield wipers (hip IR/ER): 10x2  B  Standing: lumbar ext 10x Prone press ups: 10x2  Prone quad stretch 20\"x5 B  Prone: HS curls 20x2\" hold B  Prone hip IR/ER windshield wiper 10x2 B  Quadruped threading the needle: 10xB Hooklying: BKFO 20x  Hooklying: LTR 20x  Hooklying: bridging 20x   Neuro Re-Education Hooklying: bridge with serratus press  with breathing 10#: 10x2  Standing: bicep curls green tband with breathing 10x2  Standing; OH press green tband with breathing 10x2  Standing; chest pull green tband with breathing 20x  Standing: diagonal chest pull green tband with breathing 10 x ea dir  High knee march with 10# over shoulders: 20' x 1 lap  [place] walk with 16# (32# total): 40' x 2 laps Prone: glut sets with breathing and TrA contraction 20x5\"  Prone: hip ext SLR with breathing (pillow under abdomen) 10x2 B  High knee march with 10# over shoulders: 20' x 1 lap  [place] walk with 16# (32# total): 40' x 2 laps Prone: hip ext SLR with pillow under abdomen: 10x2 B  Child's pose with breathing emphasis on spinal elongation, PF lengthening, and shoulder flex stretch x 10 breaths  Hooklying: PF and TrA contractions: 10x2  OH press with 23# versa bar: 10xB Sidelying: PF coordination L 2 with internal tactile cueing x5 fast x 3 sets  Sidelying: PF coordination L 3 with internal tactile cueing x 3 reps each  Hooklying: diaphragmatic breathing with PF lengthening and contractions 10x  Hookying: PF coordination L 2 x5 fast x 10 reps  Hooklying: PF coordination L3 x 3 reps each   Manual Therapy   Abdmoninal massage  Stm at B hip adductors Pelvic internal exam performed - muscle tone grossly with minimal restrictions (no pain)  Gentle stm at levator ani and OI     Therapeutic Exercise Minutes 23 23 14 15   Neuro Re-Educ Minutes 23 25 23 23   Manual Therapy Minutes   8 8   Total Time Of Timed Procedures 46 48 45 46   Total Time Of Service-Based Procedures 0 0 0 0   Total Treatment Time 46 48 45 46   HEP  Access Code: [email or web address]  URL:  [email or web address]  Date: 05/05/2025  Prepared by: Aide Reyes    Exercises  - Prone Press Up On Elbows  - 1 x daily - 7 x weekly - 10 reps - 10 seconds hold  - Prone Gluteal Sets  - 1 x daily - 7 x weekly - 2 sets - 10 reps  - Prone Hip Extension with Pillow Under Abdomen  - 1 x daily - 7 x weekly - 2 sets - 10 reps  - Prone Hip Internal and External Rotation AROM  - 2 x daily - 7 x weekly - 2 sets - 10 reps  PF coordination Level 2 and 3        HEP  PF coordination Level 2 and 3    Charges  1 TE, 2 NMR, 1 MT

## 2025-06-26 ENCOUNTER — LAB ENCOUNTER (OUTPATIENT)
Dept: LAB | Age: 65
End: 2025-06-26
Payer: COMMERCIAL

## 2025-06-26 DIAGNOSIS — C61 PROSTATE CANCER (HCC): ICD-10-CM

## 2025-06-26 LAB — PSA SERPL-MCNC: 0.04 NG/ML (ref ?–4)

## 2025-06-26 PROCEDURE — 36415 COLL VENOUS BLD VENIPUNCTURE: CPT

## 2025-06-26 PROCEDURE — 84153 ASSAY OF PSA TOTAL: CPT

## 2025-06-29 NOTE — PROGRESS NOTES
[facility] Urologic Oncology  Follow-Up Visit    HPI:   Mario Loera is a 65 year old male here today follow-up. The patient was last seen on 01/27/2025.    The patient is s/p RALP on 12/03/2024 with Dr Turpin.     Recently completed PFPT. Down from 5 briefs per day to 2 pads per day. Still c/o some stress incontinence but improving.    Erections are minimal. He has noticed some increased blood flow at times, but has not achieved an actual erection. He is not currently interested in treatment at this time.     06/26/2025 PSA 0.04 ng/mL    1.  Clinically localized favorable intermediate risk prostate cancer (cT1c cN0 Mx)  Family history of prostate cancer in his father and paternal uncle.    Has been on finasteride for many years for BPH.  PSA from February 2024 elevated at 5.48 ng/mL when corrected for finasteride use.    Seen by Dr. Earl.  Prostate MRI revealing a prostate volume of 33 mL.  PI-RADS 3 lesion in the left peripheral zone apex.  No lymphadenopathy.    He underwent a UroNav MRI-US fusion prostate biopsy on 6/13/2024 which revealed grade group 2 ([place] 3+4 =7) prostate cancer in 1/3 cores from the WERO, with 3% involvement.  12 standard cores negative for malignancy.    He was counseled on his options and presents for an opinion regarding surgery.    He denies any significant baseline LUTS.  His IPSS is 4 with a quality-of-life score of 2.    He denies any significant erectile dysfunction.    No gross hematuria or dysuria.  He has history of nephrolithiasis for which he underwent ureteroscopy with laser lithotripsy in 2022.  Currently denies any flank pain.    12/03/2024: robotic-assisted laparoscopic radical prostatectomy, bilateral pelvic lymph node dissection, laparoscopic lysis of adhesions, and primary repair of recurrent umbilical hernia with Dr Turpin    12/11/2024 F/U: Patient is doing well. Chicas removed. Will obtain PSA in 6 weeks.    01/2025 F/U: PSA undetectable.  Incontinence has minimal improvement with Kegels. Referral for PFPT given. Not interested in treatment for ED at this time.  F/U in 6 months    06/2025 F/U: PSA 0.04 ng/mL. Recently completed PFPT with improvement. Not interested in treatment for ED at this time.  F/U in 3 months      PAST MEDICAL HISTORY: Hypertension.  Hyperlipidemia.  DM.  Nephrolithiasis.  Prostate cancer 6/2024..    PAST SURGICAL HISTORY: Laparoscopic appendectomy and umbilical hernia repair.  Thoracotomy as an infant. RALP 12/2024    SOCIAL HISTORY: Single.  Has 1 son.  No smoking or illicit drug use.  Rare social alcohol.  Works as a [employer] at [employer].     Family History   Problem Relation Age of Onset    Heart Disease Father 69        had [address] and [address] way bypass.    Cancer Father         Prostate    Heart Disease Paternal Aunt     Heart Disease Mother     Cancer Other         Prostate    Cancer Paternal Grandfather         prostate cancer     Allergies: Ciprofloxacin      REVIEW OF SYSTEMS:  Pertinent positives and negatives per HPI. A 12-point ROS was performed and is otherwise negative.       EXAM:  There were no vitals taken for this visit.    Physical Exam  Constitutional:       Appearance: He is well-developed.   HENT:      Head: Normocephalic.   Eyes:      General: No scleral icterus.  Cardiovascular:      Rate and Rhythm: Normal rate.   Pulmonary:      Effort: Pulmonary effort is normal.   Abdominal:      General: Abdomen is flat.      Palpations: Abdomen is soft.      Comments: Incisions are well-healed with edges well-approximated    Skin:     General: Skin is warm and dry.   Neurological:      Mental Status: He is alert and oriented to person, place, and time.   Psychiatric:         Mood and Affect: Mood normal.         Behavior: Behavior normal.       LABS:     PSA Screen   Latest Ref Rng <=4.00    5/18/2016 0.8   1/17/2018 0.8   2/27/2019 0.96    2/8/2020 1.08    9/9/2021 1.58    2/10/2022 1.94    10/4/2022 2.44     7/19/2023 3.61    2/3/2024 2.74    01/18/2025 <0.04   06/26/2025 0.04       PATHOLOGY:    Provider:  Piper Earl MD      Collected:           06/13/2024         Location [place] Score % of Pattern 4  Grade Group   Positive Cores / Total Cores  Tumor Length (mm)  % Tissue Involved       A. Region of interest 3+4 40 2 1/3 1.0 3    B. Right base                C. Right mid                D. Right apex                E. Left base                F. Left mid                G. Left apex                   IMAGING:    MRI Prostate (5/1/24):     1. PI-RADS CLASSIFICATION:  PI-RADS 3 - Intermediate (the presence of clinically significant cancer is equivocal).      2. There is an indeterminate lesion at the left aspect of the prostatic apex. Continued ascertainment of quantitative PSA can be considered with follow-up prostatic MRI in 1 year as part of an active surveillance program.      3. No elevation of PSA density.      4. Negative for discernible intrapelvic lymphadenopathy.       5. No suspicious osseous lesions are identified in the imaged volume.      6. Distal colonic diverticulosis without evidence of MR complication in the imaged volume.      7. Lesser incidental findings as above.         IMPRESSION:  65 year old male with family history of prostate cancer.  Patient diagnosed with clinically localized favorable intermediate risk prostate cancer upon UroNav prostate biopsy performed 6/13/2024 for evaluation of elevated screening PSA levels. Underwent Robotic-assisted laparoscopic radical prostatectomy, bilateral pelvic lymph node dissection, laparoscopic lysis of adhesions, and primary repair of recurrent umbilical hernia with Dr Turpin on 12/03/2024.    Discussed prostate cancer surveillance following definitive local treatment per NCCN guidelines. Will recheck PSA in 3 months to ensure it does not increase.    Patient instructed on the importance of continuing PFPT exercises in the recovery of urinary  control.  Showing improvement.    Oral phosphodiesterase inhibitor therapy discussed with patient, he is not interested at this time.      All questions answered.       PLAN:  - Continue pelvic floor exercises  - PSA in 3 months  - Follow-up in 3 months with PSA a few days prior    VIKTORIYA Hollis  06/30/2025

## 2025-06-30 ENCOUNTER — OFFICE VISIT (OUTPATIENT)
Dept: SURGERY | Facility: CLINIC | Age: 65
End: 2025-06-30

## 2025-06-30 DIAGNOSIS — C61 PROSTATE CANCER (HCC): Primary | ICD-10-CM

## 2025-06-30 PROCEDURE — 99213 OFFICE O/P EST LOW 20 MIN: CPT

## (undated) DEVICE — AIRSEAL TRI-LUMEN LILTERED TUBE SET: Brand: AIRSEAL

## (undated) DEVICE — CANNULA SEAL

## (undated) DEVICE — SUT MCRYL 4-0 27IN ABSRB UD 19MM PS-2 3/8

## (undated) DEVICE — ANTIBACTERIAL UNDYED BRAIDED (POLYGLACTIN 910), SYNTHETIC ABSORBABLE SUTURE: Brand: COATED VICRYL

## (undated) DEVICE — SLEEVE KENDALL SCD EXPRESS MED

## (undated) DEVICE — PAD,EYE,LARGE,2 1/8"X2 5/8",STERILE,LF: Brand: MEDLINE

## (undated) DEVICE — FLEXOR, URETERAL ACCESS SHEATH WITH AQ, HYDROPHILIC COATING: Brand: FLEXOR

## (undated) DEVICE — GAMMEX® PI HYBRID SIZE 6, STERILE POWDER-FREE SURGICAL GLOVE, POLYISOPRENE AND NEOPRENE BLEND: Brand: GAMMEX

## (undated) DEVICE — INSUFFLATION NEEDLE TO ESTABLISH PNEUMOPERITONEUM.: Brand: INSUFFLATION NEEDLE

## (undated) DEVICE — DEVICE STBL AD TRICOT POLY CLS CELL FOAM

## (undated) DEVICE — TIGERTAIL 6F FLXTIP 70CM

## (undated) DEVICE — AIRSEAL 12 MM ACCESS PORT AND PALM GRIP OBTURATOR WITH BLADELESS OPTICAL TIP, 120 MM LENGTH: Brand: AIRSEAL

## (undated) DEVICE — NITINOL STONE RETRIEVAL BASKET: Brand: ZERO TIP

## (undated) DEVICE — PAD POS 36IN DISP SURGYPAD

## (undated) DEVICE — PROGRASP FORCEPS: Brand: ENDOWRIST

## (undated) DEVICE — SNARE CAPTIFLEX MICRO-OVL OLY

## (undated) DEVICE — COLUMN DRAPE

## (undated) DEVICE — ABSORBABLE WOUND CLOSURE DEVICE: Brand: V-LOC 90

## (undated) DEVICE — ABSORBABLE WOUND CLOSURE DEVICE: Brand: V-LOC 180

## (undated) DEVICE — MARYLAND JAW LAPAROSCOPIC SEALER/DIVIDER COATED: Brand: LIGASURE

## (undated) DEVICE — JELLY,LUBE,STERILE,FLIP TOP,TUBE,2-OZ: Brand: MEDLINE

## (undated) DEVICE — LARGE NEEDLE DRIVER: Brand: ENDOWRIST

## (undated) DEVICE — Device: Brand: CUSTOM PROCEDURE KIT

## (undated) DEVICE — ZZ--CONVERTED-TO-500976-PENCIL SMK EVAC L10FT MPLR BLDE JAW OPN

## (undated) DEVICE — FENESTRATED BIPOLAR FORCEPS: Brand: ENDOWRIST

## (undated) DEVICE — BAG DRNGE 2000ML URIN INF CTRL ANTIREFLX

## (undated) DEVICE — LUBRICANT ELECTRD 4ML ANTISTICK SOL W/ FOAM

## (undated) DEVICE — SUT VCRL 0 L18IN ABSRB VLT POLYGLACTIN 910

## (undated) DEVICE — SNARE OPTMZ PLPCTM TRP

## (undated) DEVICE — TROCAR: Brand: KII FIOS FIRST ENTRY

## (undated) DEVICE — GLOVE SUR 7.5 SENSICARE PI PIP CRM PWD F

## (undated) DEVICE — BLADELESS OBTURATOR: Brand: WECK VISTA

## (undated) DEVICE — UNIVERSAL STAPLER: Brand: ENDO GIA ULTRA

## (undated) DEVICE — CLIP INT L POLYMER LOK LIG HEM O LOK

## (undated) DEVICE — MEDI-VAC NON-CONDUCTIVE SUCTION TUBING 6MM X 1.8M (6FT.) L: Brand: CARDINAL HEALTH

## (undated) DEVICE — SOLUTION IRRIG 1000ML ST H2O AQUALITE PLAS

## (undated) DEVICE — TOWEL SURG OR 17X30IN BLUE

## (undated) DEVICE — TIP COVER ACCESSORY

## (undated) DEVICE — SYRINGE,TOOMEY,IRRIGATION,70CC,STERILE: Brand: MEDLINE

## (undated) DEVICE — ROBOTIC: Brand: MEDLINE INDUSTRIES, INC.

## (undated) DEVICE — SUT COAT VCRL+ 0 27IN UR-6 ABSRB VLT ANTIBACT

## (undated) DEVICE — DUAL LUMEN URETERAL CATHETER

## (undated) DEVICE — 3M™ STERI-DRAPE™ PATIENT ISOLATION DRAPE 1014: Brand: STERI-DRAPE™

## (undated) DEVICE — 35 ML SYRINGE REGULAR TIP: Brand: MONOJECT

## (undated) DEVICE — VIOLET BRAIDED (POLYGLACTIN 910), SYNTHETIC ABSORBABLE SUTURE: Brand: COATED VICRYL

## (undated) DEVICE — CATHETER URETH 18FR BLLN 5CC SIL ALLY W/ SIL

## (undated) DEVICE — SOLUTION  .9 3000ML

## (undated) DEVICE — MOSES 200 FIBER

## (undated) DEVICE — GLOVE SUR 8 SENSICARE PI PIP GRN PWD F

## (undated) DEVICE — INTELLIGENT RELOAD: Brand: TRI-STAPLE 2.0

## (undated) DEVICE — 3 ML SYRINGE LUER-LOCK TIP: Brand: MONOJECT

## (undated) DEVICE — SOLUTION IV 1000ML 0.9% NACL PRESERVATIVE

## (undated) DEVICE — VISUALIZATION SYSTEM: Brand: CLEARIFY

## (undated) DEVICE — SKIN PREP TRAY 4 COMPARTM TRAY: Brand: MEDLINE INDUSTRIES, INC.

## (undated) DEVICE — SUT COAT VCRL + 0 54IN ABSRB UD ANTIBACT

## (undated) DEVICE — SUT COAT VCRL 0 27IN CT-1 ABSRB VLT 36MM 1/2

## (undated) DEVICE — SUT PROL 4-0 36IN RB-1 NABSRB BLU 17MM 1/2 CI

## (undated) DEVICE — AEGIS 1" DISK 4MM HOLE, PEEL OPEN: Brand: MEDLINE

## (undated) DEVICE — LINE MNTR ADLT SET O2 INTMD

## (undated) DEVICE — VIAL ISOVUE 300 10X100ML

## (undated) DEVICE — Device: Brand: DEFENDO AIR/WATER/SUCTION AND BIOPSY VALVE

## (undated) DEVICE — 6 ML SYRINGE LUER-LOCK TIP: Brand: MONOJECT

## (undated) DEVICE — CATH FOLEY 22FR 5CC 2-W INF CTRL STD

## (undated) DEVICE — ARM DRAPE

## (undated) DEVICE — 2HRM17 2-0 UMND 16X16 13MM LDR: Brand: 2HRM17 2-0 UMND 16X16 13MM LDR

## (undated) DEVICE — ABSORBABLE HEMOSTAT (OXIDIZED REGENERATED CELLULOSE): Brand: SURGICEL

## (undated) DEVICE — MONOPOLAR CURVED SCISSORS: Brand: ENDOWRIST

## (undated) DEVICE — SOLO FLEX HYBRID GUIDEWIRE .03

## (undated) DEVICE — DRAPE,ABDOMINAL,MAJOR,STERILE: Brand: MEDLINE

## (undated) DEVICE — TISSUE RETRIEVAL SYSTEM: Brand: INZII RETRIEVAL SYSTEM

## (undated) DEVICE — ADHESIVE SKIN TOP FOR WND CLSR DERMBND ADV

## (undated) DEVICE — MARYLAND BIPOLAR FORCEPS: Brand: ENDOWRIST

## (undated) DEVICE — WATER STERILE AQUALITE 1000ML

## (undated) DEVICE — ENDOSCOPIC VALVE WITH ADAPTER.: Brand: SURSEAL® II

## (undated) DEVICE — SUCTION CANISTER, 3000CC,SAFELINER: Brand: DEROYAL

## (undated) NOTE — LETTER
7/17/2020              Jessica Evans 49         Dear Marlee Hernandez,    This letter is to inform you that our office has made several attempts to reach you by phone without success.   We were attempting

## (undated) NOTE — LETTER
08/31/20        Amanda Puri 4      Dear Holger Cardoza,    [facility] records indicate that you have outstanding lab work and or testing that was ordered for you and has not yet been completed:  Orders Placed This [place]

## (undated) NOTE — LETTER
2/18/2020              Sosa Earlyu        Jovon Evans 49         Dear Shawn Freitas,    This letter is to inform you that our office has made several attempts to reach you by phone without success.   We were attempting

## (undated) NOTE — LETTER
To Whom It May Concern:    Mario Loera has been under our care regarding ongoing medical issues. Because of this, he has been required to restrict his physical activities.    He may resume his usual activities, including work, on January 6, 2025 with the following restrictions:    []  None     []    No heavy lifting (over 15 pounds) for               weeks   []    Part-time (no more than             hours per week) for               week   [x]  Other:   No heavy lifting (over 20-25 pounds) until 02/03/2025, after 02/03/2025 No restrictions.     Please feel free to contact us if there are any questions.      Sincerely,      Jose Angel Turpin MD  [address] [address] [phone]

## (undated) NOTE — MR AVS SNAPSHOT
[facility] [place] [address]  [address]  [phone]               Thank you for choosing us for your health care visit with Karan Myers MD.  We are glad to serve you and happy to provide you with this summary o Schedule stress echocardiogram  Work on better diet and exercise       Allergies as of Feb 23, 2017     Ciprofloxacin                 Today's Vital Signs     BP Pulse Weight             118/72 mmHg 96 222 lb (100.699 kg)            Current Medications

## (undated) NOTE — LETTER
To Whom It May Concern:    Milady Mathews has been under our care regarding ongoing medical issues. Because of this, he has been required to restrict his physical activities. He may resume his usual activities, including work, on Wednesday, 12/21/22  with the following restrictions:    [x]  None     []    No heavy lifting (over 15 pounds) for               weeks   []    Part-time (no more than             hours per week) for               week   []  Other:        Please feel free to contact us if there are any questions.       Sincerely,      Kirk Ashford MD  [address] [place] [address]  [phone]        Document generated by:  Lisa Wang

## (undated) NOTE — MR AVS SNAPSHOT
[address], [address]  [address] [facility]  [phone]               Thank you for choosing us for your health care visit with Ian Hernandez DO.   We are glad to serve you and happy to provide you with this summary CO Q 10 OR   Take 1 tablet by mouth daily.            finasteride 5 MG Tabs   TAKE ONE TABLET BY MOUTH EVERY DAY   Commonly known as:  PROSCAR           FREESTYLE LANCETS Misc   Check sugars daily           Glucose Blood Strp   Check sugars daily   Commonl dairy products with reduced content of saturated and total fat.    Dietary sodium reduction Reduce dietary sodium intake to <= 100 mmol per day (2.4 g sodium or 6 g sodium chloride)   Aerobic physical activity Regular aerobic physical activity (e.g., brisk

## (undated) NOTE — LETTER
To Whom It May Concern:    Mario Loera has been under our care regarding ongoing medical issues. Because of this, he has been required to restrict his physical activities.    He may resume his usual activities, including work, on Monday 1/13/25 with the following restrictions:    []  None     [x]    No heavy lifting (over 10 pounds) for      1         week, then no restrictions afterward.   []    Part-time (no more than             hours per week) for               week   []  Other:        Please feel free to contact us if there are any questions.      Sincerely,      Jose Angel Turpin MD  [address]  [facility] system [address] [phone]

## (undated) NOTE — LETTER
6/7/2023          To Whom It May Concern:    Jurgen Palomino is currently under my medical care and may return to work at this time. He may return to school on 6/8/2023. Activity is restricted as follows: none. If you require additional information please contact our office.         Sincerely,    VIKTORIYA Garcia, FNP-BC              Document generated by:  VIKTORIYA Garcia

## (undated) NOTE — MR AVS SNAPSHOT
After Visit Summary   11/2/2022    Viv Gutierrez   MRN: [id number]           Visit Information     Date & Time  11/2/2022  3:30 PM Provider  Evan Villagomez MD [address] [place] [address], [address] [place] [address],[address], [facility]/[employer]. Phone  [phone]      Your Vitals Were  Most recent update: 11/2/2022  3:16 PM    BP   149/79 (BP Location: Left arm, Patient Position: Sitting, Cuff Size: large)    Pulse   80            Allergies as of 11/2/2022  Review status set to Review Complete on 11/2/2022       Noted Reaction Type Reactions    Ciprofloxacin 03/26/2015          Your Current Medications        Dosage    Glucose Blood (FREESTYLE LITE TEST) In Vitro Strip Check bs daily and prn    metFORMIN HCl 1000 MG Oral Tab Take 1 tablet (1,000 mg total) by mouth 2 (two) times daily with meals. rosuvastatin 40 MG Oral Tab Take 1 tablet (40 mg total) by mouth nightly. finasteride 5 MG Oral Tab Take 1 tablet (5 mg total) by mouth daily. amLODIPine 5 MG Oral Tab Take 1 tablet (5 mg total) by mouth daily. empagliflozin 10 MG Oral Tab Jardiance 10 mg tablet    FreeStyle Lancets Does not apply Misc USE 1  TO CHECK GLUCOSE ONCE DAILY    Blood Glucose Monitoring Suppl (FREESTYLE FREEDOM LITE) w/Device Does not apply Kit Check blood sugar once daily    Blood Glucose Calibration (FREESTYLE CONTROL SOLUTION) In Vitro Liquid Check blood sugar once daily    Ascorbic Acid (VITAMIN C OR) Take by mouth. Multiple Vitamins-Minerals (MULTIVITAMIN MEN OR) Take by mouth. aspirin 81 MG Oral Tab Take 1 tablet by mouth daily. Coenzyme Q10 (CO Q 10 OR) Take 1 tablet by mouth daily. lisinopril 40 MG Oral Tab Take 1 tablet (40 mg total) by mouth daily. Diagnoses for This Visit    Gross hematuria   [599.71. ICD-9-CM]  -  Primary           We Ordered the Following     Normal Orders This Visit    CYTOLOGY FLUIDS [VWU7017 CUSTOM]     Future Labs/Procedures Expected by Expires    CT UROGRAM(W+WO) W/3D(CPT=74178/03649) [COMBO CPT(R)]  11/2/2022 (Approximate) 11/2/2023    CYTOLOGY FLUIDS [BRF1696 CUSTOM]  11/2/2022 11/2/2023      Future Appointments        Provider Department    11/28/2022 4:00 PM Parish Earl  for [facility], [address], [address]      Imaging Scheduling Instructions     Around November 2, 2022   Imaging:   CT UROGRAM(W+WO) W/3D(CPT=74178/99381)    Instructions: Your order will generate a \"Scheduling Ticket\" that will be available in [employer] to schedule on your own at a time most convenient to you. To ensure you receive your test in a timely manner, STAT orders will not generate a ticket and must be scheduled by calling the Central Scheduling department. If you do not have a [employer] Account, or if you prefer to speak with someone to schedule your appointment, please call Rubina Rizvi Scheduling at [phone]. November 3, 2022      [address] [address]     Dear Alejandra Michel : Thank you for enrolling in [address] Please follow the instructions below to securely access your online medical record. [employer] allows you to send messages to your doctor, view test results, renew prescriptions and request appointments. How Do I Sign Up? 1. In your Internet browser, go to [email or web address]  2. Click on the Activate your Account if you have an activation code in the box under the *New User? section. 3. Enter your [employer] Activation Code exactly as it appears below. You will not need to use this code after you have completed the sign-up process. If you do not sign up before the expiration date, you must request a new code. [employer] Activation Code: [email or web address]  Expires: 11/13/2022  3:03 PM    4. Enter your Zip Code and Date of Birth (mm/dd/yyyy) as indicated and click Next. You will be taken to the next sign-up page. 5. Create a [employer] Username.  This will be your [employer] login Username and cannot be changed, so think of one that is secure and easy to remember. 6. Create a [employer] password. You can change your password at any time. 7. Choose a Security Question and enter your Answer and click Next. This can be used at a later time if you forget your password. 8. Enter your e-mail address. You will receive e-mail notification when new information is available in [facility] 9. Click Sign In. You can now view your medical record. Additional Information  If you have questions, you can call [phone] to talk to our [address] staff. Remember, [employer] is NOT to be used for urgent needs. For medical emergencies, dial 911. Sincerely,    Augustus Morales MD              Did you know that [facility] primary care physicians now offer Video Visits through [address] for adult patients for a variety of conditions such as allergies, back pain and cold symptoms? Skip the drive and waiting room and online chat with a doctor face-to-face using your web-cam enabled computer or mobile device wherever you are. Video Visits cost $50 and can be paid hassle-free using a credit, debit, or health savings card. Not active on [employer]? Ask us how to get signed up today! If you receive a survey from [employer], please take a few minutes to complete it and provide feedback. We strive to deliver the best patient experience and are looking for ways to make improvements. Your feedback will help us do so. For more information on [employer], please visit [email or web address]           No text in SmartText           No text in SmartText

## (undated) NOTE — LETTER
To Whom It May Concern:    Mario Loera has been under our care regarding ongoing medical issues. Because of this, he has been required to restrict his physical activities.    He may resume his usual activities, including work, on Tues. 1/14/25 with the following restrictions:    []  None     [x]    No heavy lifting (over 10 pounds) for         1      weeks, then no restrictions afterward.    []    Part-time (no more than             hours per week) for               week   []  Other:        Please feel free to contact us if there are any questions.      Sincerely,        Jose Angel Turpin MD  [address]  [facility] [address] [phone]

## (undated) NOTE — LETTER
12/20/2024    Mario Loera        [address]        [facility] [address]            Dear Mario Loera,      Our records indicate that you are due for an appointment for a Colonoscopy with Simon Alvarez MD. Our doctors are booking out about 3-6 months in advance for procedures.     Please call our office to schedule this appointment.  Your medical well-being is important to us.    If your insurance requires a referral, please call your primary care office to request one.      Thank you,      The Physicians and Staff at [facility]

## (undated) NOTE — LETTER
[facility] ULTRASOUND  [address] [facility] [address]  Authorization for Imaging Procedure  Date of Procedure:     I hereby authorize Dr. Earl, my physician and his/her assistants (if applicable), which may include medical students, residents, and/or fellows, to perform the following procedure and administer such anesthesia as may be determined necessary by my physician: ULTRASOUND GUIDED PROSTATE BIOPSY  on Mario Loera.   2.  I recognize that during the procedure, unforeseen conditions may necessitate additional or different procedures than those listed above. I, therefore, further authorize and request that the above-named physician, assistants, or designees perform such procedures as are, in their judgment, necessary and desirable.    3.  My physician has discussed prior to my procedure the potential benefits, risks and side effects of this procedure; the likelihood of achieving goals; and potential problems that might occur during recuperation. They also discussed reasonable alternatives to the procedure, including risks, benefits, and side effects related to the alternatives and risks related to not receiving this procedure. I have had all my questions answered and I acknowledge that no guarantee has been made as to the result that may be obtained.    4.  Should the need arise during my procedure, which includes change of level of care prior to discharge, I also consent to the administration of blood and/or blood products. Further, I understand that despite careful testing and screening of blood or blood products by collecting agencies, I may still be subject to ill effects as a result of receiving a blood transfusion and/or blood products. The following are some, but not all, of the potential risks that can occur: fever and allergic reactions, hemolytic reactions, transmission of diseases such as Hepatitis, AIDS and Cytomegalovirus (CMV) and fluid overload. In the event that  I wish to have an autologous transfusion of my own blood, or a directed donor transfusion, I will discuss this with my physician.  Check only if Refusing Blood or Blood Products  I understand refusal of blood or blood products as deemed necessary by my physician may have serious consequences to my condition to include possible death. I hereby assume responsibility for my refusal and release the hospital, its personnel, and my physicians from any responsibility for the consequences of my refusal.   [  ] Patient Refuses Blood      5.  I authorize the use of any specimen, organs, tissues, body parts or foreign objects that may be removed from my body during the procedure for diagnosis, research or teaching purposes and their subsequent disposal by hospital authorities. I also authorize the release of specimen test results and/or written reports to my treating physician on the hospital medical staff or other referring or consulting physicians involved in my care, at the discretion of the Pathologist or my treating physician.    6.  I consent to the photographing or videotaping of the procedures to be performed, including appropriate portions of my body for medical, scientific, or educational purposes, provided my identity is not revealed by the pictures or by descriptive texts accompanying them. If the procedure has been photographed/videotaped, the physician will obtain the original picture, image, videotape or CD. The hospital will not be responsible for storage, release or maintenance of the picture, image, tape or CD.   7.  I consent to the presence of a  or observers in the operating room as deemed necessary by my physician or their designees.    8.  I recognize that in the event my procedure results in extended X-Ray/fluoroscopy time, I may develop a skin reaction.    9.  If I have a Do Not Attempt Resuscitation (DNAR) order in place, that status will be suspended while in the operating room,  procedural suite, and during the recovery period unless otherwise explicitly stated by me (or a person authorized to consent on my behalf). The performing physician or my attending physician will determine when the applicable recovery period ends for purposes of reinstating the DNAR order.  10.  I acknowledge that my physician has explained sedation/analgesia administration to me including the risk and benefits I consent to the administration of sedation/analgesia as may be necessary or desirable in the judgment of my physician.      I CERTIFY THAT I HAVE READ AND FULLY UNDERSTAND THE ABOVE CONSENT FOR THE PROCEDURE.   Signature of Patient: _____________________________________________________________  Responsible person in case of minor, unconscious: ____________________________________  Relationship to patient:  __________________________________________________________  Signature of Witness: _______________________________Date: _________Time: __________    Statement of Physician: My signature below affirms that prior to the time of the procedure, I have explained to the patient and/or his guardian, the risks and benefits involved in the proposed treatment and any reasonable alternative to the proposed treatment. I have also explained the risks and benefits involved in the refusal of the proposed treatment and have answered the patient's questions. If I have a significant financial interest in a co-management agreement or a significant financial interest in any product or implant, or other significant relationship used in the procedure/surgery, I have disclosed this and had a discussion with my patient.  Signature of Physician:   _________________________________Date:_____________Time:________    Patient Name: Mario Loera : 1960  Printed: 2024   Medical Record #: [id number]

## (undated) NOTE — Clinical Note
Guerline Kerns Do  [address], [address]       02/23/2017        Patient: Victoriano Jones   YOB: 1960   Date of Visit: 2/23/2017       Dear  Dr. Tessa Bunch,      Thank you for referring Victoriano Jones to my prac

## (undated) NOTE — Clinical Note
64-year-old man intermediate risk prostate cancer (very low volume 3+4) who is interested in surgery.  He has had an umbilical hernia repair in the past unclear if there is mesh.  He has recurrent umbilical hernia now.  I offered him decipher versus radiation versus surgery.  He is interested in surgery  Can you see him in the clinic to discuss?